# Patient Record
Sex: MALE | Race: WHITE | Employment: OTHER | URBAN - METROPOLITAN AREA
[De-identification: names, ages, dates, MRNs, and addresses within clinical notes are randomized per-mention and may not be internally consistent; named-entity substitution may affect disease eponyms.]

---

## 2017-01-09 ENCOUNTER — OFFICE VISIT (OUTPATIENT)
Dept: FAMILY MEDICINE CLINIC | Age: 62
End: 2017-01-09

## 2017-01-09 VITALS
DIASTOLIC BLOOD PRESSURE: 72 MMHG | HEIGHT: 75 IN | HEART RATE: 83 BPM | OXYGEN SATURATION: 99 % | SYSTOLIC BLOOD PRESSURE: 124 MMHG | RESPIRATION RATE: 12 BRPM | BODY MASS INDEX: 28.1 KG/M2 | WEIGHT: 226 LBS | TEMPERATURE: 97.2 F

## 2017-01-09 DIAGNOSIS — Z72.0 TOBACCO ABUSE: ICD-10-CM

## 2017-01-09 DIAGNOSIS — I10 HTN, GOAL BELOW 140/90: ICD-10-CM

## 2017-01-09 LAB — HBA1C MFR BLD HPLC: 12.3 %

## 2017-01-09 RX ORDER — LISINOPRIL AND HYDROCHLOROTHIAZIDE 12.5; 2 MG/1; MG/1
TABLET ORAL
Qty: 90 TAB | Refills: 0 | Status: SHIPPED | OUTPATIENT
Start: 2017-01-09 | End: 2017-04-21 | Stop reason: SDUPTHER

## 2017-01-09 RX ORDER — METFORMIN HYDROCHLORIDE 1000 MG/1
TABLET ORAL
Qty: 180 TAB | Refills: 0 | Status: SHIPPED | OUTPATIENT
Start: 2017-01-09 | End: 2017-04-21 | Stop reason: SDUPTHER

## 2017-01-09 RX ORDER — GLIPIZIDE 10 MG/1
10 TABLET ORAL 2 TIMES DAILY
Qty: 180 TAB | Refills: 0 | Status: SHIPPED | OUTPATIENT
Start: 2017-01-09 | End: 2017-04-21 | Stop reason: SDUPTHER

## 2017-01-09 RX ORDER — OXYCODONE AND ACETAMINOPHEN 5; 325 MG/1; MG/1
2 TABLET ORAL
COMMUNITY
Start: 2016-12-09

## 2017-01-09 RX ORDER — INSULIN GLARGINE 100 [IU]/ML
INJECTION, SOLUTION SUBCUTANEOUS
Qty: 1 EACH | Refills: 3 | Status: SHIPPED | OUTPATIENT
Start: 2017-01-09 | End: 2017-01-30 | Stop reason: SDUPTHER

## 2017-01-09 NOTE — MR AVS SNAPSHOT
Visit Information Date & Time Provider Department Dept. Phone Encounter #  
 1/9/2017  2:40 PM [facility] [phone] [id number] Follow-up Instructions Return in about 2 weeks (around 1/23/2017) for Diabetes follow up. Upcoming Health Maintenance Date Due  
 EYE EXAM RETINAL OR DILATED Q1 4/25/2017* HEMOGLOBIN A1C Q6M 3/15/2017 FOOT EXAM Q1 9/15/2017 MICROALBUMIN Q1 9/15/2017 LIPID PANEL Q1 9/15/2017 DTaP/Tdap/Td series (2 - Td) 9/15/2022 COLONOSCOPY 4/25/2026 *Topic was postponed. The date shown is not the original due date. Allergies as of 1/9/2017  Review Complete On: 1/9/2017 By: Brian Guidry LPN No Known Allergies Current Immunizations  Never Reviewed No immunizations on file. Not reviewed this visit You Were Diagnosed With   
  
 Codes Comments Uncontrolled type 2 diabetes mellitus with microalbuminuria, with long-term current use of insulin (HCC)    -  Primary ICD-10-CM: E11.29, E11.65, R80.9, Z79.4 ICD-9-CM: 250.42, 791.0, V58.67 Type II diabetes mellitus with nephropathy (HCC)     ICD-10-CM: E11.21 
ICD-9-CM: 250.40, 583.81   
 HTN, goal below 140/90     ICD-10-CM: I10 
ICD-9-CM: 401.9 Vitals BP Pulse Temp Resp Height(growth percentile) Weight(growth percentile) 124/72 83 97.2 °F (36.2 °C) (Oral) 12 6' 3\" (1.905 m) 226 lb (102.5 kg) SpO2 BMI Smoking Status 99% 28.25 kg/m2 Current Every Day Smoker Vitals History BMI and BSA Data Body Mass Index Body Surface Area  
 28.25 kg/m 2 2.33 m 2 Preferred Pharmacy Pharmacy Name Phone [phone] [place] [phone] Your Updated Medication List  
  
   
This list is accurate as of: 1/9/17  3:43 PM.  Always use your most recent med list.  
  
  
  
  
 atorvastatin 80 mg tablet Commonly known as:  LIPITOR Take 1 Tab by mouth daily. Blood-Glucose Meter Misc Commonly known as:  ADVANCED GLUCOSE METER  
1 Units by Does Not Apply route daily. Check fasting blood sugars once daily and record results. budesonide-formoterol 160-4.5 mcg/actuation HFA inhaler Commonly known as:  SYMBICORT Take 2 Puffs by inhalation two (2) times a day. cyclobenzaprine 10 mg tablet Commonly known as:  FLEXERIL Take 1 Tab by mouth three (3) times daily as needed for Muscle Spasm(s). gabapentin 600 mg tablet Commonly known as:  NEURONTIN Take  by mouth two (2) times a day. glipiZIDE 10 mg tablet Commonly known as:  [place] Take 1 Tab by mouth two (2) times a day. glucose blood VI test strips strip Commonly known as:  ADVANCED GLUC METER TEST STRIP Check blood glucose fasting once daily  
  
 ibuprofen 600 mg tablet Commonly known as:  MOTRIN  
TAKE ONE TABLET BY MOUTH EVERY 8 HOURS AS NEEDED FOR PAIN  
  
 insulin glargine 100 unit/mL (3 mL) pen Commonly known as:  LANTUS SOLOSTAR Inject 22 units once per day at bedtime  Indications: type 2 diabetes mellitus Insulin Needles (Disposable) 31 gauge x 1/4\" Ndle Commonly known as:  COMFORT EZ PEN NEEDLES  
1 Units by Does Not Apply route daily. Lancets Misc Check blood glucose daily  
  
 lisinopril-hydroCHLOROthiazide 20-12.5 mg per tablet Commonly known as:  PRINZIDE, ZESTORETIC  
TAKE ONE TABLET BY MOUTH ONCE DAILY  
  
 metFORMIN 1,000 mg tablet Commonly known as:  GLUCOPHAGE  
TAKE ONE TABLET BY MOUTH TWICE DAILY WITH MEALS  
  
 oxyCODONE-acetaminophen 5-325 mg per tablet Commonly known as:  PERCOCET SITagliptin 50 mg tablet Commonly known as:  Gisella Rhein Take 1 Tab by mouth daily. tiotropium bromide 1.25 mcg/actuation inhaler Commonly known as:  Juanis Butt Take 2 Puffs by inhalation daily. Prescriptions Sent to Pharmacy Refills glipiZIDE (GLUCOTROL) 10 mg tablet 0 Sig: Take 1 Tab by mouth two (2) times a day. Class: Normal  
 Pharmacy: [address] Ph #: [phone] Route: Oral  
 lisinopril-hydroCHLOROthiazide (PRINZIDE, ZESTORETIC) 20-12.5 mg per tablet 0 Sig: TAKE ONE TABLET BY MOUTH ONCE DAILY Class: Normal  
 Pharmacy: [address] [place] [address] Ph #: [phone]  
 metFORMIN (GLUCOPHAGE) 1,000 mg tablet 0 Sig: TAKE ONE TABLET BY MOUTH TWICE DAILY WITH MEALS Class: Normal  
 Pharmacy: [address] [place] [address] Ph #: [phone]  
 insulin glargine (LANTUS SOLOSTAR) 100 unit/mL (3 mL) pen 3 Sig: Inject 22 units once per day at bedtime  Indications: type 2 diabetes mellitus Class: Normal  
 Pharmacy: [address] Ph #: [phone] We Performed the Following AMB POC HEMOGLOBIN A1C [69198 CPT(R)] Follow-up Instructions Return in about 2 weeks (around 1/23/2017) for Diabetes follow up. Patient Instructions   
 
  
- Start taking Lantus 22 units at bedtime.  
- Increase to 25 units if fasting blood sugars > 150 in 1 week. - Follow up in 2-3 weeks Statins: Care Instructions Your Care Instructions Statins are medicines that lower your cholesterol and your risk for a heart attack and stroke. Cholesterol is a type of fat in your blood. If you have too much cholesterol, it can build up in blood vessels. This raises your risk of heart disease, heart attack, and stroke. Statins lower cholesterol by blocking how much cholesterol your body makes. This prevents cholesterol from building up in your blood vessels. This is called hardening of the arteries. It is the starting point for some heart and blood flow problems, such as heart disease.  Statins may also reduce inflammation around the buildup (called plaque). This can lower the risk that the plaque will break apart and lead to a heart attack or stroke. A heart-healthy lifestyle is important for lowering your risk whether you take statins or not. This includes eating healthy foods, being active, staying at a healthy weight, and not smoking. You must take statins regularly for them to work well. If you stop, your cholesterol and your risk will go back up. Examples of statins include: · Atorvastatin (Lipitor). · Lovastatin (Mevacor). · Pravastatin (Pravachol). · Simvastatin (Zocor). Statins interact with many medicines. So tell your doctor all of the other medicines that you take. These include prescription medicines, over-the-counter medicines, dietary supplements, and herbal products. Follow-up care is a key part of your treatment and safety. Be sure to make and go to all appointments, and call your doctor if you are having problems. It's also a good idea to know your test results and keep a list of the medicines you take. How can you care for yourself at home? · Take statins exactly as your doctor tells you. High cholesterol has no symptoms. So it is easy to forget to take the pills. Try to make a system that reminds you to take them. · Do not take two or more medicines at the same time unless the doctor told you to. Statins can interact with other medicines. · Always tell your doctor if you think you are having a side effect. If side effects are a problem with one medicine, a different one may be used. · Keep making the lifestyle changes your doctor suggests. Eat heart-healthy foods, be active, don't smoke, and stay at a healthy weight. · Talk to your doctor about avoiding grapefruit juice if you take statins. Grapefruit juice can raise the level of this medicine in your blood. This could increase side effects. When should you call for help? Watch closely for changes in your health, and be sure to contact your doctor if: 
· You have side effects of statins. These include: ¨ Fatigue. ¨ Upset stomach. ¨ Gas. ¨ Constipation. ¨ Pain or cramps in the belly. ¨ Muscle aches. · You have any new symptoms or side effects. Where can you learn more? Go to http://jaz-bhavna.info/. Enter R358 in the search box to learn more about \"Statins: Care Instructions. \" Current as of: June 30, 2016 Content Version: 11.1 © 8163-8496 [employer]. Care instructions adapted under license by [employer] (which disclaims liability or warranty for this information). If you have questions about a medical condition or this instruction, always ask your healthcare professional. [address] any warranty or liability for your use of this information. Insulin Glargine (By injection) Insulin Glargine, Recombinant (IN-taylor-lisa GLAKAIA-carolee, ree-KOM-bi-nant) Treats diabetes. Brand Name(s):Lantus, Lantus SoloStar, Toujeo There may be other brand names for this medicine. When This Medicine Should Not Be Used: This medicine is not right for everyone. Do not use it if you had an allergic reaction to insulin glargine or other components of this product. How to Use This Medicine:  
Injectable · Your doctor will prescribe your exact dose and tell you how often it should be given. This medicine is given as a shot under your skin. If you use insulin once a day, it is best to use it at about the same time every day. · You will be taught how to give your medicine at home. Make sure you understand all instructions. · This medicine should look clear before you use it. Do not shake the vial. Do not mix this medicine with any other insulin or with water. · You will be shown the body areas where this shot can be given. Use a different body area each time you give yourself a shot.  Keep track of where you give each shot to make sure you rotate body areas. · Use a new needle and syringe each time you inject your medicine. · Read and follow the patient instructions that come with this medicine. Talk to your doctor or pharmacist if you have any questions. · Use only syringes that are made for insulin. Some types of insulin must be used with a certain type of syringe or needle. Ask your pharmacist if you are not sure which one to use. · When you get a new supply of insulin, check the label to be sure it is the correct type. · Missed dose: Call your doctor or pharmacist for instructions. · Unopened medicine: Store the vials, cartridges, and SoloStar® pens in the refrigerator. Protect from light. Do not freeze. · Opened medicine that is currently being used: ¨ Vials: Store in the refrigerator or at room temperature in a cool place, away from sunlight and heat. Use within 28 days. ¨ Cartridge or Pulte Homes: Store at room temperature, away from direct heat and light. Do not refrigerate. Throw away any opened cartridge or Lantus® SoloStar® pen after 28 days. Throw away any opened [employer] after 42 days. · Throw away used needles in a hard, closed container that the needles cannot poke through. Keep this container away from children and pets. Drugs and Foods to Avoid: Ask your doctor or pharmacist before using any other medicine, including over-the-counter medicines, vitamins, and herbal products. · Some medicines can change the amount of insulin you need and make it harder for you to control your diabetes. Make sure your doctor knows about all other medicines you are using, especially pramlintide, somatropin, blood pressure medicine, birth control pills, thyroid medicine, or a protease inhibitor (HIV medicine). · Do not drink alcohol while you are using this medicine. Warnings While Using This Medicine: · Tell your doctor if you are pregnant or breastfeeding, or if you have kidney disease, liver disease, heart disease, or heart failure. · This medicine may cause the following problems: 
¨ Low blood sugar levels ¨ Low potassium levels ¨ Fluid retention or heart failure (when used with thiazolidinedione medicine) · Never share insulin pens or cartridges with anyone. Shared needles or pens can pass hepatitis viruses, HIV, or other illnesses from one person to another. · Your doctor will do lab tests at regular visits to check on the effects of this medicine. Keep all appointments. · Keep all medicine out of the reach of children. Never share your medicine with anyone. Possible Side Effects While Using This Medicine:  
Call your doctor right away if you notice any of these side effects: · Allergic reaction: Itching or hives, swelling in your face or hands, swelling or tingling in your mouth or throat, chest tightness, trouble breathing · Dry mouth, increased thirst, muscle cramps, nausea or vomiting, uneven heartbeat · Rapid weight gain, swelling in your hands, ankles, or feet · Shaking, trembling, sweating, lightheadedness, hunger, confusion, fast or pounding heartbeat · Trouble breathing, chest pain, unusual tiredness If you notice these less serious side effects, talk with your doctor: · Redness, pain, itching, burning, swelling, or a lump under your skin where the shot was given If you notice other side effects that you think are caused by this medicine, tell your doctor. Call your doctor for medical advice about side effects. You may report side effects to FDA at 2-488-FDA-5696 © 2016 [address] is for End User's use only and may not be sold, redistributed or otherwise used for commercial purposes. The above information is an  only. It is not intended as medical advice for individual conditions or treatments.  Talk to your doctor, nurse or pharmacist before following any medical regimen to see if it is safe and effective for you. Introducing [facility]! Romayne Duster introduces [employer] patient portal. Now you can access parts of your medical record, email your doctor's office, and request medication refills online. 1. In your internet browser, go to [email or web address] 2. Click on the First Time User? Click Here link in the Sign In box. You will see the New Member Sign Up page. 3. Enter your [employer] Access Code exactly as it appears below. You will not need to use this code after youve completed the sign-up process. If you do not sign up before the expiration date, you must request a new code. · [employer] Access Code: [email or web address] Expires: 4/9/2017  3:43 PM 
 
4. Enter the last four digits of your Social Security Number (xxxx) and Date of Birth (mm/dd/yyyy) as indicated and click Submit. You will be taken to the next sign-up page. 5. Create a [employer] ID. This will be your [employer] login ID and cannot be changed, so think of one that is secure and easy to remember. 6. Create a [employer] password. You can change your password at any time. 7. Enter your Password Reset Question and Answer. This can be used at a later time if you forget your password. 8. Enter your e-mail address. You will receive e-mail notification when new information is available in [address] 9. Click Sign Up. You can now view and download portions of your medical record. 10. Click the Download Summary menu link to download a portable copy of your medical information. If you have questions, please visit the Frequently Asked Questions section of the [employer] website. Remember, [employer] is NOT to be used for urgent needs. For medical emergencies, dial 911. Now available from your iPhone and Android! Please provide this summary of care documentation to your next provider. Your primary care clinician is listed as April Inoue.  If you have any questions after today's visit, please call [phone].

## 2017-01-09 NOTE — PATIENT INSTRUCTIONS
- Start taking Lantus 22 units at bedtime.   - Increase to 25 units if fasting blood sugars > 150 in 1 week. - Follow up in 2-3 weeks    Statins: Care Instructions  Your Care Instructions  Statins are medicines that lower your cholesterol and your risk for a heart attack and stroke. Cholesterol is a type of fat in your blood. If you have too much cholesterol, it can build up in blood vessels. This raises your risk of heart disease, heart attack, and stroke. Statins lower cholesterol by blocking how much cholesterol your body makes. This prevents cholesterol from building up in your blood vessels. This is called hardening of the arteries. It is the starting point for some heart and blood flow problems, such as heart disease. Statins may also reduce inflammation around the buildup (called plaque). This can lower the risk that the plaque will break apart and lead to a heart attack or stroke. A heart-healthy lifestyle is important for lowering your risk whether you take statins or not. This includes eating healthy foods, being active, staying at a healthy weight, and not smoking. You must take statins regularly for them to work well. If you stop, your cholesterol and your risk will go back up. Examples of statins include:  · Atorvastatin (Lipitor). · Lovastatin (Mevacor). · Pravastatin (Pravachol). · Simvastatin (Zocor). Statins interact with many medicines. So tell your doctor all of the other medicines that you take. These include prescription medicines, over-the-counter medicines, dietary supplements, and herbal products. Follow-up care is a key part of your treatment and safety. Be sure to make and go to all appointments, and call your doctor if you are having problems. It's also a good idea to know your test results and keep a list of the medicines you take. How can you care for yourself at home? · Take statins exactly as your doctor tells you. High cholesterol has no symptoms.  So it is easy to forget to take the pills. Try to make a system that reminds you to take them. · Do not take two or more medicines at the same time unless the doctor told you to. Statins can interact with other medicines. · Always tell your doctor if you think you are having a side effect. If side effects are a problem with one medicine, a different one may be used. · Keep making the lifestyle changes your doctor suggests. Eat heart-healthy foods, be active, don't smoke, and stay at a healthy weight. · Talk to your doctor about avoiding grapefruit juice if you take statins. Grapefruit juice can raise the level of this medicine in your blood. This could increase side effects. When should you call for help? Watch closely for changes in your health, and be sure to contact your doctor if:  · You have side effects of statins. These include:  ¨ Fatigue. ¨ Upset stomach. ¨ Gas. ¨ Constipation. ¨ Pain or cramps in the belly. ¨ Muscle aches. · You have any new symptoms or side effects. Where can you learn more? Go to http://jaz-bhavna.info/. Enter R358 in the search box to learn more about \"Statins: Care Instructions. \"  Current as of: June 30, 2016  Content Version: 11.1  © 9832-8380 [employer]. Care instructions adapted under license by [employer] (which disclaims liability or warranty for this information). If you have questions about a medical condition or this instruction, always ask your healthcare professional. [place] [address] any warranty or liability for your use of this information. Insulin Glargine (By injection)   Insulin Glargine, Recombinant (IN-taylor-lisa GLAR-jeen, ree-KOM-bi-nant)  Treats diabetes. Brand Name(s):Lantus, Lantus SoloStar, Toujeo   There may be other brand names for this medicine. When This Medicine Should Not Be Used: This medicine is not right for everyone.  Do not use it if you had an allergic reaction to insulin glargine or other components of this product. How to Use This Medicine:   Injectable  · Your doctor will prescribe your exact dose and tell you how often it should be given. This medicine is given as a shot under your skin. If you use insulin once a day, it is best to use it at about the same time every day. · You will be taught how to give your medicine at home. Make sure you understand all instructions. · This medicine should look clear before you use it. Do not shake the vial. Do not mix this medicine with any other insulin or with water. · You will be shown the body areas where this shot can be given. Use a different body area each time you give yourself a shot. Keep track of where you give each shot to make sure you rotate body areas. · Use a new needle and syringe each time you inject your medicine. · Read and follow the patient instructions that come with this medicine. Talk to your doctor or pharmacist if you have any questions. · Use only syringes that are made for insulin. Some types of insulin must be used with a certain type of syringe or needle. Ask your pharmacist if you are not sure which one to use. · When you get a new supply of insulin, check the label to be sure it is the correct type. · Missed dose: Call your doctor or pharmacist for instructions. · Unopened medicine: Store the vials, cartridges, and SoloStar® pens in the refrigerator. Protect from light. Do not freeze. · Opened medicine that is currently being used:   ¨ Vials: Store in the refrigerator or at room temperature in a cool place, away from sunlight and heat. Use within 28 days. ¨ Cartridge or Pulte Homes: Store at room temperature, away from direct heat and light. Do not refrigerate. Throw away any opened cartridge or Lantus® SoloStar® pen after 28 days. Throw away any opened [employer] after 42 days. · Throw away used needles in a hard, closed container that the needles cannot poke through.  Keep this container away from children and pets. Drugs and Foods to Avoid:   Ask your doctor or pharmacist before using any other medicine, including over-the-counter medicines, vitamins, and herbal products. · Some medicines can change the amount of insulin you need and make it harder for you to control your diabetes. Make sure your doctor knows about all other medicines you are using, especially pramlintide, somatropin, blood pressure medicine, birth control pills, thyroid medicine, or a protease inhibitor (HIV medicine). · Do not drink alcohol while you are using this medicine. Warnings While Using This Medicine:   · Tell your doctor if you are pregnant or breastfeeding, or if you have kidney disease, liver disease, heart disease, or heart failure. · This medicine may cause the following problems:  ¨ Low blood sugar levels  ¨ Low potassium levels  ¨ Fluid retention or heart failure (when used with thiazolidinedione medicine)  · Never share insulin pens or cartridges with anyone. Shared needles or pens can pass hepatitis viruses, HIV, or other illnesses from one person to another. · Your doctor will do lab tests at regular visits to check on the effects of this medicine. Keep all appointments. · Keep all medicine out of the reach of children. Never share your medicine with anyone.   Possible Side Effects While Using This Medicine:   Call your doctor right away if you notice any of these side effects:  · Allergic reaction: Itching or hives, swelling in your face or hands, swelling or tingling in your mouth or throat, chest tightness, trouble breathing  · Dry mouth, increased thirst, muscle cramps, nausea or vomiting, uneven heartbeat  · Rapid weight gain, swelling in your hands, ankles, or feet  · Shaking, trembling, sweating, lightheadedness, hunger, confusion, fast or pounding heartbeat  · Trouble breathing, chest pain, unusual tiredness  If you notice these less serious side effects, talk with your doctor:   · Redness, pain, itching, burning, swelling, or a lump under your skin where the shot was given  If you notice other side effects that you think are caused by this medicine, tell your doctor. Call your doctor for medical advice about side effects. You may report side effects to FDA at 7-006-LMO-2152  © 2016 [address] is for End User's use only and may not be sold, redistributed or otherwise used for commercial purposes. The above information is an  only. It is not intended as medical advice for individual conditions or treatments. Talk to your doctor, nurse or pharmacist before following any medical regimen to see if it is safe and effective for you.
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
## 2017-01-13 ENCOUNTER — TELEPHONE (OUTPATIENT)
Dept: FAMILY MEDICINE CLINIC | Age: 62
End: 2017-01-13

## 2017-01-13 NOTE — TELEPHONE ENCOUNTER
Patient called wanting to speak with Dr. Leopold Marble regarding medication that he was prescribed by the dentist other

## 2017-01-19 NOTE — TELEPHONE ENCOUNTER
Returned call. Patient ID [id number]. His dentist put him on Amoxicillin for a tooth infection and he was concerned about the high dose. So far tolerating the medication well. Reassured him that the Amoxicillin was safe to take with his other medications. He plans to follow up with his Dentist soon. Also, BGs still running high in the 250s. Instructed him to increase his Lantus from 22units daily to 26 units daily. Follow up as planned on 1/30.     Lucila Hancock MD

## 2017-01-30 ENCOUNTER — OFFICE VISIT (OUTPATIENT)
Dept: FAMILY MEDICINE CLINIC | Age: 62
End: 2017-01-30

## 2017-01-30 VITALS
RESPIRATION RATE: 16 BRPM | OXYGEN SATURATION: 98 % | SYSTOLIC BLOOD PRESSURE: 138 MMHG | DIASTOLIC BLOOD PRESSURE: 79 MMHG | BODY MASS INDEX: 27.73 KG/M2 | WEIGHT: 223 LBS | TEMPERATURE: 97.4 F | HEIGHT: 75 IN | HEART RATE: 76 BPM

## 2017-01-30 DIAGNOSIS — Z72.0 TOBACCO ABUSE: ICD-10-CM

## 2017-01-30 RX ORDER — INSULIN GLARGINE 100 [IU]/ML
INJECTION, SOLUTION SUBCUTANEOUS
Qty: 1 EACH | Refills: 3 | Status: SHIPPED | OUTPATIENT
Start: 2017-01-30 | End: 2017-04-27 | Stop reason: SDUPTHER

## 2017-01-30 NOTE — MR AVS SNAPSHOT
Visit Information Date & Time Provider Department Dept. Phone Encounter #  
 1/30/2017  1:50 PM [facility] [phone] [id number] Follow-up Instructions Return in about 4 weeks (around 2/27/2017) for Diabetes follow up. Upcoming Health Maintenance Date Due  
 EYE EXAM RETINAL OR DILATED Q1 1/30/2018* HEMOGLOBIN A1C Q6M 7/9/2017 FOOT EXAM Q1 9/15/2017 MICROALBUMIN Q1 9/15/2017 LIPID PANEL Q1 9/15/2017 DTaP/Tdap/Td series (2 - Td) 9/15/2022 COLONOSCOPY 4/25/2026 *Topic was postponed. The date shown is not the original due date. Allergies as of 1/30/2017  Review Complete On: 1/30/2017 By: Matt Montoya, PHILIP, RT, NM, ARRT No Known Allergies Current Immunizations  Never Reviewed No immunizations on file. Not reviewed this visit You Were Diagnosed With   
  
 Codes Comments Uncontrolled type 2 diabetes mellitus with microalbuminuria, with long-term current use of insulin ([facility])    -  Primary ICD-10-CM: E11.29, E11.65, R80.9, Z79.4 ICD-9-CM: 250.42, 791.0, V58.67 Tobacco abuse     ICD-10-CM: Z72.0 ICD-9-CM: 305.1 Vitals BP Pulse Temp Resp Height(growth percentile) Weight(growth percentile) 138/79 (BP 1 Location: Right arm, BP Patient Position: Sitting) 76 97.4 °F (36.3 °C) (Oral) 16 6' 3\" (1.905 m) 223 lb (101.2 kg) SpO2 BMI Smoking Status 98% 27.87 kg/m2 Current Every Day Smoker BMI and BSA Data Body Mass Index Body Surface Area  
 27.87 kg/m 2 2.31 m 2 Preferred Pharmacy Pharmacy Name Phone [facility] [address] [phone] Your Updated Medication List  
  
   
This list is accurate as of: 1/30/17  2:34 PM.  Always use your most recent med list.  
  
  
  
  
 atorvastatin 80 mg tablet Commonly known as:  LIPITOR Take 1 Tab by mouth daily. Blood-Glucose Meter Misc Commonly known as:  ADVANCED GLUCOSE METER  
1 Units by Does Not Apply route daily. Check fasting blood sugars once daily and record results. budesonide-formoterol 160-4.5 mcg/actuation HFA inhaler Commonly known as:  SYMBICORT Take 2 Puffs by inhalation two (2) times a day. cyclobenzaprine 10 mg tablet Commonly known as:  FLEXERIL Take 1 Tab by mouth three (3) times daily as needed for Muscle Spasm(s). gabapentin 600 mg tablet Commonly known as:  NEURONTIN Take  by mouth two (2) times a day. glipiZIDE 10 mg tablet Commonly known as:  Selestine Levin Take 1 Tab by mouth two (2) times a day. glucose blood VI test strips strip Commonly known as:  ADVANCED GLUC METER TEST STRIP Check blood glucose fasting once daily  
  
 ibuprofen 600 mg tablet Commonly known as:  MOTRIN  
TAKE ONE TABLET BY MOUTH EVERY 8 HOURS AS NEEDED FOR PAIN  
  
 insulin glargine 100 unit/mL (3 mL) pen Commonly known as:  LANTUS SOLOSTAR Inject 30 units once per day at bedtime  Indications: type 2 diabetes mellitus Insulin Needles (Disposable) 31 gauge x 1/4\" Ndle Commonly known as:  COMFORT EZ PEN NEEDLES  
1 Units by Does Not Apply route daily. Lancets Misc Check blood glucose daily  
  
 lisinopril-hydroCHLOROthiazide 20-12.5 mg per tablet Commonly known as:  PRINZIDE, ZESTORETIC  
TAKE ONE TABLET BY MOUTH ONCE DAILY  
  
 metFORMIN 1,000 mg tablet Commonly known as:  GLUCOPHAGE  
TAKE ONE TABLET BY MOUTH TWICE DAILY WITH MEALS  
  
 oxyCODONE-acetaminophen 5-325 mg per tablet Commonly known as:  PERCOCET SITagliptin 50 mg tablet Commonly known as:  Merline Pesa Take 1 Tab by mouth daily. tiotropium bromide 1.25 mcg/actuation inhaler Commonly known as:  Puente August Take 2 Puffs by inhalation daily. Prescriptions Sent to Pharmacy  Refills  
 insulin glargine (LANTUS SOLOSTAR) 100 unit/mL (3 mL) pen 3  
 Sig: Inject 30 units once per day at bedtime  Indications: type 2 diabetes mellitus Class: Normal  
 Pharmacy: [facility] [address]  #: [phone] We Performed the Following  DIABETES EYE EXAM [HM6 Custom] ND SMOKING AND TOBACCO USE CESSATION 3 - 10 MINUTES [45106 CPT(R)] REFERRAL TO OPHTHALMOLOGY [REF57 Custom] Follow-up Instructions Return in about 4 weeks (around 2/27/2017) for Diabetes follow up. Referral Information Referral ID Referred By Referred To  
  
 [id number] [facility] [address] [facility] [address] Visits Status Start Date End Date 1 New Request 1/30/17 1/30/18 If your referral has a status of pending review or denied, additional information will be sent to support the outcome of this decision. Patient Instructions [facility] Ophthalmology Dr. Kizzy Barrera Address: [address] Phone: [phone] Stopping Smoking: Care Instructions Your Care Instructions Cigarette smokers crave the nicotine in cigarettes. Giving it up is much harder than simply changing a habit. Your body has to stop craving the nicotine. It is hard to quit, but you can do it. There are many tools that people use to quit smoking. You may find that combining tools works best for you. There are several steps to quitting. First you get ready to quit. Then you get support to help you. After that, you learn new skills and behaviors to become a nonsmoker. For many people, a necessary step is getting and using medicine. Your doctor will help you set up the plan that best meets your needs. You may want to attend a smoking cessation program to help you quit smoking. When you choose a program, look for one that has proven success. Ask your doctor for ideas.  You will greatly increase your chances of success if you take medicine as well as get counseling or join a cessation program. 
Some of the changes you feel when you first quit tobacco are uncomfortable. Your body will miss the nicotine at first, and you may feel short-tempered and grumpy. You may have trouble sleeping or concentrating. Medicine can help you deal with these symptoms. You may struggle with changing your smoking habits and rituals. The last step is the tricky one: Be prepared for the smoking urge to continue for a time. This is a lot to deal with, but keep at it. You will feel better. Follow-up care is a key part of your treatment and safety. Be sure to make and go to all appointments, and call your doctor if you are having problems. Its also a good idea to know your test results and keep a list of the medicines you take. How can you care for yourself at home? · Ask your family, friends, and coworkers for support. You have a better chance of quitting if you have help and support. · Join a support group, such as Nicotine Anonymous, for people who are trying to quit smoking. · Consider signing up for a smoking cessation program, such as the American Lung Association's Freedom from Smoking program. 
· Set a quit date. Pick your date carefully so that it is not right in the middle of a big deadline or stressful time. Once you quit, do not even take a puff. Get rid of all ashtrays and lighters after your last cigarette. Clean your house and your clothes so that they do not smell of smoke. · Learn how to be a nonsmoker. Think about ways you can avoid those things that make you reach for a cigarette. ¨ Avoid situations that put you at greatest risk for smoking. For some people, it is hard to have a drink with friends without smoking. For others, they might skip a coffee break with coworkers who smoke. ¨ Change your daily routine. Take a different route to work or eat a meal in a different place. · Cut down on stress. Calm yourself or release tension by doing an activity you enjoy, such as reading a book, taking a hot bath, or gardening. · Talk to your doctor or pharmacist about nicotine replacement therapy, which replaces the nicotine in your body. You still get nicotine but you do not use tobacco. Nicotine replacement products help you slowly reduce the amount of nicotine you need. These products come in several forms, many of them available over-the-counter: ¨ Nicotine patches ¨ Nicotine gum and lozenges ¨ Nicotine inhaler · Ask your doctor about bupropion (Wellbutrin) or varenicline (Chantix), which are prescription medicines. They do not contain nicotine. They help you by reducing withdrawal symptoms, such as stress and anxiety. · Some people find hypnosis, acupuncture, and massage helpful for ending the smoking habit. · Eat a healthy diet and get regular exercise. Having healthy habits will help your body move past its craving for nicotine. · Be prepared to keep trying. Most people are not successful the first few times they try to quit. Do not get mad at yourself if you smoke again. Make a list of things you learned and think about when you want to try again, such as next week, next month, or next year. Where can you learn more? Go to http://jaz-bhavna.info/. Enter I975 in the search box to learn more about \"Stopping Smoking: Care Instructions. \" Current as of: May 26, 2016 Content Version: 11.1 © 1209-3363 Healthwise, Incorporated. Care instructions adapted under license by [employer] (which disclaims liability or warranty for this information). If you have questions about a medical condition or this instruction, always ask your healthcare professional. [address] any warranty or liability for your use of this information. Introducing [facility]!    
 Edson Mccray introduces Vast patient portal. Now you can access parts of your medical record, email your doctor's office, and request medication refills online. 1. In your internet browser, go to [email or web address] 2. Click on the First Time User? Click Here link in the Sign In box. You will see the New Member Sign Up page. 3. Enter your RunTitle Access Code exactly as it appears below. You will not need to use this code after youve completed the sign-up process. If you do not sign up before the expiration date, you must request a new code. · RunTitle Access Code: [email or web address] Expires: 4/9/2017  3:43 PM 
 
4. Enter the last four digits of your Social Security Number (xxxx) and Date of Birth (mm/dd/yyyy) as indicated and click Submit. You will be taken to the next sign-up page. 5. Create a RunTitle ID. This will be your RunTitle login ID and cannot be changed, so think of one that is secure and easy to remember. 6. Create a RunTitle password. You can change your password at any time. 7. Enter your Password Reset Question and Answer. This can be used at a later time if you forget your password. 8. Enter your e-mail address. You will receive e-mail notification when new information is available in [address] 9. Click Sign Up. You can now view and download portions of your medical record. 10. Click the Download Summary menu link to download a portable copy of your medical information. If you have questions, please visit the Frequently Asked Questions section of the RunTitle website. Remember, RunTitle is NOT to be used for urgent needs. For medical emergencies, dial 911. Now available from your iPhone and Android! Please provide this summary of care documentation to your next provider. Your primary care clinician is listed as April Inoue. If you have any questions after today's visit, please call [phone].
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
## 2017-01-30 NOTE — PATIENT INSTRUCTIONS
[facility]    Dr. Eugene Montiel  Address: [facility] [address]  Phone: [phone]    Stopping Smoking: Care Instructions  Your Care Instructions  Cigarette smokers crave the nicotine in cigarettes. Giving it up is much harder than simply changing a habit. Your body has to stop craving the nicotine. It is hard to quit, but you can do it. There are many tools that people use to quit smoking. You may find that combining tools works best for you. There are several steps to quitting. First you get ready to quit. Then you get support to help you. After that, you learn new skills and behaviors to become a nonsmoker. For many people, a necessary step is getting and using medicine. Your doctor will help you set up the plan that best meets your needs. You may want to attend a smoking cessation program to help you quit smoking. When you choose a program, look for one that has proven success. Ask your doctor for ideas. You will greatly increase your chances of success if you take medicine as well as get counseling or join a cessation program.  Some of the changes you feel when you first quit tobacco are uncomfortable. Your body will miss the nicotine at first, and you may feel short-tempered and grumpy. You may have trouble sleeping or concentrating. Medicine can help you deal with these symptoms. You may struggle with changing your smoking habits and rituals. The last step is the tricky one: Be prepared for the smoking urge to continue for a time. This is a lot to deal with, but keep at it. You will feel better. Follow-up care is a key part of your treatment and safety. Be sure to make and go to all appointments, and call your doctor if you are having problems. Its also a good idea to know your test results and keep a list of the medicines you take. How can you care for yourself at home? · Ask your family, friends, and coworkers for support.  You have a better chance of quitting if you have help and support. · Join a support group, such as Nicotine Anonymous, for people who are trying to quit smoking. · Consider signing up for a smoking cessation program, such as the American Lung Association's Freedom from Smoking program.  · Set a quit date. Pick your date carefully so that it is not right in the middle of a big deadline or stressful time. Once you quit, do not even take a puff. Get rid of all ashtrays and lighters after your last cigarette. Clean your house and your clothes so that they do not smell of smoke. · Learn how to be a nonsmoker. Think about ways you can avoid those things that make you reach for a cigarette. ¨ Avoid situations that put you at greatest risk for smoking. For some people, it is hard to have a drink with friends without smoking. For others, they might skip a coffee break with coworkers who smoke. ¨ Change your daily routine. Take a different route to work or eat a meal in a different place. · Cut down on stress. Calm yourself or release tension by doing an activity you enjoy, such as reading a book, taking a hot bath, or gardening. · Talk to your doctor or pharmacist about nicotine replacement therapy, which replaces the nicotine in your body. You still get nicotine but you do not use tobacco. Nicotine replacement products help you slowly reduce the amount of nicotine you need. These products come in several forms, many of them available over-the-counter:  ¨ Nicotine patches  ¨ Nicotine gum and lozenges  ¨ Nicotine inhaler  · Ask your doctor about bupropion (Wellbutrin) or varenicline (Chantix), which are prescription medicines. They do not contain nicotine. They help you by reducing withdrawal symptoms, such as stress and anxiety. · Some people find hypnosis, acupuncture, and massage helpful for ending the smoking habit. · Eat a healthy diet and get regular exercise. Having healthy habits will help your body move past its craving for nicotine.   · Be prepared to keep trying. Most people are not successful the first few times they try to quit. Do not get mad at yourself if you smoke again. Make a list of things you learned and think about when you want to try again, such as next week, next month, or next year. Where can you learn more? Go to http://jaz-bhavna.info/. Enter K082 in the search box to learn more about \"Stopping Smoking: Care Instructions. \"  Current as of: May 26, 2016  Content Version: 11.1  © 5198-2207 [employer]. Care instructions adapted under license by [employer] (which disclaims liability or warranty for this information). If you have questions about a medical condition or this instruction, always ask your healthcare professional. [address] any warranty or liability for your use of this information.

## 2017-02-09 NOTE — TELEPHONE ENCOUNTER
Patient needs a refill of the following  Requested Prescriptions     Pending Prescriptions Disp Refills    ibuprofen (MOTRIN) 600 mg tablet 60 Tab 0

## 2017-02-10 RX ORDER — IBUPROFEN 600 MG/1
TABLET ORAL
Qty: 60 TAB | Refills: 0 | OUTPATIENT
Start: 2017-02-10

## 2017-02-27 ENCOUNTER — OFFICE VISIT (OUTPATIENT)
Dept: FAMILY MEDICINE CLINIC | Age: 62
End: 2017-02-27

## 2017-02-27 VITALS
WEIGHT: 227 LBS | TEMPERATURE: 97.8 F | DIASTOLIC BLOOD PRESSURE: 95 MMHG | HEART RATE: 74 BPM | HEIGHT: 75 IN | BODY MASS INDEX: 28.23 KG/M2 | SYSTOLIC BLOOD PRESSURE: 139 MMHG | RESPIRATION RATE: 22 BRPM | OXYGEN SATURATION: 95 %

## 2017-02-27 DIAGNOSIS — Z72.0 TOBACCO ABUSE: ICD-10-CM

## 2017-02-27 DIAGNOSIS — I10 ESSENTIAL HYPERTENSION: ICD-10-CM

## 2017-02-27 NOTE — MR AVS SNAPSHOT
Visit Information Date & Time Provider Department Dept. Phone Encounter #  
 2/27/2017 10:10 AM Radames Telles [phone] [id number] Follow-up Instructions Return in about 2 months (around 4/27/2017) for Diabetes check. Upcoming Health Maintenance Date Due  
 EYE EXAM RETINAL OR DILATED Q1 1/30/2018* HEMOGLOBIN A1C Q6M 7/9/2017 FOOT EXAM Q1 9/15/2017 MICROALBUMIN Q1 9/15/2017 LIPID PANEL Q1 9/15/2017 DTaP/Tdap/Td series (2 - Td) 9/15/2022 COLONOSCOPY 4/25/2026 *Topic was postponed. The date shown is not the original due date. Allergies as of 2/27/2017  Review Complete On: 2/27/2017 By: Sonja Hodgkin No Known Allergies Current Immunizations  Reviewed on 2/27/2017 No immunizations on file. Reviewed by Sonja Hodgkin on 2/27/2017 at 10:43 AM  
You Were Diagnosed With   
  
 Codes Comments Uncontrolled type 2 diabetes mellitus with microalbuminuria, with long-term current use of insulin (HCC)    -  Primary ICD-10-CM: E11.29, E11.65, R80.9, Z79.4 ICD-9-CM: 250.42, 791.0, V58.67 Vitals BP  
  
  
  
  
  
 (!) 139/95 Vitals History BMI and BSA Data Body Mass Index Body Surface Area  
 28.37 kg/m 2 2.33 m 2 Preferred Pharmacy Pharmacy Name Phone [facility] [place], [address] [place] [address] [phone] Your Updated Medication List  
  
   
This list is accurate as of: 2/27/17 11:12 AM.  Always use your most recent med list.  
  
  
  
  
 atorvastatin 80 mg tablet Commonly known as:  LIPITOR Take 1 Tab by mouth daily. Blood-Glucose Meter Misc Commonly known as:  ADVANCED GLUCOSE METER  
1 Units by Does Not Apply route daily. Check fasting blood sugars once daily and record results. budesonide-formoterol 160-4.5 mcg/actuation HFA inhaler Commonly known as:  SYMBICORT  
 Take 2 Puffs by inhalation two (2) times a day. cyclobenzaprine 10 mg tablet Commonly known as:  FLEXERIL Take 1 Tab by mouth three (3) times daily as needed for Muscle Spasm(s). gabapentin 600 mg tablet Commonly known as:  NEURONTIN Take  by mouth two (2) times a day. glipiZIDE 10 mg tablet Commonly known as:  Estrada Helling Take 1 Tab by mouth two (2) times a day. glucose blood VI test strips strip Commonly known as:  ADVANCED GLUC METER TEST STRIP Check blood glucose fasting once daily  
  
 ibuprofen 600 mg tablet Commonly known as:  MOTRIN  
TAKE ONE TABLET BY MOUTH EVERY 8 HOURS AS NEEDED FOR PAIN  
  
 insulin glargine 100 unit/mL (3 mL) pen Commonly known as:  LANTUS SOLOSTAR Inject 30 units once per day at bedtime  Indications: type 2 diabetes mellitus Insulin Needles (Disposable) 31 gauge x 1/4\" Ndle Commonly known as:  COMFORT EZ PEN NEEDLES  
1 Units by Does Not Apply route daily. Lancets Misc Check blood glucose daily  
  
 lisinopril-hydroCHLOROthiazide 20-12.5 mg per tablet Commonly known as:  PRINZIDE, ZESTORETIC  
TAKE ONE TABLET BY MOUTH ONCE DAILY  
  
 metFORMIN 1,000 mg tablet Commonly known as:  GLUCOPHAGE  
TAKE ONE TABLET BY MOUTH TWICE DAILY WITH MEALS  
  
 oxyCODONE-acetaminophen 5-325 mg per tablet Commonly known as:  PERCOCET SITagliptin 50 mg tablet Commonly known as:  Patrice Ramosner Take 1 Tab by mouth daily. tiotropium bromide 1.25 mcg/actuation inhaler Commonly known as:  Megan Mascorro Take 2 Puffs by inhalation daily. Follow-up Instructions Return in about 2 months (around 4/27/2017) for Diabetes check. Patient Instructions Learning About Diabetes Food Guidelines Your Care Instructions Meal planning is important to manage diabetes. It helps keep your blood sugar at a target level (which you set with your doctor).  You don't have to eat special foods. You can eat what your family eats, including sweets once in a while. But you do have to pay attention to how often you eat and how much you eat of certain foods. You may want to work with a dietitian or a certified diabetes educator (CDE) to help you plan meals and snacks. A dietitian or CDE can also help you lose weight if that is one of your goals. What should you know about eating carbs? Managing the amount of carbohydrate (carbs) you eat is an important part of healthy meals when you have diabetes. Carbohydrate is found in many foods. · Learn which foods have carbs. And learn the amounts of carbs in different foods. ¨ Bread, cereal, pasta, and rice have about 15 grams of carbs in a serving. A serving is 1 slice of bread (1 ounce), ½ cup of cooked cereal, or 1/3 cup of cooked pasta or rice. ¨ Fruits have 15 grams of carbs in a serving. A serving is 1 small fresh fruit, such as an apple or orange; ½ of a banana; ½ cup of cooked or canned fruit; ½ cup of fruit juice; 1 cup of melon or raspberries; or 2 tablespoons of dried fruit. ¨ Milk and no-sugar-added yogurt have 15 grams of carbs in a serving. A serving is 1 cup of milk or 2/3 cup of no-sugar-added yogurt. ¨ Starchy vegetables have 15 grams of carbs in a serving. A serving is ½ cup of mashed potatoes or sweet potato; 1 cup winter squash; ½ of a small baked potato; ½ cup of cooked beans; or ½ cup cooked corn or green peas. · Learn how much carbs to eat each day and at each meal. A dietitian or CDE can teach you how to keep track of the amount of carbs you eat. This is called carbohydrate counting. · If you are not sure how to count carbohydrate grams, use the Plate Method to plan meals. It is a good, quick way to make sure that you have a balanced meal. It also helps you spread carbs throughout the day. ¨ Divide your plate by types of foods.  Put non-starchy vegetables on half the plate, meat or other protein food on one-quarter of the plate, and a grain or starchy vegetable in the final quarter of the plate. To this you can add a small piece of fruit and 1 cup of milk or yogurt, depending on how many carbs you are supposed to eat at a meal. 
· Try to eat about the same amount of carbs at each meal. Do not \"save up\" your daily allowance of carbs to eat at one meal. 
· Proteins have very little or no carbs per serving. Examples of proteins are beef, chicken, turkey, fish, eggs, tofu, cheese, cottage cheese, and peanut butter. A serving size of meat is 3 ounces, which is about the size of a deck of cards. Examples of meat substitute serving sizes (equal to 1 ounce of meat) are 1/4 cup of cottage cheese, 1 egg, 1 tablespoon of peanut butter, and ½ cup of tofu. How can you eat out and still eat healthy? · Learn to estimate the serving sizes of foods that have carbohydrate. If you measure food at home, it will be easier to estimate the amount in a serving of restaurant food. · If the meal you order has too much carbohydrate (such as potatoes, corn, or baked beans), ask to have a low-carbohydrate food instead. Ask for a salad or green vegetables. · If you use insulin, check your blood sugar before and after eating out to help you plan how much to eat in the future. · If you eat more carbohydrate at a meal than you had planned, take a walk or do other exercise. This will help lower your blood sugar. What else should you know? · Limit saturated fat, such as the fat from meat and dairy products. This is a healthy choice because people who have diabetes are at higher risk of heart disease. So choose lean cuts of meat and nonfat or low-fat dairy products. Use olive or canola oil instead of butter or shortening when cooking. · Don't skip meals. Your blood sugar may drop too low if you skip meals and take insulin or certain medicines for diabetes. · Check with your doctor before you drink alcohol. Alcohol can cause your blood sugar to drop too low. Alcohol can also cause a bad reaction if you take certain diabetes medicines. Follow-up care is a key part of your treatment and safety. Be sure to make and go to all appointments, and call your doctor if you are having problems. It's also a good idea to know your test results and keep a list of the medicines you take. Where can you learn more? Go to http://jaz-bhavna.info/. Enter F535 in the search box to learn more about \"Learning About Diabetes Food Guidelines. \" Current as of: May 23, 2016 Content Version: 11.1 © 2362-4972 [employer]. Care instructions adapted under license by [employer] (which disclaims liability or warranty for this information). If you have questions about a medical condition or this instruction, always ask your healthcare professional. [address] any warranty or liability for your use of this information. Introducing [facility]! Negrita Braga introduces [employer] Plan patient portal. Now you can access parts of your medical record, email your doctor's office, and request medication refills online. 1. In your internet browser, go to [email or web address] 2. Click on the First Time User? Click Here link in the Sign In box. You will see the New Member Sign Up page. 3. Enter your [employer] Access Code exactly as it appears below. You will not need to use this code after youve completed the sign-up process. If you do not sign up before the expiration date, you must request a new code. · [employer] Access Code: [email or web address] Expires: 4/9/2017  3:43 PM 
 
4. Enter the last four digits of your Social Security Number (xxxx) and Date of Birth (mm/dd/yyyy) as indicated and click Submit. You will be taken to the next sign-up page. 5. Create a Mass[employer] ID. This will be your [employer] login ID and cannot be changed, so think of one that is secure and easy to remember. 6. Create a [employer] password. You can change your password at any time. 7. Enter your Password Reset Question and Answer. This can be used at a later time if you forget your password. 8. Enter your e-mail address. You will receive e-mail notification when new information is available in [address] 9. Click Sign Up. You can now view and download portions of your medical record. 10. Click the Download Summary menu link to download a portable copy of your medical information. If you have questions, please visit the Frequently Asked Questions section of the [employer] website. Remember, [employer] is NOT to be used for urgent needs. For medical emergencies, dial 911. Now available from your iPhone and Android! Please provide this summary of care documentation to your next provider. Your primary care clinician is listed as Muna Ashley. If you have any questions after today's visit, please call [phone].
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
## 2017-02-27 NOTE — PATIENT INSTRUCTIONS

## 2017-02-27 NOTE — PROGRESS NOTES
Chief Complaint   Patient presents with    Diabetes     uncontrolled     Reviewed record in preparation for visit and have obtained necessary documentation. 1. Have you been to the ER, urgent care clinic since your last visit? Hospitalized since your last visit? No    2. Have you seen or consulted any other health care providers outside of the [address] [place] [address] since your last visit? Include any pap smears or colon screening.  Yes Where: [facility]

## 2017-04-07 ENCOUNTER — TELEPHONE (OUTPATIENT)
Dept: FAMILY MEDICINE CLINIC | Age: 62
End: 2017-04-07

## 2017-04-07 DIAGNOSIS — E11.22 CONTROLLED TYPE 2 DIABETES MELLITUS WITH STAGE 3 CHRONIC KIDNEY DISEASE, WITH LONG-TERM CURRENT USE OF INSULIN (HCC): Primary | ICD-10-CM

## 2017-04-07 DIAGNOSIS — Z79.4 CONTROLLED TYPE 2 DIABETES MELLITUS WITH STAGE 3 CHRONIC KIDNEY DISEASE, WITH LONG-TERM CURRENT USE OF INSULIN (HCC): Primary | ICD-10-CM

## 2017-04-07 DIAGNOSIS — N18.30 CONTROLLED TYPE 2 DIABETES MELLITUS WITH STAGE 3 CHRONIC KIDNEY DISEASE, WITH LONG-TERM CURRENT USE OF INSULIN (HCC): Primary | ICD-10-CM

## 2017-04-07 RX ORDER — BLOOD-GLUCOSE METER
EACH MISCELLANEOUS
Qty: 1 EACH | Refills: 0 | Status: SHIPPED | OUTPATIENT
Start: 2017-04-07

## 2017-04-07 NOTE — TELEPHONE ENCOUNTER
----- Message from [facility] sent at 4/7/2017  1:32 PM EDT -----  Regarding: Dr. Constantine Nash  Pt is requesting for a prescription for a test meter. Pt contact # [phone].

## 2017-04-07 NOTE — TELEPHONE ENCOUNTER
Patient called    [employer] is the name of the meter that he needs. Also needs test strips in #50 as this is the only amount the insurance will approve as well as the meter. Has not been able to test his glucose for three days. He did not take insulin last night due to not knowing what the number would be. Said he is embarrassed but this meter and strips was set in the microwave just to be out of sight and it was accidentally left in there when microwave was used. It was ruined. Can this be addressed today if possible?

## 2017-04-10 ENCOUNTER — TELEPHONE (OUTPATIENT)
Dept: FAMILY MEDICINE CLINIC | Age: 62
End: 2017-04-10

## 2017-04-27 ENCOUNTER — OFFICE VISIT (OUTPATIENT)
Dept: FAMILY MEDICINE CLINIC | Age: 62
End: 2017-04-27

## 2017-04-27 VITALS
TEMPERATURE: 98.1 F | WEIGHT: 232 LBS | HEIGHT: 75 IN | HEART RATE: 85 BPM | OXYGEN SATURATION: 95 % | BODY MASS INDEX: 28.85 KG/M2 | RESPIRATION RATE: 17 BRPM | DIASTOLIC BLOOD PRESSURE: 89 MMHG | SYSTOLIC BLOOD PRESSURE: 134 MMHG

## 2017-04-27 DIAGNOSIS — E66.3 OVERWEIGHT: ICD-10-CM

## 2017-04-27 DIAGNOSIS — E11.00 UNCONTROLLED TYPE 2 DIABETES MELLITUS WITH HYPEROSMOLARITY WITHOUT COMA, UNSPECIFIED LONG TERM INSULIN USE STATUS: Primary | ICD-10-CM

## 2017-04-27 DIAGNOSIS — I10 ESSENTIAL HYPERTENSION: ICD-10-CM

## 2017-04-27 DIAGNOSIS — Z72.0 TOBACCO ABUSE: ICD-10-CM

## 2017-04-27 DIAGNOSIS — Z13.31 SCREENING FOR DEPRESSION: ICD-10-CM

## 2017-04-27 LAB — HBA1C MFR BLD HPLC: 9.1 %

## 2017-04-27 RX ORDER — INSULIN GLARGINE 100 [IU]/ML
INJECTION, SOLUTION SUBCUTANEOUS
Qty: 1 EACH | Refills: 3 | Status: SHIPPED | OUTPATIENT
Start: 2017-04-27 | End: 2017-06-09 | Stop reason: ALTCHOICE

## 2017-04-27 NOTE — MR AVS SNAPSHOT
Visit Information Date & Time Provider Department Dept. Phone Encounter #  
 4/27/2017  2:55 PM Candance Cloud, [address] [phone] [id number] Follow-up Instructions Return in about 2 months (around 6/27/2017) for Diabetes follow up. Upcoming Health Maintenance Date Due  
 EYE EXAM RETINAL OR DILATED Q1 1/30/2018* HEMOGLOBIN A1C Q6M 7/9/2017 FOOT EXAM Q1 9/15/2017 MICROALBUMIN Q1 9/15/2017 LIPID PANEL Q1 9/15/2017 DTaP/Tdap/Td series (2 - Td) 9/15/2022 COLONOSCOPY 4/25/2026 *Topic was postponed. The date shown is not the original due date. Allergies as of 4/27/2017  Review Complete On: 4/27/2017 By: Karli Laws LPN No Known Allergies Current Immunizations  Reviewed on 2/27/2017 No immunizations on file. Not reviewed this visit You Were Diagnosed With   
  
 Codes Comments Uncontrolled type 2 diabetes mellitus with hyperosmolarity without coma, unspecified long term insulin use status    -  Primary ICD-10-CM: E11.00 ICD-9-CM: 250.22 Tobacco abuse     ICD-10-CM: Z72.0 ICD-9-CM: 305.1 Essential hypertension     ICD-10-CM: I10 
ICD-9-CM: 401.9 Overweight     ICD-10-CM: H75.9 ICD-9-CM: 278.02 Screening for depression     ICD-10-CM: Z13.89 ICD-9-CM: V79.0 Uncontrolled type 2 diabetes mellitus with microalbuminuria, with long-term current use of insulin (HCC)     ICD-10-CM: E11.29, E11.65, R80.9, Z79.4 ICD-9-CM: 250.42, 791.0, V58.67 Vitals BP Pulse Temp Resp Height(growth percentile) Weight(growth percentile) 134/89 85 98.1 °F (36.7 °C) (Oral) 17 6' 3\" (1.905 m) 232 lb (105.2 kg) SpO2 BMI Smoking Status 95% 29 kg/m2 Current Every Day Smoker Vitals History BMI and BSA Data Body Mass Index Body Surface Area  
 29 kg/m 2 2.36 m 2 Preferred Pharmacy Pharmacy Name Phone  [id number] - [place], [place] - [address] [place] [address] [phone] Your Updated Medication List  
  
   
This list is accurate as of: 4/27/17  3:58 PM.  Always use your most recent med list.  
  
  
  
  
 atorvastatin 80 mg tablet Commonly known as:  LIPITOR Take 1 Tab by mouth daily. budesonide-formoterol 160-4.5 mcg/actuation HFA inhaler Commonly known as:  SYMBICORT Take 2 Puffs by inhalation two (2) times a day. cyclobenzaprine 10 mg tablet Commonly known as:  FLEXERIL Take 1 Tab by mouth three (3) times daily as needed for Muscle Spasm(s). gabapentin 600 mg tablet Commonly known as:  NEURONTIN Take  by mouth two (2) times a day. glipiZIDE 10 mg tablet Commonly known as:  GLUCOTROL  
TAKE ONE TABLET BY MOUTH TWICE DAILY  
  
 glucose blood VI test strips strip Commonly known as:  TRUE METRIX GLUCOSE TEST STRIP Test blood sugars 2 x daily. ibuprofen 600 mg tablet Commonly known as:  MOTRIN  
TAKE ONE TABLET BY MOUTH EVERY 8 HOURS AS NEEDED FOR PAIN  
  
 insulin glargine 100 unit/mL (3 mL) pen Commonly known as:  LANTUS SOLOSTAR Inject 33 units once per day at bedtime  Indications: type 2 diabetes mellitus Insulin Needles (Disposable) 31 gauge x 1/4\" Ndle Commonly known as:  COMFORT EZ PEN NEEDLES  
1 Units by Does Not Apply route daily. Lancets Misc Check blood glucose daily  
  
 lisinopril-hydroCHLOROthiazide 20-12.5 mg per tablet Commonly known as:  PRINZIDE, ZESTORETIC  
TAKE ONE TABLET BY MOUTH ONCE DAILY  
  
 metFORMIN 1,000 mg tablet Commonly known as:  GLUCOPHAGE  
TAKE ONE TABLET BY MOUTH TWICE DAILY WITH  MEALS  
  
 oxyCODONE-acetaminophen 5-325 mg per tablet Commonly known as:  PERCOCET SITagliptin 50 mg tablet Commonly known as:  Johanna Bohr Take 1 Tab by mouth daily. tiotropium bromide 1.25 mcg/actuation inhaler Commonly known as:  [place] Take 2 Puffs by inhalation daily. TRUE METRIX GLUCOSE METER [facility] Generic drug:  Blood-Glucose Meter USE TO CHECK GLUCOSE ONCE DAILY Prescriptions Sent to Pharmacy Refills  
 insulin glargine (LANTUS SOLOSTAR) 100 unit/mL (3 mL) pen 3 Sig: Inject 33 units once per day at bedtime  Indications: type 2 diabetes mellitus Class: Normal  
 Pharmacy: [address] #: [phone] We Performed the Following AMB POC HEMOGLOBIN A1C [14690 CPT(R)] CBC WITH AUTOMATED DIFF [51221 CPT(R)] COLLECTION CAPILLARY BLOOD SPECIMEN [13580 CPT(R)] METABOLIC PANEL, COMPREHENSIVE [83393 CPT(R)] [address] [facility] [ [facility]] CA SMOKING AND TOBACCO USE CESSATION 3 - 10 MINUTES [07835 CPT(R)] REFERRAL TO CARDIOLOGY [AMU48 Custom] Comments:  
 Please evaluate patient for CAD. Hx of HTN, DM, smoking. Follow-up Instructions Return in about 2 months (around 6/27/2017) for Diabetes follow up. Referral Information Referral ID Referred By Referred To  
  
 [id number] Leti ANNA Not Available Visits Status Start Date End Date 1 New Request 4/27/17 4/27/18 If your referral has a status of pending review or denied, additional information will be sent to support the outcome of this decision. Patient Instructions 1. We increased your Lantus to 33 units every day 2. A cardiology consult was placed. Please call [phone] to schedule an appointment with Dr. Uri Aguilar. Learning About Benefits From Quitting Smoking How does quitting smoking make you healthier? If you're thinking about quitting smoking, you may have a few reasons to be smoke-free. Your health may be one of them. · When you quit smoking, you lower your risks for cancer, lung disease, heart attack, stroke, blood vessel disease, and blindness from macular degeneration. · When you're smoke-free, you get sick less often, and you heal faster. You are less likely to get colds, flu, bronchitis, and pneumonia. · As a nonsmoker, you may find that your mood is better and you are less stressed. When and how will you feel healthier? Quitting has real health benefits that start from day 1 of being smoke-free. And the longer you stay smoke-free, the healthier you get and the better you feel. The first hours · After just 20 minutes, your blood pressure and heart rate go down. That means there's less stress on your heart and blood vessels. · Within 12 hours, the level of carbon monoxide in your blood drops back to normal. That makes room for more oxygen. With more oxygen in your body, you may notice that you have more energy than when you smoked. After 2 weeks · Your lungs start to work better. · Your risk of heart attack starts to drop. After 1 month · When your lungs are clear, you cough less and breathe deeper, so it's easier to be active. · Your sense of taste and smell return. That means you can enjoy food more than you have since you started smoking. Over the years · After 1 year, your risk of heart disease is half what it would be if you kept smoking. · After 5 years, your risk of stroke starts to shrink. Within a few years after that, it's about the same as if you'd never smoked. · After 10 years, your risk of dying from lung cancer is cut by about half. And your risk for many other types of cancer is lower too. How would quitting help others in your life? When you quit smoking, you improve the health of everyone who now breathes in your smoke. · Their heart, lung, and cancer risks drop, much like yours. · They are sick less. For babies and small children, living smoke-free means they're less likely to have ear infections, pneumonia, and bronchitis. · If you're a woman who is or will be pregnant someday, quitting smoking means a healthier . · Children who are close to you are less likely to become adult smokers. Where can you learn more? Go to http://jaz-bhavna.info/. Enter [id number] [id number] [id number] in the search box to learn more about \"Learning About Benefits From Quitting Smoking. \" Current as of: May 26, 2016 Content Version: 11.2 © 7487-6427 [employer]. Care instructions adapted under license by [employer] (which disclaims liability or warranty for this information). If you have questions about a medical condition or this instruction, always ask your healthcare professional. [place] [address] any warranty or liability for your use of this information. Learning About Diabetes Food Guidelines Your Care Instructions Meal planning is important to manage diabetes. It helps keep your blood sugar at a target level (which you set with your doctor). You don't have to eat special foods. You can eat what your family eats, including sweets once in a while. But you do have to pay attention to how often you eat and how much you eat of certain foods. You may want to work with a dietitian or a certified diabetes educator (CDE) to help you plan meals and snacks. A dietitian or CDE can also help you lose weight if that is one of your goals. What should you know about eating carbs? Managing the amount of carbohydrate (carbs) you eat is an important part of healthy meals when you have diabetes. Carbohydrate is found in many foods. · Learn which foods have carbs. And learn the amounts of carbs in different foods. ¨ Bread, cereal, pasta, and rice have about 15 grams of carbs in a serving. A serving is 1 slice of bread (1 ounce), ½ cup of cooked cereal, or 1/3 cup of cooked pasta or rice. ¨ Fruits have 15 grams of carbs in a serving. A serving is 1 small fresh fruit, such as an apple or orange; ½ of a banana; ½ cup of cooked or canned fruit; ½ cup of fruit juice; 1 cup of melon or raspberries; or 2 tablespoons of dried fruit. ¨ Milk and no-sugar-added yogurt have 15 grams of carbs in a serving. A serving is 1 cup of milk or 2/3 cup of no-sugar-added yogurt. ¨ Starchy vegetables have 15 grams of carbs in a serving. A serving is ½ cup of mashed potatoes or sweet potato; 1 cup winter squash; ½ of a small baked potato; ½ cup of cooked beans; or ½ cup cooked corn or green peas. · Learn how much carbs to eat each day and at each meal. A dietitian or CDE can teach you how to keep track of the amount of carbs you eat. This is called carbohydrate counting. · If you are not sure how to count carbohydrate grams, use the Plate Method to plan meals. It is a good, quick way to make sure that you have a balanced meal. It also helps you spread carbs throughout the day. ¨ Divide your plate by types of foods. Put non-starchy vegetables on half the plate, meat or other protein food on one-quarter of the plate, and a grain or starchy vegetable in the final quarter of the plate. To this you can add a small piece of fruit and 1 cup of milk or yogurt, depending on how many carbs you are supposed to eat at a meal. 
· Try to eat about the same amount of carbs at each meal. Do not \"save up\" your daily allowance of carbs to eat at one meal. 
· Proteins have very little or no carbs per serving. Examples of proteins are beef, chicken, turkey, fish, eggs, tofu, cheese, cottage cheese, and peanut butter. A serving size of meat is 3 ounces, which is about the size of a deck of cards. Examples of meat substitute serving sizes (equal to 1 ounce of meat) are 1/4 cup of cottage cheese, 1 egg, 1 tablespoon of peanut butter, and ½ cup of tofu. How can you eat out and still eat healthy? · Learn to estimate the serving sizes of foods that have carbohydrate. If you measure food at home, it will be easier to estimate the amount in a serving of restaurant food.  
· If the meal you order has too much carbohydrate (such as potatoes, corn, or baked beans), ask to have a low-carbohydrate food instead. Ask for a salad or green vegetables. · If you use insulin, check your blood sugar before and after eating out to help you plan how much to eat in the future. · If you eat more carbohydrate at a meal than you had planned, take a walk or do other exercise. This will help lower your blood sugar. What else should you know? · Limit saturated fat, such as the fat from meat and dairy products. This is a healthy choice because people who have diabetes are at higher risk of heart disease. So choose lean cuts of meat and nonfat or low-fat dairy products. Use olive or canola oil instead of butter or shortening when cooking. · Don't skip meals. Your blood sugar may drop too low if you skip meals and take insulin or certain medicines for diabetes. · Check with your doctor before you drink alcohol. Alcohol can cause your blood sugar to drop too low. Alcohol can also cause a bad reaction if you take certain diabetes medicines. Follow-up care is a key part of your treatment and safety. Be sure to make and go to all appointments, and call your doctor if you are having problems. It's also a good idea to know your test results and keep a list of the medicines you take. Where can you learn more? Go to http://jaz-bhavna.info/. Enter H485 in the search box to learn more about \"Learning About Diabetes Food Guidelines. \" Current as of: May 23, 2016 Content Version: 11.2 © 1121-6636 [employer]. Care instructions adapted under license by [employer] (which disclaims liability or warranty for this information). If you have questions about a medical condition or this instruction, always ask your healthcare professional. [place] [address] any warranty or liability for your use of this information. Introducing [facility]!    
 Noman Hernandez introduces [employer] patient portal. Now you can access parts of your medical record, email your doctor's office, and request medication refills online. 1. In your internet browser, go to [email or web address] 2. Click on the First Time User? Click Here link in the Sign In box. You will see the New Member Sign Up page. 3. Enter your [employer] Access Code exactly as it appears below. You will not need to use this code after youve completed the sign-up process. If you do not sign up before the expiration date, you must request a new code. · [employer] Access Code: [email or web address] Expires: 7/26/2017  3:58 PM 
 
4. Enter the last four digits of your Social Security Number (xxxx) and Date of Birth (mm/dd/yyyy) as indicated and click Submit. You will be taken to the next sign-up page. 5. Create a Storemates ID. This will be your St[employer]ates login ID and cannot be changed, so think of one that is secure and easy to remember. 6. Create a [employer] password. You can change your password at any time. 7. Enter your Password Reset Question and Answer. This can be used at a later time if you forget your password. 8. Enter your e-mail address. You will receive e-mail notification when new information is available in [address] 9. Click Sign Up. You can now view and download portions of your medical record. 10. Click the Download Summary menu link to download a portable copy of your medical information. If you have questions, please visit the Frequently Asked Questions section of the [employer] website. Remember, [employer] is NOT to be used for urgent needs. For medical emergencies, dial 911. Now available from your iPhone and Android! Please provide this summary of care documentation to your next provider. Your primary care clinician is listed as Tru Johnson. If you have any questions after today's visit, please call [phone].

## 2017-04-27 NOTE — LETTER
[address] [facility] 
[address] [facility] [address] [facility] 
[phone]  
 
                                                                                           
4/27/2017 [facility] [place] [address] Dear Mr. Aury Cardozo, 
 
 
I strongly suggest that you stop smoking or use of any tobacco products. This may be the most important thing you can do for your health. While medicines may help, the most important thing for you is the decision to quit. If you need a \"Quit \", please call 3-538-QUIT NOW ([phone]). If you need help with nicotine withdrawal, I suggest over-the-counter nicotine replacement aids such as nicotine gum or patches, used as directed. As you may know, use of tobacco products increases your risk for many forms of cancer, heart disease, stroke, and blood clotting. Your body is very forgiving. Quit now and improve your health! Medicare  CPT 44924 Dx Z95.379 Counseling for smoking cessation 5 minutes Because of your smoking history (greater than 30 pack years by your history), I suggest a yearly low dose CT scan as a screen for lung cancer. Please call Jobie Holter to help arrange and authorize any tests and/or referrals. Her # is [phone] Sincerely, 
 
 
Caleb Eng MD 
 
 
[address] [facility] 
[phone]

## 2017-04-27 NOTE — PATIENT INSTRUCTIONS
1.  We increased your Lantus to 33 units every day    2. A cardiology consult was placed. Please call [phone] to schedule an appointment with Dr. Justine Villanueva. Learning About Benefits From Quitting Smoking  How does quitting smoking make you healthier? If you're thinking about quitting smoking, you may have a few reasons to be smoke-free. Your health may be one of them. · When you quit smoking, you lower your risks for cancer, lung disease, heart attack, stroke, blood vessel disease, and blindness from macular degeneration. · When you're smoke-free, you get sick less often, and you heal faster. You are less likely to get colds, flu, bronchitis, and pneumonia. · As a nonsmoker, you may find that your mood is better and you are less stressed. When and how will you feel healthier? Quitting has real health benefits that start from day 1 of being smoke-free. And the longer you stay smoke-free, the healthier you get and the better you feel. The first hours  · After just 20 minutes, your blood pressure and heart rate go down. That means there's less stress on your heart and blood vessels. · Within 12 hours, the level of carbon monoxide in your blood drops back to normal. That makes room for more oxygen. With more oxygen in your body, you may notice that you have more energy than when you smoked. After 2 weeks  · Your lungs start to work better. · Your risk of heart attack starts to drop. After 1 month  · When your lungs are clear, you cough less and breathe deeper, so it's easier to be active. · Your sense of taste and smell return. That means you can enjoy food more than you have since you started smoking. Over the years  · After 1 year, your risk of heart disease is half what it would be if you kept smoking. · After 5 years, your risk of stroke starts to shrink. Within a few years after that, it's about the same as if you'd never smoked.   · After 10 years, your risk of dying from lung cancer is cut by about half. And your risk for many other types of cancer is lower too. How would quitting help others in your life? When you quit smoking, you improve the health of everyone who now breathes in your smoke. · Their heart, lung, and cancer risks drop, much like yours. · They are sick less. For babies and small children, living smoke-free means they're less likely to have ear infections, pneumonia, and bronchitis. · If you're a woman who is or will be pregnant someday, quitting smoking means a healthier . · Children who are close to you are less likely to become adult smokers. Where can you learn more? Go to http://jazzSoupbhavna.info/. Enter [id number] [id number] [id number] in the search box to learn more about \"Learning About Benefits From Quitting Smoking. \"  Current as of: May 26, 2016  Content Version: 11.2  © 0427-1367 [employer]. Care instructions adapted under license by [employer] (which disclaims liability or warranty for this information). If you have questions about a medical condition or this instruction, always ask your healthcare professional. [address] any warranty or liability for your use of this information. Learning About Diabetes Food Guidelines  Your Care Instructions  Meal planning is important to manage diabetes. It helps keep your blood sugar at a target level (which you set with your doctor). You don't have to eat special foods. You can eat what your family eats, including sweets once in a while. But you do have to pay attention to how often you eat and how much you eat of certain foods. You may want to work with a dietitian or a certified diabetes educator (CDE) to help you plan meals and snacks. A dietitian or CDE can also help you lose weight if that is one of your goals. What should you know about eating carbs? Managing the amount of carbohydrate (carbs) you eat is an important part of healthy meals when you have diabetes. Carbohydrate is found in many foods. · Learn which foods have carbs. And learn the amounts of carbs in different foods. ¨ Bread, cereal, pasta, and rice have about 15 grams of carbs in a serving. A serving is 1 slice of bread (1 ounce), ½ cup of cooked cereal, or 1/3 cup of cooked pasta or rice. ¨ Fruits have 15 grams of carbs in a serving. A serving is 1 small fresh fruit, such as an apple or orange; ½ of a banana; ½ cup of cooked or canned fruit; ½ cup of fruit juice; 1 cup of melon or raspberries; or 2 tablespoons of dried fruit. ¨ Milk and no-sugar-added yogurt have 15 grams of carbs in a serving. A serving is 1 cup of milk or 2/3 cup of no-sugar-added yogurt. ¨ Starchy vegetables have 15 grams of carbs in a serving. A serving is ½ cup of mashed potatoes or sweet potato; 1 cup winter squash; ½ of a small baked potato; ½ cup of cooked beans; or ½ cup cooked corn or green peas. · Learn how much carbs to eat each day and at each meal. A dietitian or CDE can teach you how to keep track of the amount of carbs you eat. This is called carbohydrate counting. · If you are not sure how to count carbohydrate grams, use the Plate Method to plan meals. It is a good, quick way to make sure that you have a balanced meal. It also helps you spread carbs throughout the day. ¨ Divide your plate by types of foods. Put non-starchy vegetables on half the plate, meat or other protein food on one-quarter of the plate, and a grain or starchy vegetable in the final quarter of the plate. To this you can add a small piece of fruit and 1 cup of milk or yogurt, depending on how many carbs you are supposed to eat at a meal.  · Try to eat about the same amount of carbs at each meal. Do not \"save up\" your daily allowance of carbs to eat at one meal.  · Proteins have very little or no carbs per serving. Examples of proteins are beef, chicken, turkey, fish, eggs, tofu, cheese, cottage cheese, and peanut butter.  A serving size of meat is 3 ounces, which is about the size of a deck of cards. Examples of meat substitute serving sizes (equal to 1 ounce of meat) are 1/4 cup of cottage cheese, 1 egg, 1 tablespoon of peanut butter, and ½ cup of tofu. How can you eat out and still eat healthy? · Learn to estimate the serving sizes of foods that have carbohydrate. If you measure food at home, it will be easier to estimate the amount in a serving of restaurant food. · If the meal you order has too much carbohydrate (such as potatoes, corn, or baked beans), ask to have a low-carbohydrate food instead. Ask for a salad or green vegetables. · If you use insulin, check your blood sugar before and after eating out to help you plan how much to eat in the future. · If you eat more carbohydrate at a meal than you had planned, take a walk or do other exercise. This will help lower your blood sugar. What else should you know? · Limit saturated fat, such as the fat from meat and dairy products. This is a healthy choice because people who have diabetes are at higher risk of heart disease. So choose lean cuts of meat and nonfat or low-fat dairy products. Use olive or canola oil instead of butter or shortening when cooking. · Don't skip meals. Your blood sugar may drop too low if you skip meals and take insulin or certain medicines for diabetes. · Check with your doctor before you drink alcohol. Alcohol can cause your blood sugar to drop too low. Alcohol can also cause a bad reaction if you take certain diabetes medicines. Follow-up care is a key part of your treatment and safety. Be sure to make and go to all appointments, and call your doctor if you are having problems. It's also a good idea to know your test results and keep a list of the medicines you take. Where can you learn more? Go to http://jaz-bhavna.info/. Enter U676 in the search box to learn more about \"Learning About Diabetes Food Guidelines. \"  Current as of: May 23, 2016  Content Version: 11.2  © 7691-3356 [employer]. Care instructions adapted under license by [employer] (which disclaims liability or warranty for this information). If you have questions about a medical condition or this instruction, always ask your healthcare professional. [address] any warranty or liability for your use of this information.

## 2017-04-27 NOTE — PROGRESS NOTES
Chief Complaint   Patient presents with    Diabetes     follow up     1. Have you been to the ER, urgent care clinic since your last visit? Hospitalized since your last visit? No    2. Have you seen or consulted any other health care providers outside of the [address] [place] [address] since your last visit? Include any pap smears or colon screening.  No
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
## 2017-04-28 LAB
ALBUMIN SERPL-MCNC: 3.9 G/DL (ref 3.6–4.8)
ALBUMIN/GLOB SERPL: 1.4 {RATIO} (ref 1.2–2.2)
ALP SERPL-CCNC: 52 IU/L (ref 39–117)
ALT SERPL-CCNC: 45 IU/L (ref 0–44)
AST SERPL-CCNC: 31 IU/L (ref 0–40)
BASOPHILS # BLD AUTO: 0 X10E3/UL (ref 0–0.2)
BASOPHILS NFR BLD AUTO: 0 %
BILIRUB SERPL-MCNC: 0.3 MG/DL (ref 0–1.2)
BUN SERPL-MCNC: 21 MG/DL (ref 8–27)
BUN/CREAT SERPL: 25 (ref 10–24)
CALCIUM SERPL-MCNC: 9.3 MG/DL (ref 8.6–10.2)
CHLORIDE SERPL-SCNC: 98 MMOL/L (ref 96–106)
CO2 SERPL-SCNC: 23 MMOL/L (ref 18–29)
CREAT SERPL-MCNC: 0.84 MG/DL (ref 0.76–1.27)
EOSINOPHIL # BLD AUTO: 0.3 X10E3/UL (ref 0–0.4)
EOSINOPHIL NFR BLD AUTO: 4 %
ERYTHROCYTE [DISTWIDTH] IN BLOOD BY AUTOMATED COUNT: 13.5 % (ref 12.3–15.4)
GLOBULIN SER CALC-MCNC: 2.7 G/DL (ref 1.5–4.5)
GLUCOSE SERPL-MCNC: 215 MG/DL (ref 65–99)
HCT VFR BLD AUTO: 44.6 % (ref 37.5–51)
HGB BLD-MCNC: 15.2 G/DL (ref 12.6–17.7)
IMM GRANULOCYTES # BLD: 0 X10E3/UL (ref 0–0.1)
IMM GRANULOCYTES NFR BLD: 0 %
LYMPHOCYTES # BLD AUTO: 2.9 X10E3/UL (ref 0.7–3.1)
LYMPHOCYTES NFR BLD AUTO: 38 %
MCH RBC QN AUTO: 29.7 PG (ref 26.6–33)
MCHC RBC AUTO-ENTMCNC: 34.1 G/DL (ref 31.5–35.7)
MCV RBC AUTO: 87 FL (ref 79–97)
MONOCYTES # BLD AUTO: 0.5 X10E3/UL (ref 0.1–0.9)
MONOCYTES NFR BLD AUTO: 6 %
NEUTROPHILS # BLD AUTO: 3.9 X10E3/UL (ref 1.4–7)
NEUTROPHILS NFR BLD AUTO: 52 %
PLATELET # BLD AUTO: 242 X10E3/UL (ref 150–379)
POTASSIUM SERPL-SCNC: 4.3 MMOL/L (ref 3.5–5.2)
PROT SERPL-MCNC: 6.6 G/DL (ref 6–8.5)
RBC # BLD AUTO: 5.11 X10E6/UL (ref 4.14–5.8)
SODIUM SERPL-SCNC: 138 MMOL/L (ref 134–144)
WBC # BLD AUTO: 7.6 X10E3/UL (ref 3.4–10.8)

## 2017-04-28 NOTE — PROGRESS NOTES
CBC normal  Elevated BG  GFR is stable  ALT remains elevated but trending down  Will consider liver US and hepatitis panel at next visit; he does have hx of +Hep C screen but is poor to follow up with GI specialist  Will mail results

## 2017-06-09 ENCOUNTER — OFFICE VISIT (OUTPATIENT)
Dept: FAMILY MEDICINE CLINIC | Age: 62
End: 2017-06-09

## 2017-06-09 ENCOUNTER — TELEPHONE (OUTPATIENT)
Dept: FAMILY MEDICINE CLINIC | Age: 62
End: 2017-06-09

## 2017-06-09 VITALS
DIASTOLIC BLOOD PRESSURE: 90 MMHG | BODY MASS INDEX: 28.85 KG/M2 | HEIGHT: 75 IN | HEART RATE: 69 BPM | WEIGHT: 232 LBS | RESPIRATION RATE: 16 BRPM | OXYGEN SATURATION: 97 % | SYSTOLIC BLOOD PRESSURE: 154 MMHG | TEMPERATURE: 97.3 F

## 2017-06-09 DIAGNOSIS — R80.1 PERSISTENT PROTEINURIA: ICD-10-CM

## 2017-06-09 DIAGNOSIS — Z72.0 TOBACCO ABUSE: ICD-10-CM

## 2017-06-09 DIAGNOSIS — R76.8 HEPATITIS C ANTIBODY TEST POSITIVE: ICD-10-CM

## 2017-06-09 DIAGNOSIS — I10 ESSENTIAL HYPERTENSION: ICD-10-CM

## 2017-06-09 RX ORDER — INSULIN GLARGINE 100 [IU]/ML
INJECTION, SOLUTION SUBCUTANEOUS
Qty: 1 PEN | Refills: 3 | Status: SHIPPED | OUTPATIENT
Start: 2017-06-09 | End: 2017-06-12 | Stop reason: ALTCHOICE

## 2017-06-09 NOTE — PATIENT INSTRUCTIONS
Insulin Glargine (By injection)   Insulin Glargine, Recombinant (INdottie GLACarmel, tinaKOM-mark-modesto)  Treats diabetes. Brand Name(s): Basaglar, Lantus, Lantus SoloStar, Toujeo   There may be other brand names for this medicine. When This Medicine Should Not Be Used: This medicine is not right for everyone. Do not use it if you had an allergic reaction to insulin glargine. How to Use This Medicine:   Injectable  · Your healthcare provider will work with you to personalize your dose and treatment based on your insulin needs and lifestyle. You will be taught how to give yourself the injections. Make sure you understand all instructions. Ask the doctor, nurse, or pharmacist if you have questions. · If you use insulin once a day, it is best to use it at about the same time every day. · Always double-check both the concentration (strength) of your insulin and your dose. Concentration and dose are not the same. The dose is how many units of insulin you will use. The concentration tells how many units of insulin are in each milliliter (mL), such as 100 units/mL (U-100), but this does not mean you will use 100 units at a time. · Read and follow the patient instructions that come with this medicine. Talk to your doctor or pharmacist if you have any questions. · This medicine should look clear before you use it. Do not shake the vial. Do not mix this medicine with any other insulin or with water. · You will be shown the body areas where this shot can be given. Use a different body area each time you give yourself a shot. Keep track of where you give each shot to make sure you rotate body areas. · Use a new needle and syringe each time you inject your medicine. If you use a syringe, use only the kind that is made for insulin injections. Some insulin must be given with a specific type of syringe or needle. Ask your pharmacist if you are not sure which one to use.   · Always check the label before use, to make sure you have the correct type of insulin. Do not change the brand, type, or concentration unless your doctor tells you to. If you use a pump or other device, make sure the insulin is made for that device. · Unopened medicine: Store the vials, cartridges, and SoloStar® pens in the refrigerator. Protect from light. Do not freeze. · Opened medicine:   ¨ Vials: Store in the refrigerator or at room temperature in a cool place, away from sunlight and heat. Use within 28 days. ¨ Cartridge or [employer]: Store at room temperature, away from direct heat and light. Do not refrigerate. Throw away any opened cartridge or Lantus® SoloStar® pen after 28 days. Throw away any opened [employer] after 42 days. · Throw away used needles in a hard, closed container that the needles cannot poke through. Keep this container away from children and pets. Drugs and Foods to Avoid:   Ask your doctor or pharmacist before using any other medicine, including over-the-counter medicines, vitamins, and herbal products. · Some medicines can change the amount of insulin you need to use and make it harder for you to control your diabetes. Tell your doctor about all other medicines that you are using. · Do not drink alcohol while you are using this medicine. Warnings While Using This Medicine:   · Tell your doctor if you are pregnant or breastfeeding, or if you have kidney disease, liver disease, heart disease, or heart failure. · This medicine may cause the following problems:  ¨ Low blood sugar or low potassium levels in the blood  ¨ Fluid retention or heart failure (when used with thiazolidinedione [TZD] medicine)  · Never share insulin pens, needles, or cartridges with anyone. Sharing these can pass hepatitis viruses, HIV, or other illnesses from one person to another. · Keep all medicine out of the reach of children. Never share your medicine with anyone.   Possible Side Effects While Using This Medicine:   Call your doctor right away if you notice any of these side effects:  · Allergic reaction: Itching or hives, swelling in your face or hands, swelling or tingling in your mouth or throat, chest tightness, trouble breathing  · Dry mouth, increased thirst, muscle cramps, nausea or vomiting, uneven heartbeat  · Rapid weight gain, swelling in your hands, ankles, or feet, trouble breathing, tiredness  · Shaking, trembling, sweating, fast or pounding heartbeat, lightheadedness, hunger, confusion  If you notice these less serious side effects, talk with your doctor:   · Redness, pain, itching, swelling, or any skin changes where the shot was given  If you notice other side effects that you think are caused by this medicine, tell your doctor. Call your doctor for medical advice about side effects. You may report side effects to FDA at 8-074-FDA-5415  © 2017 [facility] Information is for End User's use only and may not be sold, redistributed or otherwise used for commercial purposes. The above information is an  only. It is not intended as medical advice for individual conditions or treatments. Talk to your doctor, nurse or pharmacist before following any medical regimen to see if it is safe and effective for you.
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
## 2017-06-09 NOTE — TELEPHONE ENCOUNTER
Per fax from pharmacy, a prior Chucho Kim is needed on the lantus solostar.  Please contact [phone] to initiate prior auth

## 2017-06-12 RX ORDER — INSULIN GLARGINE 100 [IU]/ML
INJECTION, SOLUTION SUBCUTANEOUS
Qty: 1 PEN | Refills: 6 | Status: SHIPPED | OUTPATIENT
Start: 2017-06-12 | End: 2018-04-01 | Stop reason: SDUPTHER

## 2017-06-27 RX ORDER — PEN NEEDLE, DIABETIC 32GX 5/32"
NEEDLE, DISPOSABLE MISCELLANEOUS
Qty: 30 PEN NEEDLE | Refills: 11 | Status: SHIPPED | OUTPATIENT
Start: 2017-06-27 | End: 2017-07-18 | Stop reason: SDUPTHER

## 2017-07-18 RX ORDER — PEN NEEDLE, DIABETIC 29 G X1/2"
NEEDLE, DISPOSABLE MISCELLANEOUS
Qty: 30 PEN NEEDLE | Refills: 11 | Status: SHIPPED | OUTPATIENT
Start: 2017-07-18 | End: 2018-07-16 | Stop reason: SDUPTHER

## 2017-07-29 DIAGNOSIS — I10 HTN, GOAL BELOW 140/90: ICD-10-CM

## 2017-07-31 RX ORDER — LISINOPRIL AND HYDROCHLOROTHIAZIDE 12.5; 2 MG/1; MG/1
TABLET ORAL
Qty: 90 TAB | Refills: 0 | Status: SHIPPED | OUTPATIENT
Start: 2017-07-31 | End: 2017-10-30 | Stop reason: SDUPTHER

## 2017-07-31 RX ORDER — GLIPIZIDE 10 MG/1
TABLET ORAL
Qty: 180 TAB | Refills: 0 | Status: SHIPPED | OUTPATIENT
Start: 2017-07-31 | End: 2017-11-07 | Stop reason: SDUPTHER

## 2017-07-31 RX ORDER — METFORMIN HYDROCHLORIDE 1000 MG/1
TABLET ORAL
Qty: 180 TAB | Refills: 0 | Status: SHIPPED | OUTPATIENT
Start: 2017-07-31 | End: 2017-11-07 | Stop reason: SDUPTHER

## 2017-09-28 DIAGNOSIS — J41.0 SIMPLE CHRONIC BRONCHITIS (HCC): ICD-10-CM

## 2017-09-28 RX ORDER — TIOTROPIUM BROMIDE INHALATION SPRAY 1.56 UG/1
SPRAY, METERED RESPIRATORY (INHALATION)
Qty: 1 INHALER | Refills: 3 | Status: SHIPPED | OUTPATIENT
Start: 2017-09-28 | End: 2018-04-01 | Stop reason: SDUPTHER

## 2017-10-05 ENCOUNTER — OFFICE VISIT (OUTPATIENT)
Dept: FAMILY MEDICINE CLINIC | Age: 62
End: 2017-10-05

## 2017-10-05 VITALS
HEIGHT: 75 IN | HEART RATE: 80 BPM | DIASTOLIC BLOOD PRESSURE: 82 MMHG | RESPIRATION RATE: 16 BRPM | WEIGHT: 241 LBS | TEMPERATURE: 98.6 F | OXYGEN SATURATION: 98 % | SYSTOLIC BLOOD PRESSURE: 132 MMHG | BODY MASS INDEX: 29.97 KG/M2

## 2017-10-05 DIAGNOSIS — J41.0 SIMPLE CHRONIC BRONCHITIS (HCC): ICD-10-CM

## 2017-10-05 DIAGNOSIS — R80.1 PERSISTENT PROTEINURIA: ICD-10-CM

## 2017-10-05 DIAGNOSIS — I10 ESSENTIAL HYPERTENSION: ICD-10-CM

## 2017-10-05 DIAGNOSIS — Z72.0 TOBACCO ABUSE: ICD-10-CM

## 2017-10-05 DIAGNOSIS — L98.9 SKIN LESION: ICD-10-CM

## 2017-10-05 LAB
ALBUMIN UR QL STRIP: 150 MG/L
CREATININE, URINE POC: 100 MG/DL
MICROALBUMIN/CREAT RATIO POC: 300 MG/G

## 2017-10-05 RX ORDER — ALBUTEROL SULFATE 90 UG/1
AEROSOL, METERED RESPIRATORY (INHALATION)
COMMUNITY
End: 2017-10-05 | Stop reason: SDUPTHER

## 2017-10-05 RX ORDER — ALBUTEROL SULFATE 90 UG/1
2 AEROSOL, METERED RESPIRATORY (INHALATION)
Qty: 1 INHALER | Refills: 3 | Status: SHIPPED | OUTPATIENT
Start: 2017-10-05 | End: 2018-02-03 | Stop reason: SDUPTHER

## 2017-10-05 NOTE — PATIENT INSTRUCTIONS
Dermatology Referral:    Emily [id number]    MD Damaris Lloyd, [address], [facility], [address] [place] [address] [address]  [phone]           Learning About Benefits From Quitting Smoking  How does quitting smoking make you healthier? If you're thinking about quitting smoking, you may have a few reasons to be smoke-free. Your health may be one of them. · When you quit smoking, you lower your risks for cancer, lung disease, heart attack, stroke, blood vessel disease, and blindness from macular degeneration. · When you're smoke-free, you get sick less often, and you heal faster. You are less likely to get colds, flu, bronchitis, and pneumonia. · As a nonsmoker, you may find that your mood is better and you are less stressed. When and how will you feel healthier? Quitting has real health benefits that start from day 1 of being smoke-free. And the longer you stay smoke-free, the healthier you get and the better you feel. The first hours  · After just 20 minutes, your blood pressure and heart rate go down. That means there's less stress on your heart and blood vessels. · Within 12 hours, the level of carbon monoxide in your blood drops back to normal. That makes room for more oxygen. With more oxygen in your body, you may notice that you have more energy than when you smoked. After 2 weeks  · Your lungs start to work better. · Your risk of heart attack starts to drop. After 1 month  · When your lungs are clear, you cough less and breathe deeper, so it's easier to be active. · Your sense of taste and smell return. That means you can enjoy food more than you have since you started smoking. Over the years  · After 1 year, your risk of heart disease is half what it would be if you kept smoking. · After 5 years, your risk of stroke starts to shrink. Within a few years after that, it's about the same as if you'd never smoked.   · After 10 years, your risk of dying from lung cancer is cut by about half. And your risk for many other types of cancer is lower too. How would quitting help others in your life? When you quit smoking, you improve the health of everyone who now breathes in your smoke. · Their heart, lung, and cancer risks drop, much like yours. · They are sick less. For babies and small children, living smoke-free means they're less likely to have ear infections, pneumonia, and bronchitis. · If you're a woman who is or will be pregnant someday, quitting smoking means a healthier . · Children who are close to you are less likely to become adult smokers. Where can you learn more? Go to http://jaz-bhavna.info/. Enter 052 [id number] 72 11 in the search box to learn more about \"Learning About Benefits From Quitting Smoking. \"  Current as of: 2017  Content Version: 11.3  © 8469-0298 [employer]. Care instructions adapted under license by [employer] (which disclaims liability or warranty for this information). If you have questions about a medical condition or this instruction, always ask your healthcare professional. [place] [address] any warranty or liability for your use of this information.

## 2017-10-05 NOTE — MR AVS SNAPSHOT
Visit Information Date & Time Provider Department Dept. Phone Encounter #  
 10/5/2017  9:00 AM Ronald Salas, [facility] [phone] [id number] Follow-up Instructions Return in about 2 months (around 12/5/2017) for Diabetes follow up. Upcoming Health Maintenance Date Due INFLUENZA AGE 9 TO ADULT 8/1/2017 FOOT EXAM Q1 9/15/2017 MICROALBUMIN Q1 9/15/2017 LIPID PANEL Q1 9/15/2017 HEMOGLOBIN A1C Q6M 10/27/2017 EYE EXAM RETINAL OR DILATED Q1 4/24/2018 DTaP/Tdap/Td series (2 - Td) 9/15/2022 COLONOSCOPY 4/25/2026 Allergies as of 10/5/2017  Review Complete On: 10/5/2017 By: Ronald Salas MD  
 No Known Allergies Current Immunizations  Reviewed on 2/27/2017 No immunizations on file. Not reviewed this visit You Were Diagnosed With   
  
 Codes Comments Uncontrolled type 2 diabetes mellitus with stage 3 chronic kidney disease, with long-term current use of insulin (HCC)    -  Primary ICD-10-CM: E11.22, E11.65, N18.3, Z79.4 ICD-9-CM: 250.52, 585.3, V58.67 Persistent proteinuria     ICD-10-CM: R80.1 ICD-9-CM: 791.0 Tobacco abuse     ICD-10-CM: Z72.0 ICD-9-CM: 305.1 Essential hypertension     ICD-10-CM: I10 
ICD-9-CM: 401.9 Skin lesion     ICD-10-CM: L98.9 ICD-9-CM: 709.9 Simple chronic bronchitis (HCC)     ICD-10-CM: J41.0 ICD-9-CM: 491.0 Vitals BP Pulse Temp Resp Height(growth percentile) Weight(growth percentile) 132/82 (BP 1 Location: Left arm, BP Patient Position: Sitting) 80 98.6 °F (37 °C) (Oral) 16 6' 3\" (1.905 m) 241 lb (109.3 kg) SpO2 BMI Smoking Status 98% 30.12 kg/m2 Current Every Day Smoker Vitals History BMI and BSA Data Body Mass Index Body Surface Area  
 30.12 kg/m 2 2.4 m 2 Preferred Pharmacy Pharmacy Name Phone [address], [address] [place] [address] [phone] Your Updated Medication List  
  
   
This list is accurate as of: 10/5/17  9:46 AM.  Always use your most recent med list.  
  
  
  
  
 albuterol 90 mcg/actuation inhaler Commonly known as:  VENTOLIN HFA Take 2 Puffs by inhalation every four (4) hours as needed for Wheezing. atorvastatin 80 mg tablet Commonly known as:  LIPITOR Take 1 Tab by mouth daily. budesonide-formoterol 160-4.5 mcg/actuation Hfaa Commonly known as:  SYMBICORT Take 2 Puffs by inhalation two (2) times a day. cyclobenzaprine 10 mg tablet Commonly known as:  FLEXERIL Take 1 Tab by mouth three (3) times daily as needed for Muscle Spasm(s). gabapentin 600 mg tablet Commonly known as:  NEURONTIN Take  by mouth two (2) times a day. glipiZIDE 10 mg tablet Commonly known as:  GLUCOTROL  
TAKE ONE TABLET BY MOUTH TWICE DAILY  
  
 glucose blood VI test strips strip Commonly known as:  TRUE METRIX GLUCOSE TEST STRIP Test blood sugars 2 x daily. ibuprofen 600 mg tablet Commonly known as:  MOTRIN  
TAKE ONE TABLET BY MOUTH EVERY 8 HOURS AS NEEDED FOR PAIN  
  
 insulin glargine 100 unit/mL (3 mL) Inpn Commonly known as:  BASAGLAR KWIKPEN  
33 units at night Insulin Needles (Disposable) 31 gauge x 1/4\" Ndle Commonly known as:  ULTICARE PEN NEEDLE  
USE ONE PEN NEEDLE DAILY Lancets Misc Check blood glucose daily  
  
 lisinopril-hydroCHLOROthiazide 20-12.5 mg per tablet Commonly known as:  PRINZIDE, ZESTORETIC  
TAKE ONE TABLET BY MOUTH ONCE DAILY  
  
 metFORMIN 1,000 mg tablet Commonly known as:  GLUCOPHAGE  
TAKE ONE TABLET BY MOUTH TWICE DAILY WITH  MEALS  
  
 oxyCODONE-acetaminophen 5-325 mg per tablet Commonly known as:  PERCOCET SITagliptin 50 mg tablet Commonly known as:  Wilfredo Norlander Take 1 Tab by mouth daily. SPIRIVA RESPIMAT 1.25 mcg/actuation inhaler Generic drug:  tiotropium bromide INHALE TWO PUFFS BY MOUTH ONCE DAILY TRUE METRIX GLUCOSE METER Misc Generic drug:  Blood-Glucose Meter USE TO CHECK GLUCOSE ONCE DAILY Prescriptions Sent to Pharmacy Refills  
 albuterol (VENTOLIN HFA) 90 mcg/actuation inhaler 3 Sig: Take 2 Puffs by inhalation every four (4) hours as needed for Wheezing. Class: Normal  
 Pharmacy: [address] #: [phone] Route: Inhalation We Performed the Following AMB POC URINE, MICROALBUMIN, SEMIQUANTITATIVE [02843 CPT(R)] HEMOGLOBIN A1C WITH EAG [95336 CPT(R)]  DIABETES FOOT EXAM [HM7 Custom] LIPID PANEL [88386 CPT(R)] METABOLIC PANEL, COMPREHENSIVE [86862 CPT(R)] REFERRAL TO DERMATOLOGY [REF19 Custom] Comments:  
 Please evaluate patient for skin cancer surveillance. Follow-up Instructions Return in about 2 months (around 12/5/2017) for Diabetes follow up. Referral Information Referral ID Referred By Referred To  
  
 [id number] [address] [facility]   
   [address] Phone: [phone] Fax: [phone] Visits Status Start Date End Date 1 New Request 10/5/17 10/5/18 If your referral has a status of pending review or denied, additional information will be sent to support the outcome of this decision. Patient Instructions Dermatology Referral: 
 
Emily [id number] MD Darby Graham, [address], [address] 
[phone] Learning About Benefits From Quitting Smoking How does quitting smoking make you healthier? If you're thinking about quitting smoking, you may have a few reasons to be smoke-free. Your health may be one of them. · When you quit smoking, you lower your risks for cancer, lung disease, heart attack, stroke, blood vessel disease, and blindness from macular degeneration. · When you're smoke-free, you get sick less often, and you heal faster. You are less likely to get colds, flu, bronchitis, and pneumonia. · As a nonsmoker, you may find that your mood is better and you are less stressed. When and how will you feel healthier? Quitting has real health benefits that start from day 1 of being smoke-free. And the longer you stay smoke-free, the healthier you get and the better you feel. The first hours · After just 20 minutes, your blood pressure and heart rate go down. That means there's less stress on your heart and blood vessels. · Within 12 hours, the level of carbon monoxide in your blood drops back to normal. That makes room for more oxygen. With more oxygen in your body, you may notice that you have more energy than when you smoked. After 2 weeks · Your lungs start to work better. · Your risk of heart attack starts to drop. After 1 month · When your lungs are clear, you cough less and breathe deeper, so it's easier to be active. · Your sense of taste and smell return. That means you can enjoy food more than you have since you started smoking. Over the years · After 1 year, your risk of heart disease is half what it would be if you kept smoking. · After 5 years, your risk of stroke starts to shrink. Within a few years after that, it's about the same as if you'd never smoked. · After 10 years, your risk of dying from lung cancer is cut by about half. And your risk for many other types of cancer is lower too. How would quitting help others in your life? When you quit smoking, you improve the health of everyone who now breathes in your smoke. · Their heart, lung, and cancer risks drop, much like yours. · They are sick less. For babies and small children, living smoke-free means they're less likely to have ear infections, pneumonia, and bronchitis. · If you're a woman who is or will be pregnant someday, quitting smoking means a healthier . · Children who are close to you are less likely to become adult smokers. Where can you learn more? Go to http://jaz-bhavna.info/. Enter 052 [id number] [id number] 11 in the search box to learn more about \"Learning About Benefits From Quitting Smoking. \" Current as of: March 20, 2017 Content Version: 11.3 © 1108-0804 [employer]. Care instructions adapted under license by [employer] (which disclaims liability or warranty for this information). If you have questions about a medical condition or this instruction, always ask your healthcare professional. [address] any warranty or liability for your use of this information. Introducing [facility]! Denys García introduces Bion[employer] patient portal. Now you can access parts of your medical record, email your doctor's office, and request medication refills online. 1. In your internet browser, go to [email or web address] 2. Click on the First Time User? Click Here link in the Sign In box. You will see the New Member Sign Up page. 3. Enter your Bion[employer]is Access Code exactly as it appears below. You will not need to use this code after youve completed the sign-up process. If you do not sign up before the expiration date, you must request a new code. · [employer] Access Code: [email or web address] Expires: 1/3/2018  9:46 AM 
 
4. Enter the last four digits of your Social Security Number (xxxx) and Date of Birth (mm/dd/yyyy) as indicated and click Submit. You will be taken to the next sign-up page. 5. Create a Brandkidst ID. This will be your Bionaturis login ID and cannot be changed, so think of one that is secure and easy to remember. 6. Create a [employer] password. You can change your password at any time. 7. Enter your Password Reset Question and Answer. This can be used at a later time if you forget your password. 8. Enter your e-mail address.  You will receive e-mail notification when new information is available in ZUCHEM. 9. Click Sign Up. You can now view and download portions of your medical record. 10. Click the Download Summary menu link to download a portable copy of your medical information. If you have questions, please visit the Frequently Asked Questions section of the ZUCHEM website. Remember, ZUCHEM is NOT to be used for urgent needs. For medical emergencies, dial 911. Now available from your iPhone and Android! Please provide this summary of care documentation to your next provider. Your primary care clinician is listed as Altagracia Burks. If you have any questions after today's visit, please call [phone].
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
## 2017-10-06 LAB
ALBUMIN SERPL-MCNC: 4.1 G/DL (ref 3.6–4.8)
ALBUMIN/GLOB SERPL: 1.5 {RATIO} (ref 1.2–2.2)
ALP SERPL-CCNC: 55 IU/L (ref 39–117)
ALT SERPL-CCNC: 52 IU/L (ref 0–44)
AST SERPL-CCNC: 42 IU/L (ref 0–40)
BILIRUB SERPL-MCNC: 0.3 MG/DL (ref 0–1.2)
BUN SERPL-MCNC: 17 MG/DL (ref 8–27)
BUN/CREAT SERPL: 16 (ref 10–24)
CALCIUM SERPL-MCNC: 9.5 MG/DL (ref 8.6–10.2)
CHLORIDE SERPL-SCNC: 93 MMOL/L (ref 96–106)
CHOLEST SERPL-MCNC: 165 MG/DL (ref 100–199)
CO2 SERPL-SCNC: 22 MMOL/L (ref 18–29)
CREAT SERPL-MCNC: 1.08 MG/DL (ref 0.76–1.27)
EST. AVERAGE GLUCOSE BLD GHB EST-MCNC: 197 MG/DL
GLOBULIN SER CALC-MCNC: 2.7 G/DL (ref 1.5–4.5)
GLUCOSE SERPL-MCNC: 233 MG/DL (ref 65–99)
HBA1C MFR BLD: 8.5 % (ref 4.8–5.6)
HDLC SERPL-MCNC: 49 MG/DL
INTERPRETATION, 910389: NORMAL
INTERPRETATION: NORMAL
LDLC SERPL CALC-MCNC: 91 MG/DL (ref 0–99)
Lab: NORMAL
PDF IMAGE, 910387: NORMAL
POTASSIUM SERPL-SCNC: 4.5 MMOL/L (ref 3.5–5.2)
PROT SERPL-MCNC: 6.8 G/DL (ref 6–8.5)
SODIUM SERPL-SCNC: 134 MMOL/L (ref 134–144)
TRIGL SERPL-MCNC: 123 MG/DL (ref 0–149)
VLDLC SERPL CALC-MCNC: 25 MG/DL (ref 5–40)

## 2017-10-17 ENCOUNTER — TELEPHONE (OUTPATIENT)
Dept: FAMILY MEDICINE CLINIC | Age: 62
End: 2017-10-17

## 2017-10-17 NOTE — PROGRESS NOTES
A1c is better, still well above goal  Other labs look good  Will call to discuss adjusting insulin regimen.

## 2017-10-17 NOTE — TELEPHONE ENCOUNTER
Attempted to call to discuss lab results. Left VM to call back. Labs show that his A1c is better but still not at goal.  We will likely need to increase his insulin dosing. Will attempt to call back tomorrow to given further instructions.      Cam Stewart MD

## 2017-10-18 NOTE — TELEPHONE ENCOUNTER
Returned call to patient. ID'd x2    Discussed A1c of 8.5%. Improved but not at goal.  He is reluctant to increase dose of insulin due to having a couple of night time low BGs (40-50s) over the past few weeks. After discussion, we decided to stop bedtime dose of Glipizide. Keep insulin the same. Work on diet. I think it would be very beneficial to know his mealtime blood sugars, which he currently does not check. Patient is to take BG logs before meals in addition to fasting. Phone follow up in 2 weeks to review logs.   Can also consider moving insulin dose to AM, but he would like to stop Glipizide and assess mealtime BGs first.     Hilario Schwab, MD

## 2017-10-30 DIAGNOSIS — I10 HTN, GOAL BELOW 140/90: ICD-10-CM

## 2017-10-30 RX ORDER — CALCIUM CITRATE/VITAMIN D3 200MG-6.25
TABLET ORAL
Qty: 50 STRIP | Refills: 1 | Status: SHIPPED | OUTPATIENT
Start: 2017-10-30 | End: 2018-01-02 | Stop reason: SDUPTHER

## 2017-10-31 RX ORDER — LISINOPRIL AND HYDROCHLOROTHIAZIDE 12.5; 2 MG/1; MG/1
TABLET ORAL
Qty: 31 TAB | Refills: 2 | Status: SHIPPED | OUTPATIENT
Start: 2017-10-31 | End: 2017-11-13 | Stop reason: SDUPTHER

## 2017-11-13 ENCOUNTER — TELEPHONE (OUTPATIENT)
Dept: FAMILY MEDICINE CLINIC | Age: 62
End: 2017-11-13

## 2017-11-13 NOTE — TELEPHONE ENCOUNTER
Phone follow up from prior office visit    Patient ID'd x2  No hypoglycemic episodes since stopping bedtime dose of Glipizide  Fasting BGs 120-130s when compliant with his medications. Pt admits to not being compliant with his insulin and Metformin recently  Not adhering to low carb diet  Had fasting BGs as high as 400s    We discussed importance of following diet and medication compliance  I advised increasing insulin to 35u at bedtime. He is very reluctant to do so and wants to remain at 33u.     Will have him follow up as planned in January 2018  Call sooner if blood sugars are not at goal or any issues with low BGs    Delfino Suresh MD

## 2017-11-13 NOTE — TELEPHONE ENCOUNTER
Patient returning call to Dr. Raul Lizarraga. Patient stated please call at your availability.       Thanks

## 2018-01-04 RX ORDER — CALCIUM CITRATE/VITAMIN D3 200MG-6.25
TABLET ORAL
Qty: 50 STRIP | Refills: 1 | Status: SHIPPED | OUTPATIENT
Start: 2018-01-04 | End: 2018-01-31 | Stop reason: SDUPTHER

## 2018-01-19 ENCOUNTER — OFFICE VISIT (OUTPATIENT)
Dept: FAMILY MEDICINE CLINIC | Age: 63
End: 2018-01-19

## 2018-01-19 VITALS
HEART RATE: 99 BPM | WEIGHT: 240 LBS | HEIGHT: 75 IN | RESPIRATION RATE: 20 BRPM | TEMPERATURE: 97.7 F | SYSTOLIC BLOOD PRESSURE: 131 MMHG | BODY MASS INDEX: 29.84 KG/M2 | OXYGEN SATURATION: 95 % | DIASTOLIC BLOOD PRESSURE: 79 MMHG

## 2018-01-19 DIAGNOSIS — R07.9 CHEST PAIN, UNSPECIFIED TYPE: ICD-10-CM

## 2018-01-19 DIAGNOSIS — R76.8 HEPATITIS C ANTIBODY TEST POSITIVE: ICD-10-CM

## 2018-01-19 DIAGNOSIS — R80.1 PERSISTENT PROTEINURIA: ICD-10-CM

## 2018-01-19 DIAGNOSIS — G89.29 CHRONIC LOW BACK PAIN, UNSPECIFIED BACK PAIN LATERALITY, WITH SCIATICA PRESENCE UNSPECIFIED: ICD-10-CM

## 2018-01-19 DIAGNOSIS — M54.5 CHRONIC LOW BACK PAIN, UNSPECIFIED BACK PAIN LATERALITY, WITH SCIATICA PRESENCE UNSPECIFIED: ICD-10-CM

## 2018-01-19 DIAGNOSIS — Z72.0 TOBACCO ABUSE: ICD-10-CM

## 2018-01-19 DIAGNOSIS — I10 ESSENTIAL HYPERTENSION: ICD-10-CM

## 2018-01-19 DIAGNOSIS — J41.0 SIMPLE CHRONIC BRONCHITIS (HCC): ICD-10-CM

## 2018-01-19 DIAGNOSIS — E66.3 OVERWEIGHT: ICD-10-CM

## 2018-01-19 NOTE — MR AVS SNAPSHOT
[address] 
[phone] Patient: Mode Kingsley MRN: [id number] XDH:[id number] Visit Information Date & Time Provider Department Dept. Phone Encounter #  
 1/19/2018  2:15 PM Sunil Sahu, [facility] [phone] [id number] Follow-up Instructions Return in about 3 months (around 4/19/2018) for Diabetes follow up. Upcoming Health Maintenance Date Due HEMOGLOBIN A1C Q6M 4/5/2018 EYE EXAM RETINAL OR DILATED Q1 4/24/2018 FOOT EXAM Q1 10/5/2018 MICROALBUMIN Q1 10/5/2018 LIPID PANEL Q1 10/5/2018 DTaP/Tdap/Td series (2 - Td) 9/15/2022 COLONOSCOPY 4/25/2026 Allergies as of 1/19/2018  Review Complete On: 1/19/2018 By: Donny Acharya No Known Allergies Current Immunizations  Reviewed on 2/27/2017 No immunizations on file. Not reviewed this visit You Were Diagnosed With   
  
 Codes Comments Uncontrolled type 2 diabetes mellitus with stage 3 chronic kidney disease, with long-term current use of insulin (HCC)    -  Primary ICD-10-CM: E11.22, E11.65, N18.3, Z79.4 ICD-9-CM: 250.52, 585.3, V58.67 Persistent proteinuria     ICD-10-CM: R80.1 ICD-9-CM: 791.0 Hepatitis C antibody test positive     ICD-10-CM: R76.8 ICD-9-CM: 795.79 Tobacco abuse     ICD-10-CM: Z72.0 ICD-9-CM: 305.1 Essential hypertension     ICD-10-CM: I10 
ICD-9-CM: 401.9 Chest pain, unspecified type     ICD-10-CM: R07.9 ICD-9-CM: 786.50 Vitals BP Pulse Temp Resp Height(growth percentile) Weight(growth percentile) 131/79 (BP 1 Location: Left arm, BP Patient Position: Sitting) 99 97.7 °F (36.5 °C) (Oral) 20 6' 3\" (1.905 m) 240 lb (108.9 kg) SpO2 BMI Smoking Status 95% 30 kg/m2 Current Every Day Smoker Vitals History BMI and BSA Data  Body Mass Index Body Surface Area  
 30 kg/m 2 2.4 m 2  
  
  
 Preferred Pharmacy Pharmacy Name Phone [address] [phone] Your Updated Medication List  
  
   
This list is accurate as of: 1/19/18  3:00 PM.  Always use your most recent med list.  
  
  
  
  
 albuterol 90 mcg/actuation inhaler Commonly known as:  VENTOLIN HFA Take 2 Puffs by inhalation every four (4) hours as needed for Wheezing. atorvastatin 80 mg tablet Commonly known as:  LIPITOR Take 1 Tab by mouth daily. cyclobenzaprine 10 mg tablet Commonly known as:  FLEXERIL Take 1 Tab by mouth three (3) times daily as needed for Muscle Spasm(s). gabapentin 600 mg tablet Commonly known as:  NEURONTIN Take  by mouth two (2) times a day. glipiZIDE 10 mg tablet Commonly known as:  GLUCOTROL  
TAKE ONE TABLET BY MOUTH TWICE DAILY  
  
 ibuprofen 600 mg tablet Commonly known as:  MOTRIN  
TAKE ONE TABLET BY MOUTH EVERY 8 HOURS AS NEEDED FOR PAIN  
  
 insulin glargine 100 unit/mL (3 mL) Inpn Commonly known as:  BASAGLAR KWIKPEN  
33 units at night Insulin Needles (Disposable) 31 gauge x 1/4\" Ndle Commonly known as:  ULTICARE PEN NEEDLE  
USE ONE PEN NEEDLE DAILY Lancets Misc Check blood glucose daily  
  
 lisinopril-hydroCHLOROthiazide 20-12.5 mg per tablet Commonly known as:  PRINZIDE, ZESTORETIC  
TAKE ONE TABLET BY MOUTH ONCE DAILY  
  
 metFORMIN 1,000 mg tablet Commonly known as:  GLUCOPHAGE  
TAKE ONE TABLET BY MOUTH TWICE DAILY WITH MEALS  
  
 oxyCODONE-acetaminophen 5-325 mg per tablet Commonly known as:  PERCOCET  
  
 SPIRIVA RESPIMAT 1.25 mcg/actuation inhaler Generic drug:  tiotropium bromide INHALE TWO PUFFS BY MOUTH ONCE DAILY  
  
 TRUE METRIX GLUCOSE METER Misc Generic drug:  Blood-Glucose Meter USE TO CHECK GLUCOSE ONCE DAILY  
  
 TRUE METRIX GLUCOSE TEST STRIP strip Generic drug:  glucose blood VI test strips USE ONE STRIP TO CHECK GLUCOSE ONCE DAILY We Performed the Following HEMOGLOBIN A1C WITH EAG [36914 CPT(R)] METABOLIC PANEL, BASIC [17512 CPT(R)] REFERRAL TO CARDIOLOGY [WMW10 Custom] Comments:  
 Please evaluate patient for recurrent chest pain and risk assessment. RF: smoking, diabetes, HTN. Follow-up Instructions Return in about 3 months (around 4/19/2018) for Diabetes follow up. Referral Information Referral ID Referred By Referred To  
  
 [id number] Dafne Emerson MD   
   [facility] [address] Phone: [phone] Fax: [phone] Visits Status Start Date End Date 1 New Request 1/19/18 1/19/19 If your referral has a status of pending review or denied, additional information will be sent to support the outcome of this decision. Patient Instructions Dr. Louise Nur of [place] [address] [place] [phone]  
 
or 
 
[address] [place] [address] [address] Get Directions [phone] Introducing [facility]! Bee Wells introduces [employer] patient portal. Now you can access parts of your medical record, email your doctor's office, and request medication refills online. 1. In your internet browser, go to [email or web address] 2. Click on the First Time User? Click Here link in the Sign In box. You will see the New Member Sign Up page. 3. Enter your [employer] Access Code exactly as it appears below. You will not need to use this code after youve completed the sign-up process. If you do not sign up before the expiration date, you must request a new code. · [employer] Access Code: [email or web address] Expires: 4/19/2018  3:00 PM 
 
4. Enter the last four digits of your Social Security Number (xxxx) and Date of Birth (mm/dd/yyyy) as indicated and click Submit. You will be taken to the next sign-up page. 5. Create a [employer] ID. This will be your [employer] login ID and cannot be changed, so think of one that is secure and easy to remember. 6. Create a [employer] password. You can change your password at any time. 7. Enter your Password Reset Question and Answer. This can be used at a later time if you forget your password. 8. Enter your e-mail address. You will receive e-mail notification when new information is available in [address] 9. Click Sign Up. You can now view and download portions of your medical record. 10. Click the Download Summary menu link to download a portable copy of your medical information. If you have questions, please visit the Frequently Asked Questions section of the [employer] website. Remember, [employer] is NOT to be used for urgent needs. For medical emergencies, dial 911. Now available from your iPhone and Android! Please provide this summary of care documentation to your next provider. Your primary care clinician is listed as Dany Burks. If you have any questions after today's visit, please call [phone].

## 2018-01-19 NOTE — PROGRESS NOTES
Chief Complaint   Patient presents with    Follow-up     diabetes     1. Have you been to the ER, urgent care clinic since your last visit? Hospitalized since your last visit? No    2. Have you seen or consulted any other health care providers outside of the [address] [place] [address] since your last visit? Include any pap smears or colon screening.  No
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
## 2018-01-19 NOTE — PATIENT INSTRUCTIONS
Dr. Inge Francis  [facility] [address] [place] [id number] [facility]  [address]   [place], [address] [place] [address]  [phone]     or    [address] [place] [address] Road  [address] [place] [address]  Get Directions  [phone]

## 2018-01-20 LAB
BUN SERPL-MCNC: 13 MG/DL (ref 8–27)
BUN/CREAT SERPL: 15 (ref 10–24)
CALCIUM SERPL-MCNC: 9.4 MG/DL (ref 8.6–10.2)
CHLORIDE SERPL-SCNC: 93 MMOL/L (ref 96–106)
CO2 SERPL-SCNC: 24 MMOL/L (ref 18–29)
CREAT SERPL-MCNC: 0.89 MG/DL (ref 0.76–1.27)
EST. AVERAGE GLUCOSE BLD GHB EST-MCNC: 212 MG/DL
GLUCOSE SERPL-MCNC: 429 MG/DL (ref 65–99)
HBA1C MFR BLD: 9 % (ref 4.8–5.6)
INTERPRETATION: NORMAL
Lab: NORMAL
POTASSIUM SERPL-SCNC: 4.5 MMOL/L (ref 3.5–5.2)
SODIUM SERPL-SCNC: 135 MMOL/L (ref 134–144)

## 2018-01-22 ENCOUNTER — TELEPHONE (OUTPATIENT)
Dept: FAMILY MEDICINE CLINIC | Age: 63
End: 2018-01-22

## 2018-01-22 PROBLEM — H52.209 ASTIGMATISM: Status: ACTIVE | Noted: 2017-05-01

## 2018-01-22 NOTE — TELEPHONE ENCOUNTER
----- Message from Martínez Dyer sent at 1/22/2018  4:27 PM EST -----  Regarding: Dr. Osman Bailey  Patient returned Dr. Elmer Grace call. Best contact number is [phone]. Felix Valverde is waiting for patient to call back.

## 2018-01-22 NOTE — PROGRESS NOTES
A1c is worse  Will need to consider mealtime insulin as his fasting BGs are close to goal  Will call patient to have him RTC with mealtime BG readings

## 2018-01-22 NOTE — TELEPHONE ENCOUNTER
----- Message from Gladis Nicholson sent at 1/22/2018  4:27 PM EST -----  Regarding: Dr. Ruchi Akbar  Patient returned Dr. Jim Ponce call. Best contact number is [phone]. Marian Villaseñor is waiting for patient to call back.

## 2018-01-31 ENCOUNTER — OFFICE VISIT (OUTPATIENT)
Dept: FAMILY MEDICINE CLINIC | Age: 63
End: 2018-01-31

## 2018-01-31 ENCOUNTER — TELEPHONE (OUTPATIENT)
Dept: FAMILY MEDICINE CLINIC | Age: 63
End: 2018-01-31

## 2018-01-31 VITALS
HEART RATE: 90 BPM | RESPIRATION RATE: 20 BRPM | DIASTOLIC BLOOD PRESSURE: 82 MMHG | HEIGHT: 75 IN | BODY MASS INDEX: 30.11 KG/M2 | OXYGEN SATURATION: 96 % | WEIGHT: 242.2 LBS | SYSTOLIC BLOOD PRESSURE: 134 MMHG | TEMPERATURE: 97.8 F

## 2018-01-31 DIAGNOSIS — Z72.0 TOBACCO ABUSE: ICD-10-CM

## 2018-01-31 DIAGNOSIS — I10 ESSENTIAL HYPERTENSION: ICD-10-CM

## 2018-01-31 NOTE — TELEPHONE ENCOUNTER
[facility] have questions on quantity.       exenatide microspheres 2 mg/0.65 mL pnanand [[id number]]   Order Details   Dose: 2 mg Route: SubCUTAneous Frequency: EVERY 7 DAYS   Dispense Quantity:  1 Pen Refills:  2 Fills Remaining:  --           Si mg by SubCUTAneous route every seven (7) days.          Written Date:  18 Expiration Date:  --     Start Date:  18 End Date:  --            Ordering Provider:  -- JANNA #:  -- NPI:  --    Authorizing Provider:  Jostin Alexis MD JANNA #:  [id number]  NPI:  [id number]    Ordering User:  Jostin Alexis MD            Diagnosis Association: Uncontrolled type 2 diabetes mellitus with stage 3 chronic kidney disease, with long-term current use of insulin ([facility] (E11.22 , E11.65 , N18.3 , Z79.4)      Pharmacy:  [address] [place] [address] #:  --

## 2018-01-31 NOTE — MR AVS SNAPSHOT
[address] 
[phone] Patient: Paris Moser MRN: [id number] WIW:[birth year] Visit Information Date & Time Provider Department Dept. Phone Encounter #  
 1/31/2018  2:15 PM Betty Miller, 15[facility] [phone] [id number] Follow-up Instructions Return in about 4 weeks (around 2/28/2018) for Diabetes follow up with logs and med check. Your Appointments 2/1/2018  3:00 PM  
New Patient with tAtila Lowe MD  
[facility] ([facility]) Appt Note: ref by Dr. Albania Schulz/Harman  chest pain and risk assessment. RF: smoking, diabetes, HTN/Records in [facility]; Pt r/s from 2/13/18 ok to double book per Leyda Fields ref by Dr. Albania Schulz/Harman chest pain and risk assessment. RF: smoking, diabetes, HTN/Records in [address]  
[address] Upcoming Health Maintenance Date Due  
 EYE EXAM RETINAL OR DILATED Q1 4/24/2018 HEMOGLOBIN A1C Q6M 7/19/2018 FOOT EXAM Q1 10/5/2018 MICROALBUMIN Q1 10/5/2018 LIPID PANEL Q1 10/5/2018 DTaP/Tdap/Td series (2 - Td) 9/15/2022 COLONOSCOPY 4/25/2026 Allergies as of 1/31/2018  Review Complete On: 1/31/2018 By: Delfina Salazar No Known Allergies Current Immunizations  Reviewed on 2/27/2017 No immunizations on file. Not reviewed this visit You Were Diagnosed With   
  
 Codes Comments Uncontrolled type 2 diabetes mellitus with stage 3 chronic kidney disease, with long-term current use of insulin (HCC)    -  Primary ICD-10-CM: E11.22, E11.65, N18.3, Z79.4 ICD-9-CM: 250.52, 585.3, V58.67 Essential hypertension     ICD-10-CM: I10 
ICD-9-CM: 401.9 Tobacco abuse     ICD-10-CM: Z72.0 ICD-9-CM: 305.1 Vitals BP Pulse Temp Resp Height(growth percentile) Weight(growth percentile) 134/82 (BP 1 Location: Left arm, BP Patient Position: Sitting) 90 97.8 °F (36.6 °C) (Oral) 20 6' 3\" (1.905 m) 242 lb 3.2 oz (109.9 kg) SpO2 BMI Smoking Status 96% 30.27 kg/m2 Current Every Day Smoker Vitals History BMI and BSA Data Body Mass Index Body Surface Area  
 30.27 kg/m 2 2.41 m 2 Preferred Pharmacy Pharmacy Name Phone [address] [phone] Your Updated Medication List  
  
   
This list is accurate as of: 1/31/18  2:55 PM.  Always use your most recent med list.  
  
  
  
  
 albuterol 90 mcg/actuation inhaler Commonly known as:  VENTOLIN HFA Take 2 Puffs by inhalation every four (4) hours as needed for Wheezing. atorvastatin 80 mg tablet Commonly known as:  LIPITOR Take 1 Tab by mouth daily. cyclobenzaprine 10 mg tablet Commonly known as:  FLEXERIL Take 1 Tab by mouth three (3) times daily as needed for Muscle Spasm(s). exenatide microspheres 2 mg/0.65 mL Pnij  
2 mg by SubCUTAneous route every seven (7) days. gabapentin 600 mg tablet Commonly known as:  NEURONTIN Take  by mouth two (2) times a day. glipiZIDE 10 mg tablet Commonly known as:  GLUCOTROL  
TAKE ONE TABLET BY MOUTH TWICE DAILY  
  
 glucose blood VI test strips strip Commonly known as:  TRUE METRIX GLUCOSE TEST STRIP  
USE ONE STRIP TO CHECK GLUCOSE THREE TIMES DAILY  
  
 ibuprofen 600 mg tablet Commonly known as:  MOTRIN  
TAKE ONE TABLET BY MOUTH EVERY 8 HOURS AS NEEDED FOR PAIN  
  
 insulin glargine 100 unit/mL (3 mL) Inpn Commonly known as:  BASAGLAR KWIKPEN  
33 units at night Insulin Needles (Disposable) 31 gauge x 1/4\" Ndle Commonly known as:  ULTICARE PEN NEEDLE  
USE ONE PEN NEEDLE DAILY Lancets Misc Check blood glucose daily lisinopril-hydroCHLOROthiazide 20-12.5 mg per tablet Commonly known as:  PRINZIDE, ZESTORETIC  
TAKE ONE TABLET BY MOUTH ONCE DAILY  
  
 metFORMIN 1,000 mg tablet Commonly known as:  GLUCOPHAGE  
TAKE ONE TABLET BY MOUTH TWICE DAILY WITH MEALS  
  
 oxyCODONE-acetaminophen 5-325 mg per tablet Commonly known as:  PERCOCET  
  
 SPIRIVA RESPIMAT 1.25 mcg/actuation inhaler Generic drug:  tiotropium bromide INHALE TWO PUFFS BY MOUTH ONCE DAILY  
  
 TRUE METRIX GLUCOSE METER Misc Generic drug:  Blood-Glucose Meter USE TO CHECK GLUCOSE ONCE DAILY Prescriptions Sent to Pharmacy Refills  
 exenatide microspheres 2 mg/0.65 mL pnij 2 Si mg by SubCUTAneous route every seven (7) days. Class: Normal  
 Pharmacy: [address] [place] [address] #: [phone] Route: SubCUTAneous  
 glucose blood VI test strips (TRUE METRIX GLUCOSE TEST STRIP) strip 11 Sig: USE ONE STRIP TO CHECK GLUCOSE THREE TIMES DAILY Class: Normal  
 Pharmacy: [address] [place] [address] Ph #: [phone] Follow-up Instructions Return in about 4 weeks (around 2018) for Diabetes follow up with logs and med check. Patient Instructions Exenatide (By injection) Exenatide (fc-BS-p-tide) Treats type 2 diabetes. Brand Name(s): BYDUREON Pen, BYDUREON Single Dose Tray, Byetta There may be other brand names for this medicine. When This Medicine Should Not Be Used: This medicine is not right for everyone. Do not use it if you had an allergic reaction to exenatide. Do not use Bydureon® if you have multiple endocrine neoplasia syndrome type 2 (MEN 2) or if you or anyone in your family has had medullary thyroid cancer. How to Use This Medicine:  
Injectable · Your doctor will prescribe your exact dose and tell you how often it should be given. This medicine is given as a shot under your skin.  It is given into your stomach, thigh, or upper arm. · Bydureon® is given 1 time each week. Rolin Hardy  is given 2 times each day. Radha Denney is an extended-release form of Byetta®. Do not use both medicines together. · You may be taught how to give your medicine at home. Make sure you understand all instructions before giving yourself an injection. Do not use more medicine or use it more often than your doctor tells you to. · You will be shown the body areas where this shot can be given. Use a different body area each time you give yourself a shot. Keep track of where you give each shot to make sure you rotate body areas. · Use Bydureon® immediately once the powder has been dissolved and transferred to the syringe. The mixture should be off-white or cloudy. · Use a new needle and syringe each time you inject your medicine. · Never share medicine pens, needles, or syringes. There is a risk of infection if needles, pens, or syringes are used by more than one person. · Drink extra fluids so you will urinate more often and help prevent kidney problems. · This medicine should come with a Medication Guide. Ask your pharmacist for a copy if you do not have one. · Missed dose: ¨ Bydureon®: Use this medicine as soon as you remember, as long as your next dose is due at least 3 days later. Skip the missed dose and go back to your regular dosing schedule if your next dose is due 1 or 2 days later. Do not use 2 doses of this medicine fewer than 3 days apart. ¨ Byetta®: Skip the missed dose and inject your next regular dose. Never inject a missed dose after a meal. 
· Storage instructions: ¨ Bydureon®: Store your medicine in the refrigerator, in the original carton, and protect it from light. Do not freeze it. Do not use the medicine if the expiration date has passed. If needed, you may store the single-dose trays at room temperature for up to 4 weeks. ¨ Byetta®: Store your new, unused medicine pen in the refrigerator, in the original carton, and protect it from light. Do not freeze it. You may store the opened medicine pen in the refrigerator or at room temperature for 30 days. Remove the needle from the pen before you store the medicine. This prevents medicine from leaking and air bubbles from forming in the pen. Throw away the pen after you use it for 30 days, even if it still has medicine in it. · Throw away used needles in a hard, closed container that the needles cannot poke through. Keep this container away from children and pets. Drugs and Foods to Avoid: Ask your doctor or pharmacist before using any other medicine, including over-the-counter medicines, vitamins, and herbal products. · You should not use prandial insulin while you use Byetta®. · Take antibiotics or birth control pills at least 1 hour before you inject Byetta®. · Tell your doctor if you are using other diabetes medicine (such as insulin, glimepiride, glipizide, or glyburide), or if you take warfarin. Warnings While Using This Medicine: · Tell your doctor if you are pregnant or breastfeeding, or if you have gallstones, digestion problems (such as gastroparesis), a thyroid tumor, pancreas problems, kidney problems, or you had a kidney transplant. · This medicine may cause the following problems: ¨ An increased risk of thyroid tumor ¨ Pancreatitis ¨ Low blood sugar (when used with other diabetes medicine) · Your doctor will do lab tests at regular visits to check on the effects of this medicine. Keep all appointments. · Keep all medicine out of the reach of children. Never share your medicine with anyone. Possible Side Effects While Using This Medicine:  
Call your doctor right away if you notice any of these side effects: · Allergic reaction: Itching or hives, swelling in your face or hands, swelling or tingling in your mouth or throat, chest tightness, trouble breathing · Decrease in how much or how often you urinate, lower back or side pain, blood in your urine · Shaking, trembling, sweating, fast or pounding heartbeat, lightheadedness, hunger, confusion · Sudden and severe stomach pain, nausea, vomiting, fever, and lightheadedness If you notice these less serious side effects, talk with your doctor: · Feeling tired or weak · Headache · Mild nausea, constipation, diarrhea, or upset stomach · Redness, itching, bump, swelling, or any changes in your skin where the shot was given If you notice other side effects that you think are caused by this medicine, tell your doctor. Call your doctor for medical advice about side effects. You may report side effects to FDA at 4-185-HWH-5979 © 2017 [address] Information is for End User's use only and may not be sold, redistributed or otherwise used for commercial purposes. The above information is an  only. It is not intended as medical advice for individual conditions or treatments. Talk to your doctor, nurse or pharmacist before following any medical regimen to see if it is safe and effective for you. Introducing [facility]! [employer] introduces [employer] patient portal. Now you can access parts of your medical record, email your doctor's office, and request medication refills online. 1. In your internet browser, go to [email or web address] 2. Click on the First Time User? Click Here link in the Sign In box. You will see the New Member Sign Up page. 3. Enter your [employer] Access Code exactly as it appears below. You will not need to use this code after youve completed the sign-up process. If you do not sign up before the expiration date, you must request a new code. · [employer] Access Code: [email or web address] Expires: 4/19/2018  3:00 PM 
 
4.  Enter the last four digits of your Social Security Number (xxxx) and Date of Birth (mm/dd/yyyy) as indicated and click Submit. You will be taken to the next sign-up page. 5. Create a [employer] ID. This will be your [employer] login ID and cannot be changed, so think of one that is secure and easy to remember. 6. Create a [employer] password. You can change your password at any time. 7. Enter your Password Reset Question and Answer. This can be used at a later time if you forget your password. 8. Enter your e-mail address. You will receive e-mail notification when new information is available in [address] 9. Click Sign Up. You can now view and download portions of your medical record. 10. Click the Download Summary menu link to download a portable copy of your medical information. If you have questions, please visit the Frequently Asked Questions section of the [employer] website. Remember, [employer] is NOT to be used for urgent needs. For medical emergencies, dial 911. Now available from your iPhone and Android! Please provide this summary of care documentation to your next provider. Your primary care clinician is listed as Ginny Pollock. If you have any questions after today's visit, please call [phone].

## 2018-01-31 NOTE — PATIENT INSTRUCTIONS
Exenatide (By injection)   Exenatide (ny-NK-d-tide)  Treats type 2 diabetes. Brand Name(s): BYDUREON Pen, BYDUREON Single Dose Tray, Byetta   There may be other brand names for this medicine. When This Medicine Should Not Be Used: This medicine is not right for everyone. Do not use it if you had an allergic reaction to exenatide. Do not use Bydureon® if you have multiple endocrine neoplasia syndrome type 2 (MEN 2) or if you or anyone in your family has had medullary thyroid cancer. How to Use This Medicine:   Injectable  · Your doctor will prescribe your exact dose and tell you how often it should be given. This medicine is given as a shot under your skin. It is given into your stomach, thigh, or upper arm. · Bydureon® is given 1 time each week. Kathyanne Jain  is given 2 times each day. Tobi Shingles is an extended-release form of Byetta®. Do not use both medicines together. · You may be taught how to give your medicine at home. Make sure you understand all instructions before giving yourself an injection. Do not use more medicine or use it more often than your doctor tells you to. · You will be shown the body areas where this shot can be given. Use a different body area each time you give yourself a shot. Keep track of where you give each shot to make sure you rotate body areas. · Use Bydureon® immediately once the powder has been dissolved and transferred to the syringe. The mixture should be off-white or cloudy. · Use a new needle and syringe each time you inject your medicine. · Never share medicine pens, needles, or syringes. There is a risk of infection if needles, pens, or syringes are used by more than one person. · Drink extra fluids so you will urinate more often and help prevent kidney problems. · This medicine should come with a Medication Guide. Ask your pharmacist for a copy if you do not have one.   · Missed dose:   ¨ Bydureon®: Use this medicine as soon as you remember, as long as your next dose is due at least 3 days later. Skip the missed dose and go back to your regular dosing schedule if your next dose is due 1 or 2 days later. Do not use 2 doses of this medicine fewer than 3 days apart. ¨ Byetta®: Skip the missed dose and inject your next regular dose. Never inject a missed dose after a meal.  · Storage instructions:   ¨ Bydureon®: Store your medicine in the refrigerator, in the original carton, and protect it from light. Do not freeze it. Do not use the medicine if the expiration date has passed. If needed, you may store the single-dose trays at room temperature for up to 4 weeks. ¨ Byetta®: Store your new, unused medicine pen in the refrigerator, in the original carton, and protect it from light. Do not freeze it. You may store the opened medicine pen in the refrigerator or at room temperature for 30 days. Remove the needle from the pen before you store the medicine. This prevents medicine from leaking and air bubbles from forming in the pen. Throw away the pen after you use it for 30 days, even if it still has medicine in it. · Throw away used needles in a hard, closed container that the needles cannot poke through. Keep this container away from children and pets. Drugs and Foods to Avoid:   Ask your doctor or pharmacist before using any other medicine, including over-the-counter medicines, vitamins, and herbal products. · You should not use prandial insulin while you use Byetta®. · Take antibiotics or birth control pills at least 1 hour before you inject Byetta®. · Tell your doctor if you are using other diabetes medicine (such as insulin, glimepiride, glipizide, or glyburide), or if you take warfarin. Warnings While Using This Medicine:   · Tell your doctor if you are pregnant or breastfeeding, or if you have gallstones, digestion problems (such as gastroparesis), a thyroid tumor, pancreas problems, kidney problems, or you had a kidney transplant.   · This medicine may cause the following problems:  ¨ An increased risk of thyroid tumor  ¨ Pancreatitis  ¨ Low blood sugar (when used with other diabetes medicine)  · Your doctor will do lab tests at regular visits to check on the effects of this medicine. Keep all appointments. · Keep all medicine out of the reach of children. Never share your medicine with anyone. Possible Side Effects While Using This Medicine:   Call your doctor right away if you notice any of these side effects:  · Allergic reaction: Itching or hives, swelling in your face or hands, swelling or tingling in your mouth or throat, chest tightness, trouble breathing  · Decrease in how much or how often you urinate, lower back or side pain, blood in your urine  · Shaking, trembling, sweating, fast or pounding heartbeat, lightheadedness, hunger, confusion  · Sudden and severe stomach pain, nausea, vomiting, fever, and lightheadedness  If you notice these less serious side effects, talk with your doctor:   · Feeling tired or weak  · Headache  · Mild nausea, constipation, diarrhea, or upset stomach  · Redness, itching, bump, swelling, or any changes in your skin where the shot was given  If you notice other side effects that you think are caused by this medicine, tell your doctor. Call your doctor for medical advice about side effects. You may report side effects to FDA at 0-485-QNK-5540  © 2017 [facility] Information is for End User's use only and may not be sold, redistributed or otherwise used for commercial purposes. The above information is an  only. It is not intended as medical advice for individual conditions or treatments. Talk to your doctor, nurse or pharmacist before following any medical regimen to see if it is safe and effective for you.

## 2018-01-31 NOTE — PROGRESS NOTES
Chief Complaint   Patient presents with    Diabetes     followup         1. Have you been to the ER, urgent care clinic since your last visit? Hospitalized since your last visit? No    2. Have you seen or consulted any other health care providers outside of the [address] [place] [address] since your last visit? Include any pap smears or colon screening.  No
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
## 2018-02-01 ENCOUNTER — OFFICE VISIT (OUTPATIENT)
Dept: CARDIOLOGY CLINIC | Age: 63
End: 2018-02-01

## 2018-02-01 VITALS
WEIGHT: 242 LBS | SYSTOLIC BLOOD PRESSURE: 140 MMHG | DIASTOLIC BLOOD PRESSURE: 90 MMHG | RESPIRATION RATE: 16 BRPM | HEART RATE: 82 BPM | HEIGHT: 75 IN | OXYGEN SATURATION: 96 % | BODY MASS INDEX: 30.09 KG/M2

## 2018-02-01 DIAGNOSIS — R07.9 CHEST PAIN OF UNCERTAIN ETIOLOGY: Primary | ICD-10-CM

## 2018-02-01 DIAGNOSIS — E78.5 DYSLIPIDEMIA: ICD-10-CM

## 2018-02-01 DIAGNOSIS — I10 ESSENTIAL HYPERTENSION: ICD-10-CM

## 2018-02-01 RX ORDER — ROSUVASTATIN CALCIUM 10 MG/1
10 TABLET, COATED ORAL
Qty: 30 TAB | Refills: 12 | Status: SHIPPED | OUTPATIENT
Start: 2018-02-01 | End: 2018-03-06

## 2018-02-01 RX ORDER — GUAIFENESIN 100 MG/5ML
81 LIQUID (ML) ORAL DAILY
Qty: 30 TAB | Refills: 12
Start: 2018-02-01 | End: 2018-03-06

## 2018-02-01 NOTE — PROGRESS NOTES
Chief Complaint   Patient presents with    New Patient     Chest pain, htn, DMII     1. Have you been to the ER, urgent care clinic since your last visit? Hospitalized since your last visit? No    2. Have you seen or consulted any other health care providers outside of the [address] [place] [address] since your last visit? Include any pap smears or colon screening.  No
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
## 2018-02-01 NOTE — MR AVS SNAPSHOT
[address] [facility] [place] [address] [facility] 
[phone] Patient: Jeanie Vaughan MRN: [id number] BIU:[id number] Visit Information Date & Time Provider Department Dept. Phone Encounter #  
 2/1/2018  3:00 PM Harley Cole MD [facility] [phone] [id number] Your Appointments 2/9/2018  9:00 AM  
ECHO CARDIOGRAMS 2D with ECHO STPEPEIS  
[facility] ([facility] SCH[facility]UL[facility]) Appt Note: echo at 9am per dr Natanael Ritchie dx cp 1 day s-card  ht 6'3 wt [address] [facility] [place] [address] [facility] [facility]  
[place]  
  
    
 2/9/2018 10:00 AM  
NUCLEAR MEDICINE with NUCLEAR, [facility]  
[facility] ([facility]) Appt Note: echo at 9am per dr Natanael Ritchie dx cp 1 day s-card  ht 6'3 wt [address] [facility] [place] [address] [facility]  
[phone]  
  
   
 [address] [facility] [address] [facility] [address]  
  
    
 5/8/2018 11:00 AM  
ESTABLISHED PATIENT with Harley Cole MD  
[facility] ([facility]) Appt Note: 3 mo fu  
 [address] [facility] [place] [address] [facility]  
[address] [place] [address] [address] Upcoming Health Maintenance Date Due  
 EYE EXAM RETINAL OR DILATED Q1 4/24/2018 HEMOGLOBIN A1C Q6M 7/19/2018 FOOT EXAM Q1 10/5/2018 MICROALBUMIN Q1 10/5/2018 LIPID PANEL Q1 10/5/2018 DTaP/Tdap/Td series (2 - Td) 9/15/2022 COLONOSCOPY 4/25/2026 Allergies as of 2/1/2018  Review Complete On: 2/1/2018 By: Harley Cole MD  
 No Known Allergies Current Immunizations  Reviewed on 2/27/2017 No immunizations on file. Not reviewed this visit You Were Diagnosed With   
  
 Codes Comments Chest pain of uncertain etiology    -  Primary ICD-10-CM: R07.89 ICD-9-CM: 786.59 Dyslipidemia     ICD-10-CM: E78.5 ICD-9-CM: 272.4 Vitals BP Pulse Resp Height(growth percentile) Weight(growth percentile) SpO2  
 140/90 (BP 1 Location: Right arm, BP Patient Position: Sitting) 82 16 6' 3\" (1.905 m) 242 lb (109.8 kg) 96% BMI Smoking Status 30.25 kg/m2 Current Every Day Smoker Vitals History BMI and BSA Data Body Mass Index Body Surface Area  
 30.25 kg/m 2 2.41 m 2 Preferred Pharmacy Pharmacy Name Phone [facility] [phone] Your Updated Medication List  
  
   
This list is accurate as of: 2/1/18  4:32 PM.  Always use your most recent med list.  
  
  
  
  
 albuterol 90 mcg/actuation inhaler Commonly known as:  VENTOLIN HFA Take 2 Puffs by inhalation every four (4) hours as needed for Wheezing. aspirin 81 mg chewable tablet Take 1 Tab by mouth daily. gabapentin 600 mg tablet Commonly known as:  NEURONTIN Take  by mouth two (2) times a day. glipiZIDE 10 mg tablet Commonly known as:  GLUCOTROL  
TAKE ONE TABLET BY MOUTH TWICE DAILY  
  
 glucose blood VI test strips strip Commonly known as:  TRUE METRIX GLUCOSE TEST STRIP  
USE ONE STRIP TO CHECK GLUCOSE THREE TIMES DAILY  
  
 insulin glargine 100 unit/mL (3 mL) Inpn Commonly known as:  BASAGLAR KWIKPEN  
33 units at night Insulin Needles (Disposable) 31 gauge x 1/4\" Ndle Commonly known as:  ULTICARE PEN NEEDLE  
USE ONE PEN NEEDLE DAILY Lancets Misc Check blood glucose daily  
  
 lisinopril-hydroCHLOROthiazide 20-12.5 mg per tablet Commonly known as:  PRINZIDE, ZESTORETIC  
TAKE ONE TABLET BY MOUTH ONCE DAILY  
  
 metFORMIN 1,000 mg tablet Commonly known as:  GLUCOPHAGE  
TAKE ONE TABLET BY MOUTH TWICE DAILY WITH MEALS  
  
 oxyCODONE-acetaminophen 5-325 mg per tablet Commonly known as:  PERCOCET  
  
 rosuvastatin 10 mg tablet Commonly known as:  CRESTOR Take 1 Tab by mouth nightly. SPIRIVA RESPIMAT 1.25 mcg/actuation inhaler Generic drug:  tiotropium bromide INHALE TWO PUFFS BY MOUTH ONCE DAILY  
  
 TRUE METRIX GLUCOSE METER Misc Generic drug:  Blood-Glucose Meter USE TO CHECK GLUCOSE ONCE DAILY Prescriptions Sent to Pharmacy Refills  
 rosuvastatin (CRESTOR) 10 mg tablet 12 Sig: Take 1 Tab by mouth nightly. Class: Normal  
 Pharm[facility] [address] [place] Ph #: [phone] Route: Oral  
  
We Performed the Following AMB POC EKG ROUTINE W/ 12 LEADS, INTER & REP [29394 CPT(R)] CK [id number] CPT(R)] LIPID PANEL [91707 CPT(R)] METABOLIC PANEL, COMPREHENSIVE [24005 CPT(R)] Introducing [facility]! Francia Moreno introduces [employer] patient portal. Now you can access parts of your medical record, email your doctor's office, and request medication refills online. 1. In your internet browser, go to [email or web address] 2. Click on the First Time User? Click Here link in the Sign In box. You will see the New Member Sign Up page. 3. Enter your [employer] Access Code exactly as it appears below. You will not need to use this code after youve completed the sign-up process. If you do not sign up before the expiration date, you must request a new code. · [employer] Access Code: [email or web address] Expires: 4/19/2018  3:00 PM 
 
4. Enter the last four digits of your Social Security Number (xxxx) and Date of Birth (mm/dd/yyyy) as indicated and click Submit. You will be taken to the next sign-up page. 5. Create a [employer] ID. This will be your [employer] login ID and cannot be changed, so think of one that is secure and easy to remember. 6. Create a [employer] password. You can change your password at any time. 7. Enter your Password Reset Question and Answer. This can be used at a later time if you forget your password. 8. Enter your e-mail address. You will receive e-mail notification when new information is available in [address] 9. Click Sign Up. You can now view and download portions of your medical record. 10. Click the Download Summary menu link to download a portable copy of your medical information. If you have questions, please visit the Frequently Asked Questions section of the [employer] website. Remember, [employer] is NOT to be used for urgent needs. For medical emergencies, dial 911. Now available from your iPhone and Android! Please provide this summary of care documentation to your next provider. Your primary care clinician is listed as Lisa Morfin. If you have any questions after today's visit, please call [phone].

## 2018-02-03 DIAGNOSIS — J41.0 SIMPLE CHRONIC BRONCHITIS (HCC): ICD-10-CM

## 2018-02-03 RX ORDER — ALBUTEROL SULFATE 90 UG/1
AEROSOL, METERED RESPIRATORY (INHALATION)
Qty: 3 INHALER | Refills: 3 | Status: SHIPPED | OUTPATIENT
Start: 2018-02-03 | End: 2018-05-30 | Stop reason: ALTCHOICE

## 2018-02-06 NOTE — TELEPHONE ENCOUNTER
----- Message from Pilar Zaldviar sent at 2/6/2018 12:31 PM EST -----  Regarding: Dr. Hallie Crockett  Pt is requesting a call back to discuss his injection. Pt call back [phone].

## 2018-02-08 NOTE — TELEPHONE ENCOUNTER
Attempted to return call to patient again per Dr Little [facility] for patient to call the office.

## 2018-02-08 NOTE — TELEPHONE ENCOUNTER
Spoke with patient, who wants to verify if he should take the insulin basaglar with exenatide. Awaiting response from Dr Son Carpenter to return call to patient.

## 2018-02-09 ENCOUNTER — CLINICAL SUPPORT (OUTPATIENT)
Dept: CARDIOLOGY CLINIC | Age: 63
End: 2018-02-09

## 2018-02-09 DIAGNOSIS — E66.3 OVERWEIGHT: ICD-10-CM

## 2018-02-09 DIAGNOSIS — R07.9 CHEST PAIN, UNSPECIFIED TYPE: Primary | ICD-10-CM

## 2018-02-09 DIAGNOSIS — Z72.0 TOBACCO ABUSE: ICD-10-CM

## 2018-02-09 DIAGNOSIS — I10 ESSENTIAL HYPERTENSION: ICD-10-CM

## 2018-02-09 DIAGNOSIS — R06.02 SOB (SHORTNESS OF BREATH): ICD-10-CM

## 2018-02-09 NOTE — PROGRESS NOTES
See scanned report. Dr. Abhijeet Nguyen ordered study and Dr. Abhijeet Nguyen read study. ID verified per protocol. Explained procedure to patient. Obtaining IV access, radiation exposure, risks and discomforts (for exercise- change to lexiwalk stress). , waiting between injection(s) and obtaining images. All concerns and questions addressed prior to obtaining consent test. Patient developed severe dyspnea during test. At 7 minutes in recovey, patient without symptoms or complaints voiced.

## 2018-02-09 NOTE — TELEPHONE ENCOUNTER
Pt wanted to know if he was ok to take his Byetta with the insulin that he is taking. I relayed this message per .  -it's ok for patient to take Byetta because his insulin is not taken during meals, it's taken once daily. Patient had no further questions.

## 2018-02-14 ENCOUNTER — TELEPHONE (OUTPATIENT)
Dept: CARDIOLOGY CLINIC | Age: 63
End: 2018-02-14

## 2018-02-14 NOTE — TELEPHONE ENCOUNTER
----- Message from Justin Ceballos MD sent at 2/10/2018  8:07 AM EST -----  Regarding: please call patient  Елена - please call patient. Echo 2/9/18 - mild LVH. LVEF 60%. RV normal.     Exercise Cardiolite 2/9/18 - walked 4:36 (7.1 METS). Normal stress EKG and MPi. LVEF 64%    Will have nurse call with normal results. He still needs risk factor control as we discussed before.        Thanks,  [employer]

## 2018-03-01 ENCOUNTER — TELEPHONE (OUTPATIENT)
Dept: FAMILY MEDICINE CLINIC | Age: 63
End: 2018-03-01

## 2018-03-01 NOTE — TELEPHONE ENCOUNTER
----- Message from Aida Herbert sent at 3/1/2018  2:36 PM EST -----  Regarding: Dr. Ly Michelle. Jazz/Telephone  Pt requesting to speak with provider in regards to back pain. Pt stated he did go to pain management specialist Tuesday 02/27/18 and another appt has been scheduled for a shot on Friday 03/23/18. Pt stated, he is in serve pain even pain medication prescribed is not working. Pt inquiring on the best action to take as it is hard for pt to move. Pt requesting to schedule 1 month f/up appt. JUDIT. Best contact number [phone].

## 2018-03-06 ENCOUNTER — HOSPITAL ENCOUNTER (EMERGENCY)
Age: 63
Discharge: HOME | End: 2018-03-06
Attending: STUDENT IN AN ORGANIZED HEALTH CARE EDUCATION/TRAINING PROGRAM
Payer: MEDICAID

## 2018-03-06 VITALS
DIASTOLIC BLOOD PRESSURE: 107 MMHG | WEIGHT: 240 LBS | SYSTOLIC BLOOD PRESSURE: 162 MMHG | HEIGHT: 76 IN | BODY MASS INDEX: 29.22 KG/M2 | RESPIRATION RATE: 15 BRPM | HEART RATE: 93 BPM | OXYGEN SATURATION: 99 % | TEMPERATURE: 97.5 F

## 2018-03-06 DIAGNOSIS — M54.41 CHRONIC LOW BACK PAIN WITH RIGHT-SIDED SCIATICA, UNSPECIFIED BACK PAIN LATERALITY: Primary | ICD-10-CM

## 2018-03-06 DIAGNOSIS — G89.29 CHRONIC LOW BACK PAIN WITH RIGHT-SIDED SCIATICA, UNSPECIFIED BACK PAIN LATERALITY: Primary | ICD-10-CM

## 2018-03-06 PROCEDURE — 99282 EMERGENCY DEPT VISIT SF MDM: CPT

## 2018-03-06 PROCEDURE — 96372 THER/PROPH/DIAG INJ SC/IM: CPT

## 2018-03-06 PROCEDURE — 74011250636 HC RX REV CODE- 250/636: Performed by: NURSE PRACTITIONER

## 2018-03-06 RX ORDER — PREDNISONE 50 MG/1
50 TABLET ORAL DAILY
Qty: 5 TAB | Refills: 0 | Status: SHIPPED | OUTPATIENT
Start: 2018-03-06 | End: 2018-03-11

## 2018-03-06 RX ORDER — PREDNISONE 50 MG/1
50 TABLET ORAL DAILY
Qty: 5 TAB | Refills: 0 | Status: SHIPPED | OUTPATIENT
Start: 2018-03-06 | End: 2018-03-06

## 2018-03-06 RX ADMIN — METHYLPREDNISOLONE SODIUM SUCCINATE 125 MG: 125 INJECTION, POWDER, FOR SOLUTION INTRAMUSCULAR; INTRAVENOUS at 13:47

## 2018-03-06 NOTE — DISCHARGE INSTRUCTIONS
Please remember to watch your blood sugars as they can elevate when taking steroids. Please follow up with your PCP. Back Pain: Care Instructions  Your Care Instructions    Back pain has many possible causes. It is often related to problems with muscles and ligaments of the back. It may also be related to problems with the nerves, discs, or bones of the back. Moving, lifting, standing, sitting, or sleeping in an awkward way can strain the back. Sometimes you don't notice the injury until later. Arthritis is another common cause of back pain. Although it may hurt a lot, back pain usually improves on its own within several weeks. Most people recover in 12 weeks or less. Using good home treatment and being careful not to stress your back can help you feel better sooner. Follow-up care is a key part of your treatment and safety. Be sure to make and go to all appointments, and call your doctor if you are having problems. It's also a good idea to know your test results and keep a list of the medicines you take. How can you care for yourself at home? · Sit or lie in positions that are most comfortable and reduce your pain. Try one of these positions when you lie down:  ¨ Lie on your back with your knees bent and supported by large pillows. ¨ Lie on the floor with your legs on the seat of a sofa or chair. Vi Basset on your side with your knees and hips bent and a pillow between your legs. ¨ Lie on your stomach if it does not make pain worse. · Do not sit up in bed, and avoid soft couches and twisted positions. Bed rest can help relieve pain at first, but it delays healing. Avoid bed rest after the first day of back pain. · Change positions every 30 minutes. If you must sit for long periods of time, take breaks from sitting. Get up and walk around, or lie in a comfortable position. · Try using a heating pad on a low or medium setting for 15 to 20 minutes every 2 or 3 hours.  Try a warm shower in place of one session with the heating pad. · You can also try an ice pack for 10 to 15 minutes every 2 to 3 hours. Put a thin cloth between the ice pack and your skin. · Take pain medicines exactly as directed. ¨ If the doctor gave you a prescription medicine for pain, take it as prescribed. ¨ If you are not taking a prescription pain medicine, ask your doctor if you can take an over-the-counter medicine. · Take short walks several times a day. You can start with 5 to 10 minutes, 3 or 4 times a day, and work up to longer walks. Walk on level surfaces and avoid hills and stairs until your back is better. · Return to work and other activities as soon as you can. Continued rest without activity is usually not good for your back. · To prevent future back pain, do exercises to stretch and strengthen your back and stomach. Learn how to use good posture, safe lifting techniques, and proper body mechanics. When should you call for help? Call your doctor now or seek immediate medical care if:  ? · You have new or worsening numbness in your legs. ? · You have new or worsening weakness in your legs. (This could make it hard to stand up.)   ? · You lose control of your bladder or bowels. ? Watch closely for changes in your health, and be sure to contact your doctor if:  ? · Your pain gets worse. ? · You are not getting better after 2 weeks. Where can you learn more? Go to http://jaz-bhavna.info/. Enter D960 in the search box to learn more about \"Back Pain: Care Instructions. \"  Current as of: March 21, 2017  Content Version: 11.4  © 1701-0800 [employer]. Care instructions adapted under license by [employer] (which disclaims liability or warranty for this information). If you have questions about a medical condition or this instruction, always ask your healthcare professional. [address] any warranty or liability for your use of this information.

## 2018-03-06 NOTE — ED NOTES
Patient discharged by NP. Pt given the opportunity to ask questions, verbalized understanding of teaching.

## 2018-03-06 NOTE — ED TRIAGE NOTES
Pt. C/o pain to right lower back for the past few days that is getting worse. Hx back problems. Takes oxycodone for pain with some effect.

## 2018-03-07 ENCOUNTER — TELEPHONE (OUTPATIENT)
Dept: FAMILY MEDICINE CLINIC | Age: 63
End: 2018-03-07

## 2018-03-07 NOTE — TELEPHONE ENCOUNTER
Returned patient call. Patient wanted to make sure it was safe to take his new prescription of prednisone prescribed by the ER. Informed patient per Dr Rees Her that it is safe to take. Patient also was informed that his Blood Sugar would elevate on that medication also. Patient also scheduled an ED follow-up. Patient verbalized understanding of appointment time and date for 3/16/18 with Dr Rees Her.

## 2018-03-07 NOTE — TELEPHONE ENCOUNTER
Patient called to speak with the doctor as he went to the ER was given prednisone 50 mg, 5 tabs, and he got a shot of this 125 mg. This morning his blood sugar is elevated 216, fasting number. He asked for another MRI at the ER and the said his PCP would need too order. --[phone]    Asking for a call today if possible from physician only.

## 2018-03-16 ENCOUNTER — OFFICE VISIT (OUTPATIENT)
Dept: FAMILY MEDICINE CLINIC | Age: 63
End: 2018-03-16

## 2018-03-16 VITALS
TEMPERATURE: 98.4 F | BODY MASS INDEX: 29.03 KG/M2 | SYSTOLIC BLOOD PRESSURE: 117 MMHG | DIASTOLIC BLOOD PRESSURE: 76 MMHG | OXYGEN SATURATION: 99 % | HEIGHT: 76 IN | WEIGHT: 238.4 LBS | RESPIRATION RATE: 18 BRPM | HEART RATE: 97 BPM

## 2018-03-16 DIAGNOSIS — G89.29 CHRONIC LOW BACK PAIN, UNSPECIFIED BACK PAIN LATERALITY, WITH SCIATICA PRESENCE UNSPECIFIED: ICD-10-CM

## 2018-03-16 DIAGNOSIS — M54.5 CHRONIC LOW BACK PAIN, UNSPECIFIED BACK PAIN LATERALITY, WITH SCIATICA PRESENCE UNSPECIFIED: ICD-10-CM

## 2018-03-16 RX ORDER — EXENATIDE 2 MG/.65ML
INJECTION, SUSPENSION, EXTENDED RELEASE SUBCUTANEOUS
COMMUNITY
Start: 2018-03-03 | End: 2018-09-18 | Stop reason: SDDI

## 2018-03-16 NOTE — MR AVS SNAPSHOT
[address] 
[phone] Patient: Rayo Cortes MRN: [id number] Visit Information Date & Time Provider Department Dept. Phone Encounter #  
 3/16/2018  3:45 PM Ashlyn Pal, [facility] [phone] [id number] Follow-up Instructions Return in about 4 weeks (around 4/13/2018) for Blood sugar check. Your Appointments 5/8/2018 11:00 AM  
ESTABLISHED PATIENT with Roger Louise MD  
[facility] ([address]) Appt Note: 3 mo fu  
 354 [facility] 600 1007 [facility]  
54 [facility] [id number] [address] Upcoming Health Maintenance Date Due  
 EYE EXAM RETINAL OR DILATED Q1 4/24/2018 HEMOGLOBIN A1C Q6M 7/19/2018 FOOT EXAM Q1 10/5/2018 MICROALBUMIN Q1 10/5/2018 LIPID PANEL Q1 10/5/2018 DTaP/Tdap/Td series (2 - Td) 9/15/2022 COLONOSCOPY 4/25/2026 Allergies as of 3/16/2018  Review Complete On: 3/16/2018 By: Loyd Kelly No Known Allergies Current Immunizations  Reviewed on 2/27/2017 No immunizations on file. Not reviewed this visit You Were Diagnosed With   
  
 Codes Comments Uncontrolled type 2 diabetes mellitus with stage 3 chronic kidney disease, with long-term current use of insulin (HCC)    -  Primary ICD-10-CM: E11.22, E11.65, N18.3, Z79.4 ICD-9-CM: 250.52, 585.3, V58.67 Chronic low back pain, unspecified back pain laterality, with sciatica presence unspecified     ICD-10-CM: M54.5, G89.29 ICD-9-CM: 724.2, 338.29 Vitals BP Pulse Temp Resp Height(growth percentile) Weight(growth percentile) 117/76 (BP 1 Location: Right arm, BP Patient Position: Sitting) 97 98.4 °F (36.9 °C) (Oral) 18 6' 4\" (1.93 m) 238 lb 6.4 oz (108.1 kg) SpO2 BMI Smoking Status 99% 29.02 kg/m2 Current Every Day Smoker BMI and BSA Data Body Mass Index Body Surface Area  
 29.02 kg/m 2 2.41 m 2 Preferred Pharmacy Pharmacy Name Phone Georgi Mchugh [address] [phone] Your Updated Medication List  
  
   
This list is accurate as of 3/16/18  4:26 PM.  Always use your most recent med list.  
  
  
  
  
 BYDUREON 2 mg/0.65 mL Pnij Generic drug:  exenatide microspheres  
  
 gabapentin 600 mg tablet Commonly known as:  NEURONTIN Take  by mouth two (2) times a day. glipiZIDE 10 mg tablet Commonly known as:  GLUCOTROL  
TAKE ONE TABLET BY MOUTH TWICE DAILY  
  
 glucose blood VI test strips strip Commonly known as:  TRUE METRIX GLUCOSE TEST STRIP  
USE ONE STRIP TO CHECK GLUCOSE THREE TIMES DAILY  
  
 insulin glargine 100 unit/mL (3 mL) Inpn Commonly known as:  BASAGLAR KWIKPEN U-100 INSULIN  
33 units at night Insulin Needles (Disposable) 31 gauge x 1/4\" Ndle Commonly known as:  ULTICARE PEN NEEDLE  
USE ONE PEN NEEDLE DAILY Lancets Misc Check blood glucose daily  
  
 lisinopril-hydroCHLOROthiazide 20-12.5 mg per tablet Commonly known as:  PRINZIDE, ZESTORETIC  
TAKE ONE TABLET BY MOUTH ONCE DAILY  
  
 metFORMIN 1,000 mg tablet Commonly known as:  GLUCOPHAGE  
TAKE ONE TABLET BY MOUTH TWICE DAILY WITH MEALS  
  
 oxyCODONE-acetaminophen 5-325 mg per tablet Commonly known as:  PERCOCET  
  
 SPIRIVA RESPIMAT 1.25 mcg/actuation inhaler Generic drug:  tiotropium bromide INHALE TWO PUFFS BY MOUTH ONCE DAILY  
  
 TRUE METRIX GLUCOSE METER Misc Generic drug:  Blood-Glucose Meter USE TO CHECK GLUCOSE ONCE DAILY VENTOLIN HFA 90 mcg/actuation inhaler Generic drug:  albuterol INHALE TWO PUFFS BY MOUTH EVERY 4 HOURS AS NEEDED FOR WHEEZING Follow-up Instructions Return in about 4 weeks (around 4/13/2018) for Blood sugar check. Introducing [facility]! Jose Delgadillo introduces [facility] patient portal. Now you can access parts of your medical record, email your doctor's office, and request medication refills online. 1. In your internet browser, go to [email or web address] 2. Click on the First Time User? Click Here link in the Sign In box. You will see the New Member Sign Up page. 3. Enter your [facility] Access Code exactly as it appears below. You will not need to use this code after youve completed the sign-up process. If you do not sign up before the expiration date, you must request a new code. · [facility] Access Code: [email or web address] Expires: 4/19/2018  4:00 PM 
 
4. Enter the last four digits of your Social Security Number (xxxx) and Date of Birth (mm/dd/yyyy) as indicated and click Submit. You will be taken to the next sign-up page. 5. Create a [facility] ID. This will be your [facility] login ID and cannot be changed, so think of one that is secure and easy to remember. 6. Create a [facility] password. You can change your password at any time. 7. Enter your Password Reset Question and Answer. This can be used at a later time if you forget your password. 8. Enter your e-mail address. You will receive e-mail notification when new information is available in [address] 9. Click Sign Up. You can now view and download portions of your medical record. 10. Click the Download Summary menu link to download a portable copy of your medical information. If you have questions, please visit the Frequently Asked Questions section of the [facility] website. Remember, [facility] is NOT to be used for urgent needs. For medical emergencies, dial 911. Now available from your iPhone and Android! Please provide this summary of care documentation to your next provider. Your primary care clinician is listed as Tigist Hsu. If you have any questions after today's visit, please call [phone].

## 2018-03-16 NOTE — PROGRESS NOTES
Chief Complaint   Patient presents with   [facility] ED Follow-up     back pain     1. Have you been to the ER, urgent care clinic since your last visit? Hospitalized since your last visit? Yes When: 3/6/18 Where: [facility] Reason for visit: Back pain    2. Have you seen or consulted any other health care providers outside of the [address] [place] [address] since your last visit? Include any pap smears or colon screening. No   Patient states back pain is feeling a little better but still hurts.
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
## 2018-04-01 DIAGNOSIS — J41.0 SIMPLE CHRONIC BRONCHITIS (HCC): ICD-10-CM

## 2018-04-02 RX ORDER — BIOTIN 1 MG
TABLET ORAL
Qty: 50 STRIP | Refills: 21 | Status: SHIPPED | OUTPATIENT
Start: 2018-04-02

## 2018-04-02 RX ORDER — TIOTROPIUM BROMIDE INHALATION SPRAY 1.56 UG/1
SPRAY, METERED RESPIRATORY (INHALATION)
Qty: 1 INHALER | Refills: 3 | Status: SHIPPED | OUTPATIENT
Start: 2018-04-02 | End: 2018-04-15 | Stop reason: ALTCHOICE

## 2018-04-02 RX ORDER — BIOTIN 1 MG
TABLET ORAL
Qty: 50 STRIP | Refills: 7 | Status: SHIPPED | OUTPATIENT
Start: 2018-04-02

## 2018-04-02 RX ORDER — INSULIN GLARGINE 100 [IU]/ML
INJECTION, SOLUTION SUBCUTANEOUS
Qty: 15 PEN | Refills: 3 | Status: SHIPPED | OUTPATIENT
Start: 2018-04-02 | End: 2019-04-09 | Stop reason: SDUPTHER

## 2018-04-11 ENCOUNTER — TELEPHONE (OUTPATIENT)
Dept: FAMILY MEDICINE CLINIC | Age: 63
End: 2018-04-11

## 2018-04-11 NOTE — TELEPHONE ENCOUNTER
Called pharmacy and spoke with pharmacist per Dr Bandar Khoury to switch ventolin HFA to albuterol. Pharmacist verbalized understanding.

## 2018-04-11 NOTE — TELEPHONE ENCOUNTER
Per fax from pharmacy, patient's insurance will not pay for the ventolin HFA inhaler. Can they switch to the  Albuterol inhaler? Please advise.

## 2018-04-15 NOTE — TELEPHONE ENCOUNTER
Ordered Spiriva with Handihaler per insurance formulary. Will have patient called with info.     Abiodun Gomez MD

## 2018-04-18 ENCOUNTER — OFFICE VISIT (OUTPATIENT)
Dept: FAMILY MEDICINE CLINIC | Age: 63
End: 2018-04-18

## 2018-04-18 VITALS
SYSTOLIC BLOOD PRESSURE: 132 MMHG | BODY MASS INDEX: 28.91 KG/M2 | HEIGHT: 76 IN | RESPIRATION RATE: 17 BRPM | TEMPERATURE: 97.6 F | DIASTOLIC BLOOD PRESSURE: 79 MMHG | OXYGEN SATURATION: 96 % | HEART RATE: 91 BPM | WEIGHT: 237.4 LBS

## 2018-04-18 DIAGNOSIS — G89.29 CHRONIC BILATERAL LOW BACK PAIN, WITH SCIATICA PRESENCE UNSPECIFIED: ICD-10-CM

## 2018-04-18 DIAGNOSIS — M54.5 CHRONIC BILATERAL LOW BACK PAIN, WITH SCIATICA PRESENCE UNSPECIFIED: ICD-10-CM

## 2018-04-18 DIAGNOSIS — J41.0 SIMPLE CHRONIC BRONCHITIS (HCC): ICD-10-CM

## 2018-04-18 DIAGNOSIS — I10 ESSENTIAL HYPERTENSION: ICD-10-CM

## 2018-04-18 DIAGNOSIS — Z72.0 TOBACCO ABUSE: ICD-10-CM

## 2018-04-18 DIAGNOSIS — E66.3 OVERWEIGHT: ICD-10-CM

## 2018-04-18 DIAGNOSIS — R80.1 PERSISTENT PROTEINURIA: ICD-10-CM

## 2018-04-18 PROBLEM — E11.40 TYPE 2 DIABETES MELLITUS WITH DIABETIC NEUROPATHY (HCC): Status: ACTIVE | Noted: 2018-04-18
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
## 2018-04-18 NOTE — PROGRESS NOTES
Chief Complaint   Patient presents with    Diabetes     follow up     1. Have you been to the ER, urgent care clinic since your last visit? Hospitalized since your last visit? No    2. Have you seen or consulted any other health care providers outside of the [address] [place] [address] since your last visit? Include any pap smears or colon screening.  No

## 2018-04-18 NOTE — MR AVS SNAPSHOT
[address] 
[phone] Patient: Zara James MRN: [id number] HIN:[id number] Visit Information Date & Time Provider Department Dept. Phone Encounter #  
 4/18/2018  1:30 PM Andra Marroquin, [address] [place] [address] [phone] [id number] Follow-up Instructions Return in about 3 months (around 7/18/2018) for Diabetes follow up. Your Appointments 5/3/2018  2:00 PM  
ESTABLISHED PATIENT with Dany Hall MD  
[facility] ([facility]) Appt Note: 3 mo fu; 4/4/18  lm for pt of appt time and day change from 5/8/18  sll [address] [facility] 600 1007 [facility]  
[address] [facility] [address] [address] Upcoming Health Maintenance Date Due  
 EYE EXAM RETINAL OR DILATED Q1 4/24/2018 HEMOGLOBIN A1C Q6M 7/19/2018 FOOT EXAM Q1 10/5/2018 MICROALBUMIN Q1 10/5/2018 LIPID PANEL Q1 10/5/2018 DTaP/Tdap/Td series (2 - Td) 9/15/2022 COLONOSCOPY 4/25/2026 Allergies as of 4/18/2018  Review Complete On: 4/18/2018 By: Vladimir Godoy LPN No Known Allergies Current Immunizations  Reviewed on 2/27/2017 No immunizations on file. Not reviewed this visit You Were Diagnosed With   
  
 Codes Comments Uncontrolled type 2 diabetes mellitus with stage 3 chronic kidney disease, with long-term current use of insulin (HCC)    -  Primary ICD-10-CM: E11.22, E11.65, N18.3, Z79.4 ICD-9-CM: 250.52, 585.3, V58.67 Persistent proteinuria     ICD-10-CM: R80.1 ICD-9-CM: 791.0 Essential hypertension     ICD-10-CM: I10 
ICD-9-CM: 401.9 Overweight     ICD-10-CM: X95.9 ICD-9-CM: 278.02 Chronic bilateral low back pain, with sciatica presence unspecified     ICD-10-CM: M54.5, G89.29 ICD-9-CM: 724.2, 338.29 Simple chronic bronchitis (HCC)     ICD-10-CM: J41.0 ICD-9-CM: 491.0 Tobacco abuse     ICD-10-CM: Z72.0 ICD-9-CM: 305.1 Vitals BP Pulse Temp Resp Height(growth percentile) Weight(growth percentile) 141/81 95 97.6 °F (36.4 °C) (Oral) 17 6' 4\" (1.93 m) 237 lb 6.4 oz (107.7 kg) SpO2 BMI Smoking Status 96% 28.9 kg/m2 Current Every Day Smoker Vitals History BMI and BSA Data Body Mass Index Body Surface Area  
 28.9 kg/m 2 2.4 m 2 Preferred Pharmacy Pharmacy Name Phone [address] [place] [phone] Your Updated Medication List  
  
   
This list is accurate as of 4/18/18  2:42 PM.  Always use your most recent med list.  
  
  
  
  
 BASAGLSYLVIE GARRETTIKPEN U-100 INSULIN 100 unit/mL (3 mL) Inpn Generic drug:  insulin glargine INJECT 33 UNITS SUBCUTANEOUSLY ONCE DAILY AT  NIGHT BYDUREON 2 mg/0.65 mL Pnij Generic drug:  exenatide microspheres  
  
 gabapentin 600 mg tablet Commonly known as:  NEURONTIN Take  by mouth two (2) times a day. glipiZIDE 10 mg tablet Commonly known as:  GLUCOTROL  
TAKE ONE TABLET BY MOUTH TWICE DAILY Insulin Needles (Disposable) 31 gauge x 1/4\" Ndle Commonly known as:  ULTICARE PEN NEEDLE  
USE ONE PEN NEEDLE DAILY Lancets Misc Check blood glucose daily  
  
 lisinopril-hydroCHLOROthiazide 20-12.5 mg per tablet Commonly known as:  PRINZIDE, ZESTORETIC  
TAKE ONE TABLET BY MOUTH ONCE DAILY  
  
 metFORMIN 1,000 mg tablet Commonly known as:  GLUCOPHAGE  
TAKE ONE TABLET BY MOUTH TWICE DAILY WITH MEALS  
  
 oxyCODONE-acetaminophen 5-325 mg per tablet Commonly known as:  PERCOCET  
  
 tiotropium 18 mcg inhalation capsule Commonly known as:  [address] Take 1 Cap by inhalation daily. Indications: BRONCHOSPASM PREVENTION WITH COPD  
  
 TRUE METRIX GLUCOSE METER Misc Generic drug:  Blood-Glucose Meter USE TO CHECK GLUCOSE ONCE DAILY * TRUETEST TEST STRIPS strip Generic drug:  glucose blood VI test strips CHECK BLOOD GLUCOSE FASTING ONCE DAILY * TRUETEST TEST STRIPS strip Generic drug:  glucose blood VI test strips USE AS DIRECTED TO TEST BLOOD SUGAR ONCE DAILY VENTOLIN HFA 90 mcg/actuation inhaler Generic drug:  albuterol INHALE TWO PUFFS BY MOUTH EVERY 4 HOURS AS NEEDED FOR WHEEZING  
  
 * Notice: This list has 2 medication(s) that are the same as other medications prescribed for you. Read the directions carefully, and ask your doctor or other care provider to review them with you. We Performed the Following HEMOGLOBIN A1C WITH EAG [49024 CPT(R)] METABOLIC PANEL, BASIC [32031 CPT(R)] Follow-up Instructions Return in about 3 months (around 7/18/2018) for Diabetes follow up. To-Do List   
 04/18/2018 Imaging:  XR SPINE LUMB MIN 4 V Patient Instructions Learning About Benefits From Quitting Smoking How does quitting smoking make you healthier? If you're thinking about quitting smoking, you may have a few reasons to be smoke-free. Your health may be one of them. · When you quit smoking, you lower your risks for cancer, lung disease, heart attack, stroke, blood vessel disease, and blindness from macular degeneration. · When you're smoke-free, you get sick less often, and you heal faster. You are less likely to get colds, flu, bronchitis, and pneumonia. · As a nonsmoker, you may find that your mood is better and you are less stressed. When and how will you feel healthier? Quitting has real health benefits that start from day 1 of being smoke-free. And the longer you stay smoke-free, the healthier you get and the better you feel. The first hours · After just 20 minutes, your blood pressure and heart rate go down. That means there's less stress on your heart and blood vessels.  
· Within 12 hours, the level of carbon monoxide in your blood drops back to normal. That makes room for more oxygen. With more oxygen in your body, you may notice that you have more energy than when you smoked. After 2 weeks · Your lungs start to work better. · Your risk of heart attack starts to drop. After 1 month · When your lungs are clear, you cough less and breathe deeper, so it's easier to be active. · Your sense of taste and smell return. That means you can enjoy food more than you have since you started smoking. Over the years · After 1 year, your risk of heart disease is half what it would be if you kept smoking. · After 5 years, your risk of stroke starts to shrink. Within a few years after that, it's about the same as if you'd never smoked. · After 10 years, your risk of dying from lung cancer is cut by about half. And your risk for many other types of cancer is lower too. How would quitting help others in your life? When you quit smoking, you improve the health of everyone who now breathes in your smoke. · Their heart, lung, and cancer risks drop, much like yours. · They are sick less. For babies and small children, living smoke-free means they're less likely to have ear infections, pneumonia, and bronchitis. · If you're a woman who is or will be pregnant someday, quitting smoking means a healthier . · Children who are close to you are less likely to become adult smokers. Where can you learn more? Go to http://jaz-bhavna.info/. Enter [phone] in the search box to learn more about \"Learning About Benefits From Quitting Smoking. \" Current as of: 2017 Content Version: 11.4 © 3989-6126 Healthwise, Incorporated. Care instructions adapted under license by [employer] (which disclaims liability or warranty for this information).  If you have questions about a medical condition or this instruction, always ask your healthcare professional. [address]  any warranty or liability for your use of this information. Introducing [facility]! [facility] [place] [facility] introduces [employer] patient portal. Now you can access parts of your medical record, email your doctor's office, and request medication refills online. 1. In your internet browser, go to [email or web address]. [employer] [facility] [employer] 2. Click on the First Time User? Click Here link in the Sign In box. You will see the New Member Sign Up page. 3. Enter your [employer] Access Code exactly as it appears below. You will not need to use this code after youve completed the sign-up process. If you do not sign up before the expiration date, you must request a new code. · [employer] Access Code: [email or web address] Expires: 4/19/2018  4:00 PM 
 
4. Enter the last four digits of your Social Security Number (xxxx) and Date of Birth (mm/dd/yyyy) as indicated and click Submit. You will be taken to the next sign-up page. 5. Create a [employer] ID. This will be your [employer] login ID and cannot be changed, so think of one that is secure and easy to remember. 6. Create a [employer] password. You can change your password at any time. 7. Enter your Password Reset Question and Answer. This can be used at a later time if you forget your password. 8. Enter your e-mail address. You will receive e-mail notification when new information is available in [address] 9. Click Sign Up. You can now view and download portions of your medical record. 10. Click the Download Summary menu link to download a portable copy of your medical information. If you have questions, please visit the Frequently Asked Questions section of the [employer] website. Remember, [employer] is NOT to be used for urgent needs. For medical emergencies, dial 911. Now available from your iPhone and Android! Please provide this summary of care documentation to your next provider. Your primary care clinician is listed as Yue Fee.  If you have any questions after today's visit, please call [phone].

## 2018-04-18 NOTE — PATIENT INSTRUCTIONS

## 2018-04-19 LAB
BUN SERPL-MCNC: 11 MG/DL (ref 8–27)
BUN/CREAT SERPL: 12 (ref 10–24)
CALCIUM SERPL-MCNC: 11.3 MG/DL (ref 8.6–10.2)
CHLORIDE SERPL-SCNC: 93 MMOL/L (ref 96–106)
CO2 SERPL-SCNC: 25 MMOL/L (ref 18–29)
CREAT SERPL-MCNC: 0.9 MG/DL (ref 0.76–1.27)
EST. AVERAGE GLUCOSE BLD GHB EST-MCNC: 180 MG/DL
GFR SERPLBLD CREATININE-BSD FMLA CKD-EPI: 106 ML/MIN/1.73
GFR SERPLBLD CREATININE-BSD FMLA CKD-EPI: 92 ML/MIN/1.73
GLUCOSE SERPL-MCNC: 233 MG/DL (ref 65–99)
HBA1C MFR BLD: 7.9 % (ref 4.8–5.6)
INTERPRETATION: NORMAL
Lab: NORMAL
POTASSIUM SERPL-SCNC: 4.5 MMOL/L (ref 3.5–5.2)
SODIUM SERPL-SCNC: 138 MMOL/L (ref 134–144)

## 2018-04-20 ENCOUNTER — TELEPHONE (OUTPATIENT)
Dept: FAMILY MEDICINE CLINIC | Age: 63
End: 2018-04-20

## 2018-04-20 NOTE — PROGRESS NOTES
BG high as usual  But best A1c we have achieved yet!   Will call patient with results  Will follow elevated Ca for improvement at next visit

## 2018-04-20 NOTE — TELEPHONE ENCOUNTER
Patient name identified on VM  Left message informing him of much improved A1c to 7.9%! No change in medications. Follow up in 3 months as planned. Instructed to call office back if needed.      Johnny Keyes MD

## 2018-04-30 RX ORDER — CALCIUM CITRATE/VITAMIN D3 200MG-6.25
TABLET ORAL
Qty: 50 STRIP | Refills: 1 | Status: SHIPPED | OUTPATIENT
Start: 2018-04-30 | End: 2018-08-01 | Stop reason: SDUPTHER

## 2018-05-01 ENCOUNTER — HOSPITAL ENCOUNTER (OUTPATIENT)
Dept: GENERAL RADIOLOGY | Age: 63
Discharge: HOME | End: 2018-05-01
Payer: MEDICAID

## 2018-05-01 DIAGNOSIS — G89.4 CHRONIC PAIN SYNDROME: ICD-10-CM

## 2018-05-01 PROCEDURE — 72110 X-RAY EXAM L-2 SPINE 4/>VWS: CPT

## 2018-05-02 NOTE — PROGRESS NOTES
Chief Complaint   Patient presents with    Follow-up     diabetes running high 87 year-old female with PMH of HTN, CAD (stent in 2004), current smoker, HLD who presented with back pain, abdominal pain. She had recently been discharged from [facility] on 4/9/18 after a mechanical fall, found to have chronic mild compression fracture of L5 that had been stable since April 2015, small disc bulges in the lumbar spine, mild spinal canal stenosis at L1-L2 and L4-L5.     This time, she was sent to the hospital by visiting nurse for nonresolving back pain. She was admitted to medicine for further management. MRI lumbar spine showed new finding of acute T12 compression fracture.        1. Back pain.  Plan: - MRI spine has acute T12 compression fracture  - continue lidocaine patch, tramadol, gabapentin,  - PT recommended outpatient PT  - no surgical intervention as per orthopedics  -pending guardinship.      2: Failure to thrive in adult.  Plan: - no infectious etiology noted  - continue diet  - evaluated by psychiatry and deemed not to have capacity to make medical decisions for herself  - pending social work follow up.     3:HTN (hypertension).  Plan: - continue metoprolol, hydralazine.     4.  AD (coronary artery disease).  Plan: - continue aspirin, statin.     5: Hyperlipidemia.  Plan: - continue simvastatin.     6 .Smoking greater than 25 pack years. Plan: - nicotine patch.    7.Need for prophylactic measure.  Plan: IMPROVE VTE Individual Risk Assessment          RISK                                                          Points  [  ] Previous VTE                                                3  [  ] Thrombophilia                                             2  [  ] Lower limb paralysis                                   2        (unable to hold up >15 seconds)    [  ] Current Cancer                                             2         (within 6 months)  [ x ] Immobilization > 24 hrs                              1  [  ] ICU/CCU stay > 24 hours                             1  [x  ] Age > 60                                                         1    IMPROVE VTE Score: 2, lovenox for dvt ppx.     Dc planning - ct chest 3 mo; Colonoscopy in  future - to f/u as outpatient

## 2018-05-03 ENCOUNTER — OFFICE VISIT (OUTPATIENT)
Dept: CARDIOLOGY CLINIC | Age: 63
End: 2018-05-03

## 2018-05-03 VITALS
BODY MASS INDEX: 28.76 KG/M2 | WEIGHT: 236.2 LBS | OXYGEN SATURATION: 98 % | DIASTOLIC BLOOD PRESSURE: 76 MMHG | HEIGHT: 76 IN | SYSTOLIC BLOOD PRESSURE: 110 MMHG | HEART RATE: 84 BPM | RESPIRATION RATE: 16 BRPM

## 2018-05-03 DIAGNOSIS — I10 ESSENTIAL HYPERTENSION: ICD-10-CM

## 2018-05-03 DIAGNOSIS — R07.82 INTERCOSTAL PAIN: Primary | ICD-10-CM

## 2018-05-03 DIAGNOSIS — J41.0 SIMPLE CHRONIC BRONCHITIS (HCC): ICD-10-CM

## 2018-05-03 NOTE — PROGRESS NOTES
Chief Complaint   Patient presents with    Follow-up     HTN     1. Have you been to the ER, urgent care clinic since your last visit? Hospitalized since your last visit? Yes. [facility] on 3/6/18 for Chronic low back pain with right-sided sciatica, unspecified back pain. 2. Have you seen or consulted any other health care providers outside of the [address] [place] [address] since your last visit? Include any pap smears or colon screening.  No    Visit Vitals    /76 (BP 1 Location: Right arm, BP Patient Position: Sitting)    Pulse 84    Resp 16    Ht 6' 4\" (1.93 m)    Wt 236 lb 3.2 oz (107.1 kg)    SpO2 98%    BMI 28.75 kg/m2
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
## 2018-05-16 DIAGNOSIS — J41.0 SIMPLE CHRONIC BRONCHITIS (HCC): ICD-10-CM

## 2018-05-16 NOTE — TELEPHONE ENCOUNTER
Patient called to say the pharmacy has 3 open refills on this inhaler but they need a prior authorization before filling. Contact the pharmacy.

## 2018-05-17 RX ORDER — ALBUTEROL SULFATE 90 UG/1
AEROSOL, METERED RESPIRATORY (INHALATION)
Refills: 11 | OUTPATIENT
Start: 2018-05-17

## 2018-05-30 RX ORDER — ALBUTEROL SULFATE 90 UG/1
2 AEROSOL, METERED RESPIRATORY (INHALATION)
Qty: 1 INHALER | Refills: 3 | Status: SHIPPED | OUTPATIENT
Start: 2018-05-30 | End: 2018-07-16 | Stop reason: SDUPTHER

## 2018-05-30 NOTE — TELEPHONE ENCOUNTER
Called and spoke with patient to inform him that the he don't darlin the ventolin per Dr Igor Vila since she have him on proair. Patient verbalized understanding and stated that his inhalers both the proair and spiriva isn't helping him. Patient requested for an appointment to see Dr Igor Vila. Informed patient that I will see if it's ok to put him on Dr Igor Vila schedule for Marcin Diana 3. Patient verbalized understanding.

## 2018-06-03 ENCOUNTER — OFFICE VISIT (OUTPATIENT)
Dept: FAMILY MEDICINE CLINIC | Age: 63
End: 2018-06-03

## 2018-06-03 VITALS
OXYGEN SATURATION: 95 % | SYSTOLIC BLOOD PRESSURE: 147 MMHG | WEIGHT: 233 LBS | RESPIRATION RATE: 18 BRPM | BODY MASS INDEX: 28.37 KG/M2 | HEART RATE: 83 BPM | DIASTOLIC BLOOD PRESSURE: 84 MMHG | HEIGHT: 76 IN | TEMPERATURE: 97.9 F

## 2018-06-03 DIAGNOSIS — J41.0 SIMPLE CHRONIC BRONCHITIS (HCC): ICD-10-CM

## 2018-06-03 DIAGNOSIS — J18.9 COMMUNITY ACQUIRED PNEUMONIA OF LEFT LOWER LOBE OF LUNG: ICD-10-CM

## 2018-06-03 DIAGNOSIS — J44.1 COPD WITH EXACERBATION (HCC): Primary | ICD-10-CM

## 2018-06-03 DIAGNOSIS — R05.9 COUGH: ICD-10-CM

## 2018-06-03 RX ORDER — AZITHROMYCIN 250 MG/1
TABLET, FILM COATED ORAL
Qty: 6 TAB | Refills: 0 | Status: SHIPPED | OUTPATIENT
Start: 2018-06-03 | End: 2018-06-08

## 2018-06-03 RX ORDER — PREDNISONE 20 MG/1
40 TABLET ORAL
Qty: 10 TAB | Refills: 0 | Status: SHIPPED | OUTPATIENT
Start: 2018-06-03 | End: 2018-07-26 | Stop reason: SDUPTHER

## 2018-06-03 RX ORDER — FLUTICASONE PROPIONATE AND SALMETEROL 250; 50 UG/1; UG/1
1 POWDER RESPIRATORY (INHALATION) EVERY 12 HOURS
Qty: 1 INHALER | Refills: 3 | Status: SHIPPED | OUTPATIENT
Start: 2018-06-03 | End: 2018-09-18 | Stop reason: ALTCHOICE

## 2018-06-03 NOTE — Clinical Note
Jose Martin Stout,  Can you call  Melissa Chandler some time this week to see how is is doing since starting the steroids and antibiotics? Thanks!  CLINTON

## 2018-06-03 NOTE — PATIENT INSTRUCTIONS
Fluticasone/Salmeterol (By breathing)   Fluticasone Propionate (luui-NDS-o-sone USLO-bub-em-jenna), Salmeterol Xinafoate (alexi-ME-ter-ol lop-DTO-xw-ate)  Treats and prevents symptoms of asthma and chronic obstructive pulmonary disease (COPD). This medicine contains a corticosteroid. Brand Name(s): Advair Diskus 100/50, Advair Diskus 250/50, Advair Diskus 500/50, Advair /21, Advair /21, Advair HFA 45/21   There may be other brand names for this medicine. When This Medicine Should Not Be Used: This medicine is not right for everyone. Do not use it if you had an allergic reaction to salmeterol, fluticasone, or milk proteins. Do not use this medicine to treat an asthma attack or a COPD flare-up. How to Use This Medicine:   Liquid Under Pressure, Powder Under Pressure, Disk  · Take your medicine as directed. Your dose may need to be changed several times to find what works best for you. Never use more medicine than your doctor prescribed. · This medicine should come with a Medication Guide. Ask your pharmacist for a copy if you do not have one. · Advair Diskus®:   ¨ The powder medicine is held in a foil blister package inside the Diskus® inhaler. The Diskus® pokes a hole in each blister one at a time when you push the lever. ¨ Do not use a spacer with the Diskus®. ¨ Keep the Diskus® inhaler closed when you are not using it. Keep the inhaler dry at all times. Do not blow into it. ¨ Before you inhale your dose, breathe out fully, trying to get as much air out of the lungs as possible. Hold the Diskus® level and away from your mouth. ¨ Open your mouth and breathe in quickly and deeply through the Diskus®. Do not breathe in through your nose. ¨ Remove the Diskus® from your mouth. Hold your breath for about 10 seconds or as long as possible, then breathe out slowly.   ¨ Throw the inhaler away 1 month after you remove it from the foil pouch or when the dose indicator reaches 0.  · Advair® HFA:   ¨ You will use this medicine with a device called a metered-dose inhaler. The inhaler fits on the medicine canister and turns the medicine into a fine spray that you breathe in through your mouth and to your lungs. You may be told to use a spacer, which is a tube that is placed between the inhaler and your mouth. Your caregiver will show you how to use your inhaler and the spacer (if needed). ¨ Shake the inhaler well just before each use. Avoid spraying this medicine into your eyes. ¨ Prime the inhaler before you use it for the first time. Shake well, then spray into the air, away from your face. Shake and spray a total of 4 times. If the inhaler has been dropped or has not been used for more than 4 weeks, do 2 sprays into the air before use. ¨ To inhale this medicine, breathe out fully, trying to get as much air out of the lungs as possible. Put the mouthpiece just in front of your mouth with the canister upright. ¨ Open your mouth and breathe in slowly and deeply (like yawning), and at the same time firmly press down on the top of the canister once. ¨ Hold your breath for about 5 to 10 seconds, and then breathe out slowly. ¨ If you are supposed to use more than one puff, wait 1 to 2 minutes before inhaling the second puff. Repeat these steps for the next puff, starting with shaking the inhaler. ¨ Throw away the inhaler when the dose counter reaches 000. · Rinse your mouth out with water after you inhale your medicine. Do not swallow the water. · Airduo Respiclick®:   ¨ You will use this medicine with a device called a metered-dose inhaler. The inhaler fits on the medicine canister and turns the medicine into a fine spray that you breathe in through your mouth and to your lungs. You may be told to use a spacer, which is a tube that is placed between the inhaler and your mouth. Your caregiver will show you how to use your inhaler and the spacer (if needed).   ¨ Take the inhaler out of the pouch before you use it for the first time. ¨ Do not use the inhaler for this medicine with any other medicine. ¨ This medicine does not require priming. Do not use it with a spacer or volume holding chamber. ¨ Hold the inhaler upright and open the yellow cap all the way until it clicks. Do not open the yellow cap until you are ready to take a dose of this medicine. ¨ To inhale this medicine, breathe out fully, trying to get as much air out of the lungs as possible. Put the mouthpiece just in front of your mouth with the canister upright. ¨ Open your mouth and breathe in slowly and deeply (like yawning), and at the same time firmly press down on the top of the canister once. ¨ Hold your breath for about 5 to 10 seconds, and then breathe out slowly. ¨ Close the yellow cap after each inhalation. ¨ Rinse your mouth with water without swallowing after each inhalation. ¨ Keep the inhaler dry and clean at all times. Gently wipe the mouthpiece with a dry cloth or tissue as needed. ¨ The inhaler has a window that shows the number of doses remaining. This tells you when you are getting low on medicine. The doses counting down from 20 to 0 will show up in red to remind you to refill your prescription. Throw away the inhaler when the dose counter displays 0, 30 days after opening the pouch. · Missed dose: Take a dose as soon as you remember. If it is almost time for your next dose, wait until then and take a regular dose. Do not take extra medicine to make up for a missed dose. If you miss a dose of Airduo Respiclick®, skip the missed dose and go back to your regular dosing schedule. Do not double doses. · Keep the medicine in the foil pouch until you are ready to use it. Store at room temperature, away from heat and direct light. Do not freeze. · Store the canister at room temperature, away from heat and direct light. Do not freeze. Do not keep this medicine inside a car where it could be exposed to extreme heat or cold.  Do not poke holes in the canister or throw it into a fire, even if the canister is empty. · Ask your pharmacist, doctor, or health caregiver about the best way to dispose of the used medicine container and any leftover medicine. You will also need to throw away old medicine after the expiration date has passed. Drugs and Foods to Avoid:   Ask your doctor or pharmacist before using any other medicine, including over-the-counter medicines, vitamins, and herbal products. · Some foods and medicines can affect how fluticasone/salmeterol works. Tell your doctor if you are using any of the following:  ¨ Nefazodone  ¨ Blood pressure medicine  ¨ Diuretic (water pill)  ¨ Medicine to treat depression or an MAO inhibitor within the past 2 weeks  ¨ Medicine to treat HIV or AIDS (including atazanavir, indinavir, nelfinavir, ritonavir, saquinavir)  ¨ Medicine to treat an infection (including clarithromycin, itraconazole, ketoconazole, telithromycin)  ¨ Seizure medicine  Warnings While Using This Medicine:   · Tell your doctor if you are pregnant or breastfeeding, or if you have liver disease, cataracts, diabetes, glaucoma, heart disease, high blood pressure, heart rhythm problems, thyroid problems, seizures, or osteoporosis. Tell your doctor about any immune system problems or infections, including herpes simplex in your eye, tuberculosis, or parasites. Tell your doctor if you have been exposed to chickenpox or measles.   · This medicine may cause the following problems:  ¨ Higher risk of asthma-related hospital stays and death  ¨ Increased trouble breathing right after use (paradoxical bronchospasm)  ¨ Higher risk of pneumonia in people who have COPD  ¨ Higher risk of infection, including fungus infection in the mouth (thrush)  ¨ Low bone mineral density, which may lead to osteoporosis  ¨ Cataracts, glaucoma, or other vision problems  ¨ Slow growth in children  ¨ Problems with the adrenal glands  · This medicine will not stop an asthma attack that has already started. You should have another medicine to use in case of an acute asthma attack or for sudden COPD flare-ups. · If any of your asthma medicines do not seem to be working as well as usual, call your doctor right away. Do not change your doses or stop using your medicines without asking your doctor. · Tell any doctor or dentist who treats you that you are using this medicine. · Call your doctor if your symptoms do not improve or if they get worse. · Your doctor will check your progress and the effects of this medicine at regular visits. Keep all appointments. · Keep all medicine out of the reach of children. Never share your medicine with anyone. Possible Side Effects While Using This Medicine:   Call your doctor right away if you notice any of these side effects:  · Allergic reaction: Itching or hives, swelling in your face or hands, swelling or tingling in your mouth or throat, chest tightness, trouble breathing  · Chest pain, trouble breathing  · Dry mouth, increased thirst, muscle cramps, nausea or vomiting, uneven heartbeat  · Eye pain or trouble seeing  · Fast, pounding, or uneven heartbeat  · Fever, chills, cough, runny or stuffy nose, sore throat, body aches  · Tremors, nervousness, or shaking  · Unusual tiredness or weakness  · Worse breathing problems  If you notice these less serious side effects, talk with your doctor:   · Headache  · Hoarseness, voice changes, sore throat  · Runny or stuffy nose  · White patches inside the mouth or throat  If you notice other side effects that you think are caused by this medicine, tell your doctor. Call your doctor for medical advice about side effects. You may report side effects to FDA at 8-374-FDA-8409  © 2017 [facility] Information is for End User's use only and may not be sold, redistributed or otherwise used for commercial purposes. The above information is an  only.  It is not intended as medical advice for individual conditions or treatments. Talk to your doctor, nurse or pharmacist before following any medical regimen to see if it is safe and effective for you.
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
## 2018-06-03 NOTE — PROGRESS NOTES
1. Have you been to the ER, urgent care clinic since your last visit? Hospitalized since your last visit? No    2. Have you seen or consulted any other health care providers outside of the [address] [place] [address] since your last visit? Include any pap smears or colon screening. No    Chief Complaint   Patient presents with    Medication Evaluation     inhaler not working, Spiriva       Blood pressure 147/84, pulse 83, temperature 97.9 °F (36.6 °C), temperature source Oral, resp. rate 18, height 6' 4\" (1.93 m), weight 233 lb (105.7 kg), SpO2 95 %.

## 2018-06-03 NOTE — MR AVS SNAPSHOT
[address] [phone] Patient: Kaden Porter MRN: [id number] Visit Information Date & Time Provider Department Dept. Phone Encounter #  
 6/3/2018  1:00 PM Samanta Garcia, [address] [address] [phone] [id number] Your Appointments 5/9/2019 11:40 AM  
ESTABLISHED PATIENT with Tony Mena MD  
[facility] ([facility]) Appt Note: annual visit [address] [facility] 600 1007 [facility]  
[address] [facility] [facility] [address] [address] Upcoming Health Maintenance Date Due  
 EYE EXAM RETINAL OR DILATED Q1 4/24/2018 Influenza Age 5 to Adult 8/1/2018 FOOT EXAM Q1 10/5/2018 MICROALBUMIN Q1 10/5/2018 LIPID PANEL Q1 10/5/2018 HEMOGLOBIN A1C Q6M 10/18/2018 DTaP/Tdap/Td series (2 - Td) 9/15/2022 COLONOSCOPY 4/25/2026 Allergies as of 6/3/2018  Review Complete On: 6/3/2018 By: Mile Chance LPN No Known Allergies Current Immunizations  Reviewed on 6/3/2018 No immunizations on file. Reviewed by Mile Chance LPN on 5/4/1281 at  9:38 PM  
You Were Diagnosed With   
  
 Codes Comments COPD [facility] exacerb[facility]    -  Primary ICD-10-CM: J44.1 ICD-9-CM: 491.21 Simple chronic bronchitis (HCC)     ICD-10-CM: J41.0 ICD-9-CM: 491.0 Cough     ICD-10-CM: R05 ICD-9-CM: 037. 2 Vitals BP Pulse Temp Resp Height(growth percentile) Weight(growth percentile) 147/84 83 97.9 °F (36.6 °C) (Oral) 18 6' 4\" (1.93 m) 233 lb (105.7 kg) SpO2 BMI Smoking Status 95% 28.36 kg/m2 Former Smoker BMI and BSA Data Body Mass Index Body Surface Area  
 28.36 kg/m 2 2.38 m 2 Preferred Pharmacy Pharmacy Name Phone [facility], [address] [facility] [phone] Your Updated Medication List  
  
   
This list is accurate as of 6/3/18  1:43 PM.  Always use your most recent med list.  
  
  
  
  
 albuterol 90 mcg/actuation inhaler Commonly known as:  PROVENTIL HFA, VENTOLIN HFA, PROAIR HFA Take 2 Puffs by inhalation every four (4) hours as needed for Wheezing. BASAGLAR KWIKPEN U-100 INSULIN 100 unit/mL (3 mL) Inpn Generic drug:  insulin glargine INJECT 33 UNITS SUBCUTANEOUSLY ONCE DAILY AT  NIGHT BYDUREON 2 mg/0.65 mL Pnij Generic drug:  exenatide microspheres  
  
 fluticasone-salmeterol 250-50 mcg/dose diskus inhaler Commonly known as:  ADVAIR Take 1 Puff by inhalation every twelve (12) hours. gabapentin 600 mg tablet Commonly known as:  NEURONTIN Take  by mouth two (2) times a day. glipiZIDE 10 mg tablet Commonly known as:  GLUCOTROL  
TAKE ONE TABLET BY MOUTH TWICE DAILY Insulin Needles (Disposable) 31 gauge x 1/4\" Ndle Commonly known as:  ULTICARE PEN NEEDLE  
USE ONE PEN NEEDLE DAILY Lancets Misc Check blood glucose daily  
  
 lisinopril-hydroCHLOROthiazide 20-12.5 mg per tablet Commonly known as:  PRINZIDE, ZESTORETIC  
TAKE ONE TABLET BY MOUTH ONCE DAILY  
  
 metFORMIN 1,000 mg tablet Commonly known as:  GLUCOPHAGE  
TAKE ONE TABLET BY MOUTH TWICE DAILY WITH MEALS  
  
 oxyCODONE-acetaminophen 5-325 mg per tablet Commonly known as:  PERCOCET  
  
 predniSONE 20 mg tablet Commonly known as:  Maximino Mauro Take 2 Tabs by mouth daily (with breakfast) for 5 days. tiotropium 18 mcg inhalation capsule Commonly known as:  [address] Take 1 Cap by inhalation daily. Indications: BRONCHOSPASM PREVENTION WITH COPD  
  
 TRUE METRIX GLUCOSE METER Misc Generic drug:  Blood-Glucose Meter USE TO CHECK GLUCOSE ONCE DAILY * TRUETEST TEST STRIPS strip Generic drug:  glucose blood VI test strips CHECK BLOOD GLUCOSE FASTING ONCE DAILY * TRUETEST TEST STRIPS strip Generic drug:  glucose blood VI test strips USE AS DIRECTED TO TEST BLOOD SUGAR ONCE DAILY * TRUE METRIX GLUCOSE TEST STRIP strip Generic drug:  glucose blood VI test strips USE ONE STRIP TO CHECK GLUCOSE ONCE DAILY * Notice: This list has 3 medication(s) that are the same as other medications prescribed for you. Read the directions carefully, and ask your doctor or other care provider to review them with you. Prescriptions Sent to Pharmacy Refills  
 predniSONE (DELTASONE) 20 mg tablet 0 Sig: Take 2 Tabs by mouth daily (with breakfast) for 5 days. Class: Normal  
 Pharmacy: [address] [facility], [address] [facility] #: [phone] Route: Oral  
 fluticasone-salmeterol (ADVAIR) 250-50 mcg/dose diskus inhaler 3 Sig: Take 1 Puff by inhalation every twelve (12) hours. Class: Normal  
 Pharmacy: [address] [facility], [address] Ph #: [phone] Route: Inhalation To-Do List   
 06/03/2018 Imaging:  XR CHEST PA LAT Patient Instructions Fluticasone/Salmeterol (By breathing) Fluticasone Propionate (onzm-TUP-o-sone SUNN-jhy-hj-jenna), Salmeterol Xinafoate (alexi-ME-ter-ol hdm-STT-nq-ate) Treats and prevents symptoms of asthma and chronic obstructive pulmonary disease (COPD). This medicine contains a corticosteroid. Brand Name(s): Advair Diskus 100/50, Advair Diskus 250/50, Advair Diskus 500/50, Advair /21, Advair /21, Advair HFA 45/21 There may be other brand names for this medicine. When This Medicine Should Not Be Used: This medicine is not right for everyone. Do not use it if you had an allergic reaction to salmeterol, fluticasone, or milk proteins. Do not use this medicine to treat an asthma attack or a COPD flare-up. How to Use This Medicine:  
Liquid Under Pressure, Powder Under Pressure, Disk · Take your medicine as directed.  Your dose may need to be changed several times to find what works best for you. Never use more medicine than your doctor prescribed. · This medicine should come with a Medication Guide. Ask your pharmacist for a copy if you do not have one. · Advair Diskus®:  
¨ The powder medicine is held in a foil blister package inside the Diskus® inhaler. The Diskus® pokes a hole in each blister one at a time when you push the lever. ¨ Do not use a spacer with the Diskus®. ¨ Keep the Diskus® inhaler closed when you are not using it. Keep the inhaler dry at all times. Do not blow into it. ¨ Before you inhale your dose, breathe out fully, trying to get as much air out of the lungs as possible. Hold the Diskus® level and away from your mouth. ¨ Open your mouth and breathe in quickly and deeply through the Diskus®. Do not breathe in through your nose. ¨ Remove the Diskus® from your mouth. Hold your breath for about 10 seconds or as long as possible, then breathe out slowly. ¨ Throw the inhaler away 1 month after you remove it from the foil pouch or when the dose indicator reaches 0. 
· Advair® HFA:  
¨ You will use this medicine with a device called a metered-dose inhaler. The inhaler fits on the medicine canister and turns the medicine into a fine spray that you breathe in through your mouth and to your lungs. You may be told to use a spacer, which is a tube that is placed between the inhaler and your mouth. Your caregiver will show you how to use your inhaler and the spacer (if needed). ¨ Shake the inhaler well just before each use. Avoid spraying this medicine into your eyes. ¨ Prime the inhaler before you use it for the first time. Shake well, then spray into the air, away from your face. Shake and spray a total of 4 times. If the inhaler has been dropped or has not been used for more than 4 weeks, do 2 sprays into the air before use.  
¨ To inhale this medicine, breathe out fully, trying to get as much air out of the lungs as possible. Put the mouthpiece just in front of your mouth with the canister upright. ¨ Open your mouth and breathe in slowly and deeply (like yawning), and at the same time firmly press down on the top of the canister once. ¨ Hold your breath for about 5 to 10 seconds, and then breathe out slowly. ¨ If you are supposed to use more than one puff, wait 1 to 2 minutes before inhaling the second puff. Repeat these steps for the next puff, starting with shaking the inhaler. ¨ Throw away the inhaler when the dose counter reaches 000. · Rinse your mouth out with water after you inhale your medicine. Do not swallow the water. · Airduo Respiclick®:  
¨ You will use this medicine with a device called a metered-dose inhaler. The inhaler fits on the medicine canister and turns the medicine into a fine spray that you breathe in through your mouth and to your lungs. You may be told to use a spacer, which is a tube that is placed between the inhaler and your mouth. Your caregiver will show you how to use your inhaler and the spacer (if needed). ¨ Take the inhaler out of the pouch before you use it for the first time. ¨ Do not use the inhaler for this medicine with any other medicine. ¨ This medicine does not require priming. Do not use it with a spacer or volume holding chamber. ¨ Hold the inhaler upright and open the yellow cap all the way until it clicks. Do not open the yellow cap until you are ready to take a dose of this medicine. ¨ To inhale this medicine, breathe out fully, trying to get as much air out of the lungs as possible. Put the mouthpiece just in front of your mouth with the canister upright. ¨ Open your mouth and breathe in slowly and deeply (like yawning), and at the same time firmly press down on the top of the canister once. ¨ Hold your breath for about 5 to 10 seconds, and then breathe out slowly. ¨ Close the yellow cap after each inhalation. ¨ Rinse your mouth with water without swallowing after each inhalation. ¨ Keep the inhaler dry and clean at all times. Gently wipe the mouthpiece with a dry cloth or tissue as needed. ¨ The inhaler has a window that shows the number of doses remaining. This tells you when you are getting low on medicine. The doses counting down from 20 to 0 will show up in red to remind you to refill your prescription. Throw away the inhaler when the dose counter displays 0, 30 days after opening the pouch. · Missed dose: Take a dose as soon as you remember. If it is almost time for your next dose, wait until then and take a regular dose. Do not take extra medicine to make up for a missed dose. If you miss a dose of Airduo Respiclick®, skip the missed dose and go back to your regular dosing schedule. Do not double doses. · Keep the medicine in the foil pouch until you are ready to use it. Store at room temperature, away from heat and direct light. Do not freeze. · Store the canister at room temperature, away from heat and direct light. Do not freeze. Do not keep this medicine inside a car where it could be exposed to extreme heat or cold. Do not poke holes in the canister or throw it into a fire, even if the canister is empty. · Ask your pharmacist, doctor, or health caregiver about the best way to dispose of the used medicine container and any leftover medicine. You will also need to throw away old medicine after the expiration date has passed. Drugs and Foods to Avoid: Ask your doctor or pharmacist before using any other medicine, including over-the-counter medicines, vitamins, and herbal products. · Some foods and medicines can affect how fluticasone/salmeterol works. Tell your doctor if you are using any of the following: ¨ Nefazodone ¨ Blood pressure medicine ¨ Diuretic (water pill) ¨ Medicine to treat depression or an MAO inhibitor within the past 2 weeks ¨ Medicine to treat HIV or AIDS (including atazanavir, indinavir, nelfinavir, ritonavir, saquinavir) ¨ Medicine to treat an infection (including clarithromycin, itraconazole, ketoconazole, telithromycin) ¨ Seizure medicine Warnings While Using This Medicine: · Tell your doctor if you are pregnant or breastfeeding, or if you have liver disease, cataracts, diabetes, glaucoma, heart disease, high blood pressure, heart rhythm problems, thyroid problems, seizures, or osteoporosis. Tell your doctor about any immune system problems or infections, including herpes simplex in your eye, tuberculosis, or parasites. Tell your doctor if you have been exposed to chickenpox or measles. · This medicine may cause the following problems: 
¨ Higher risk of asthma-related hospital stays and death ¨ Increased trouble breathing right after use (paradoxical bronchospasm) ¨ Higher risk of pneumonia in people who have COPD ¨ Higher risk of infection, including fungus infection in the mouth (thrush) ¨ Low bone mineral density, which may lead to osteoporosis ¨ Cataracts, glaucoma, or other vision problems ¨ Slow growth in children ¨ Problems with the adrenal glands · This medicine will not stop an asthma attack that has already started. You should have another medicine to use in case of an acute asthma attack or for sudden COPD flare-ups. · If any of your asthma medicines do not seem to be working as well as usual, call your doctor right away. Do not change your doses or stop using your medicines without asking your doctor. · Tell any doctor or dentist who treats you that you are using this medicine. · Call your doctor if your symptoms do not improve or if they get worse. · Your doctor will check your progress and the effects of this medicine at regular visits. Keep all appointments. · Keep all medicine out of the reach of children. Never share your medicine with anyone. Possible Side Effects While Using This Medicine: Call your doctor right away if you notice any of these side effects: · Allergic reaction: Itching or hives, swelling in your face or hands, swelling or tingling in your mouth or throat, chest tightness, trouble breathing · Chest pain, trouble breathing · Dry mouth, increased thirst, muscle cramps, nausea or vomiting, uneven heartbeat · Eye pain or trouble seeing · Fast, pounding, or uneven heartbeat · Fever, chills, cough, runny or stuffy nose, sore throat, body aches · Tremors, nervousness, or shaking · Unusual tiredness or weakness · Worse breathing problems If you notice these less serious side effects, talk with your doctor:  
· Headache 
· Hoarseness, voice changes, sore throat · Runny or stuffy nose · White patches inside the mouth or throat If you notice other side effects that you think are caused by this medicine, tell your doctor. Call your doctor for medical advice about side effects. You may report side effects to FDA at 6-550-IAL-5858 © 2017 [facility] Information is for End User's use only and may not be sold, redistributed or otherwise used for commercial purposes. The above information is an  only. It is not intended as medical advice for individual conditions or treatments. Talk to your doctor, nurse or pharmacist before following any medical regimen to see if it is safe and effective for you. Introducing [facility]! Mendy Castro introduces [employer] patient portal. Now you can access parts of your medical record, email your doctor's office, and request medication refills online. 1. In your internet browser, go to [email or web address] 2. Click on the First Time User? Click Here link in the Sign In box. You will see the New Member Sign Up page. 3. Enter your [employer] Access Code exactly as it appears below. You will not need to use this code after youve completed the sign-up process.  If you do not sign up before the expiration date, you must request a new code. · [employer] Access Code: [email or web address] Expires: 7/30/2018  2:14 PM 
 
4. Enter the last four digits of your Social Security Number (xxxx) and Date of Birth (mm/dd/yyyy) as indicated and click Submit. You will be taken to the next sign-up page. 5. Create a [employer] ID. This will be your [employer] login ID and cannot be changed, so think of one that is secure and easy to remember. 6. Create a [employer] password. You can change your password at any time. 7. Enter your Password Reset Question and Answer. This can be used at a later time if you forget your password. 8. Enter your e-mail address. You will receive e-mail notification when new information is available in [address] 9. Click Sign Up. You can now view and download portions of your medical record. 10. Click the Download Summary menu link to download a portable copy of your medical information. If you have questions, please visit the Frequently Asked Questions section of the [employer] website. Remember, [employer] is NOT to be used for urgent needs. For medical emergencies, dial 911. Now available from your iPhone and Android! Please provide this summary of care documentation to your next provider. Your primary care clinician is listed as [facility]. If you have any questions after today's visit, please call [phone].

## 2018-06-06 ENCOUNTER — TELEPHONE (OUTPATIENT)
Dept: FAMILY MEDICINE CLINIC | Age: 63
End: 2018-06-06

## 2018-06-06 NOTE — TELEPHONE ENCOUNTER
Results of chest x-ray of Diana 3. Let her know if physician reviewed.     Lulu Marquis [facility] Radiology  Ph---[phone]  QR---[phone]

## 2018-06-07 NOTE — TELEPHONE ENCOUNTER
Kevinine Lab with [facility] radiology calling back to ask if results reviewed by doctor. Verified that they were per copied notes below from [facility]. IR Procedure Log   Reviewed By Guillermina Caruso MD on 6/3/2018  3:39 PM   PACS Images   Show images for XR CHEST PA LAT     Napoleonine Lab is satisfied.

## 2018-06-08 ENCOUNTER — TELEPHONE (OUTPATIENT)
Dept: FAMILY MEDICINE CLINIC | Age: 63
End: 2018-06-08

## 2018-06-08 NOTE — TELEPHONE ENCOUNTER
Called patient per Dr Boy Lima 6/3/18. Inquiring how the patient is doing since starting the steroid and antibiotic. Patient states he is doing much better and his blood sugars have been raised fasting is 170. He states he expected that due to the steroid. He stating he is breathing a lot better. He just took his last of medication. Informed patient if he feels things take a turn and not getting better since completing the medication please don't hesitate to call the office to get an appointment to be seen or to the nearest ER or urgent care. Patient verbalized understanding.

## 2018-07-16 DIAGNOSIS — J44.1 COPD EXACERBATION (HCC): Primary | ICD-10-CM

## 2018-07-17 RX ORDER — PEN NEEDLE, DIABETIC 29 G X1/2"
NEEDLE, DISPOSABLE MISCELLANEOUS
Qty: 30 PEN NEEDLE | Refills: 11 | Status: SHIPPED | OUTPATIENT
Start: 2018-07-17 | End: 2018-08-20 | Stop reason: SDUPTHER

## 2018-07-17 RX ORDER — GLIPIZIDE 10 MG/1
TABLET ORAL
Qty: 180 TAB | Refills: 3 | Status: SHIPPED | OUTPATIENT
Start: 2018-07-17 | End: 2018-09-18 | Stop reason: SDUPTHER

## 2018-07-17 RX ORDER — ALBUTEROL SULFATE 90 UG/1
2 AEROSOL, METERED RESPIRATORY (INHALATION)
Qty: 1 INHALER | Refills: 3 | Status: SHIPPED | OUTPATIENT
Start: 2018-07-17 | End: 2019-01-02 | Stop reason: SDUPTHER

## 2018-07-26 ENCOUNTER — OFFICE VISIT (OUTPATIENT)
Dept: FAMILY MEDICINE CLINIC | Age: 63
End: 2018-07-26

## 2018-07-26 VITALS
SYSTOLIC BLOOD PRESSURE: 127 MMHG | HEART RATE: 84 BPM | HEIGHT: 76 IN | OXYGEN SATURATION: 95 % | DIASTOLIC BLOOD PRESSURE: 72 MMHG | WEIGHT: 233 LBS | TEMPERATURE: 98 F | BODY MASS INDEX: 28.37 KG/M2

## 2018-07-26 DIAGNOSIS — R06.02 SHORTNESS OF BREATH: Primary | ICD-10-CM

## 2018-07-26 DIAGNOSIS — J91.8 PLEURAL EFFUSION ASSOCIATED WITH PULMONARY INFECTION: ICD-10-CM

## 2018-07-26 DIAGNOSIS — J18.9 PLEURAL EFFUSION ASSOCIATED WITH PULMONARY INFECTION: ICD-10-CM

## 2018-07-26 DIAGNOSIS — J44.1 COPD WITH EXACERBATION (HCC): ICD-10-CM

## 2018-07-26 RX ORDER — LEVOFLOXACIN 500 MG/1
500 TABLET, FILM COATED ORAL DAILY
Qty: 10 TAB | Refills: 0 | Status: SHIPPED | OUTPATIENT
Start: 2018-07-26 | End: 2018-07-26 | Stop reason: SDUPTHER

## 2018-07-26 RX ORDER — LEVOFLOXACIN 500 MG/1
500 TABLET, FILM COATED ORAL DAILY
Qty: 7 TAB | Refills: 0 | Status: SHIPPED | OUTPATIENT
Start: 2018-07-26 | End: 2018-08-02

## 2018-07-26 RX ORDER — PREDNISONE 20 MG/1
40 TABLET ORAL
Qty: 10 TAB | Refills: 0 | Status: SHIPPED | OUTPATIENT
Start: 2018-07-26 | End: 2018-07-31

## 2018-07-26 NOTE — MR AVS SNAPSHOT
[address] 
[phone] Patient: Sneha Neal MRN: [id number] RYK:5/78/2010 Visit Information Date & Time Provider Department Dept. Phone Encounter #  
 7/26/2018  4:00 PM Corrinne Brackett, [address] [phone] [id number] Your Appointments 5/9/2019 11:40 AM  
ESTABLISHED PATIENT with Lazarus Dad, MD  
[facility]) Appt Note: annual visit [address] [facility] 600 1900 [facility]  
[address] [facility] [address] [address] Upcoming Health Maintenance Date Due  
 EYE EXAM RETINAL OR DILATED Q1 4/24/2018 Influenza Age 5 to Adult 8/1/2018 FOOT EXAM Q1 10/5/2018 MICROALBUMIN Q1 10/5/2018 LIPID PANEL Q1 10/5/2018 HEMOGLOBIN A1C Q6M 10/18/2018 DTaP/Tdap/Td series (2 - Td) 9/15/2022 COLONOSCOPY 4/25/2026 Allergies as of 7/26/2018  Review Complete On: 7/26/2018 By: Prudence Bella LPN No Known Allergies Current Immunizations  Reviewed on 6/3/2018 No immunizations on file. Not reviewed this visit You Were Diagnosed With   
  
 Codes Comments Shortness of breath    -  Primary ICD-10-CM: R06.02 
ICD-9-CM: 786.05   
 COPD with exacerbation ([facility]     ICD-10-CM: J44.1 ICD-9-CM: 491.21 Pleural effusion associated with pulmonary infection     ICD-10-CM: J18.9, J91.8 ICD-9-CM: 511.89 Vitals BP Pulse Temp Height(growth percentile) Weight(growth percentile) SpO2  
 127/72 84 98 °F (36.7 °C) (Oral) 6' 4\" (1.93 m) 233 lb (105.7 kg) 95% BMI Smoking Status 28.36 kg/m2 Former Smoker Vitals History BMI and BSA Data Body Mass Index Body Surface Area  
 28.36 kg/m 2 2.38 m 2 [facility] [phone] [phone] Your Updated Medication List  
  
   
This list is accurate as of 7/26/18  5:32 PM.  Always use your most recent med list.  
  
  
  
  
 albuterol 90 mcg/actuation inhaler Commonly known as:  PROVENTIL HFA, VENTOLIN HFA, PROAIR HFA Take 2 Puffs by inhalation every four (4) hours as needed for Wheezing. BASAGLAR KWIKPEN U-100 INSULIN 100 unit/mL (3 mL) Inpn Generic drug:  insulin glargine INJECT 33 UNITS SUBCUTANEOUSLY ONCE DAILY AT  NIGHT BYDUREON 2 mg/0.65 mL Pnij Generic drug:  exenatide microspheres  
  
 fluticasone-salmeterol 250-50 mcg/dose diskus inhaler Commonly known as:  ADVAIR Take 1 Puff by inhalation every twelve (12) hours. gabapentin 600 mg tablet Commonly known as:  NEURONTIN Take  by mouth two (2) times a day. glipiZIDE 10 mg tablet Commonly known as:  GLUCOTROL  
TAKE ONE TABLET BY MOUTH TWICE DAILY Insulin Needles (Disposable) 31 gauge x 1/4\" Ndle Commonly known as:  ULTICARE PEN NEEDLE  
USE ONE PEN NEEDLE DAILY Lancets Misc Check blood glucose daily  
  
 levoFLOXacin 500 mg tablet Commonly known as:  Hanna Born Take 1 Tab by mouth daily for 10 days. Indications: PNEUMOCOCCAL PNEUMONIA  
  
 lisinopril-hydroCHLOROthiazide 20-12.5 mg per tablet Commonly known as:  PRINZIDE, ZESTORETIC  
TAKE ONE TABLET BY MOUTH ONCE DAILY  
  
 metFORMIN 1,000 mg tablet Commonly known as:  GLUCOPHAGE  
TAKE ONE TABLET BY MOUTH TWICE DAILY WITH MEALS  
  
 oxyCODONE-acetaminophen 5-325 mg per tablet Commonly known as:  PERCOCET  
  
 predniSONE 20 mg tablet Commonly known as:  Hollace Venkata Take 2 Tabs by mouth daily (with breakfast) for 5 days. tiotropium 18 mcg inhalation capsule Commonly known as:  [employer] Rodriguez XipLink Take 1 Cap by inhalation daily. Indications: BRONCHOSPASM PREVENTION WITH COPD  
  
 TRUE METRIX GLUCOSE METER [facility] [place] drug:  Blood-Glucose Meter USE TO CHECK GLUCOSE ONCE DAILY * TRUETEST TEST STRIPS strip Generic drug:  glucose blood VI test strips CHECK BLOOD GLUCOSE FASTING ONCE DAILY * TRUETEST TEST STRIPS strip Generic drug:  glucose blood VI test strips USE AS DIRECTED TO TEST BLOOD SUGAR ONCE DAILY * TRUE METRIX GLUCOSE TEST STRIP strip Generic drug:  glucose blood VI test strips USE ONE STRIP TO CHECK GLUCOSE ONCE DAILY * Notice: This list has 3 medication(s) that are the same as other medications prescribed for you. Read the directions carefully, and ask your doctor or other care provider to review them with you. Prescriptions Sent to Pharmacy Refills  
 levoFLOXacin (LEVAQUIN) 500 mg tablet 0 Sig: Take 1 Tab by mouth daily for 10 days. Indications: PNEUMOCOCCAL PNEUMONIA Class: Normal  
 Pharmacy: [facility] Hector Noble [address] Ph #: [phone] Route: Oral  
 tiotropium (SPIRIVA WITH HANDIHALER) 18 mcg inhalation capsule 3 Sig: Take 1 Cap by inhalation daily. Indications: BRONCHOSPASM PREVENTION WITH COPD Class: Normal  
 Pharmacy: [facility] Hector Noble [address] Ph #: [phone] Route: Inhalation  
 predniSONE (DELTASONE) 20 mg tablet 0 Sig: Take 2 Tabs by mouth daily (with breakfast) for 5 days. Class: Normal  
 Pharmacy: [facility] Hector Noble [address] Ph #: [phone] Route: Oral  
  
We Performed the Following AMB POC EKG ROUTINE W/ 12 LEADS, INTER & REP [65490 CPT(R)] To-Do List   
 07/26/2018 Imaging:  XR CHEST PA LAT   
  
 07/31/2018 Imaging:  CT CHEST WO CONT Patient Instructions COPD Exacerbation Plan: Care Instructions Your Care Instructions If you have chronic obstructive pulmonary disease (COPD), your usual shortness of breath could suddenly get worse.  You may start coughing more and have more mucus. This flare-up is called a COPD exacerbation (say \"jj-XCL-xz-BAY-deisy\"). A lung infection or air pollution could set off an exacerbation. Sometimes it can happen after a quick change in temperature or being around chemicals. Work with your doctor to make a plan for dealing with an exacerbation. You can better manage it if you plan ahead. Follow-up care is a key part of your treatment and safety. Be sure to make and go to all appointments, and call your doctor if you are having problems. It's also a good idea to know your test results and keep a list of the medicines you take. How can you care for yourself at home? During an exacerbation · Do not panic if you start to have one. Quick treatment at home may help you prevent serious breathing problems. If you have a COPD exacerbation plan that you developed with your doctor, follow it. · Take your medicines exactly as your doctor tells you. ¨ Use your inhaler as directed by your doctor. If your symptoms do not get better after you use your medicine, have someone take you to the emergency room. Call an ambulance if necessary. ¨ With inhaled medicines, a spacer or a nebulizer may help you get more medicine to your lungs. Ask your doctor or pharmacist how to use them properly. Practice using the spacer in front of a mirror before you have an exacerbation. This may help you get the medicine into your lungs quickly. ¨ If your doctor has given you steroid pills, take them as directed. ¨ Your doctor may have given you a prescription for antibiotics, which you can fill if you need it. ¨ Talk to your doctor if you have any problems with your medicine. And call your doctor if you have to use your antibiotic or steroid pills. Preventing an exacerbation · Do not smoke. This is the most important step you can take to prevent more damage to your lungs and prevent problems.  If you already smoke, it is never too late to stop. If you need help quitting, talk to your doctor about stop-smoking programs and medicines. These can increase your chances of quitting for good. · Take your daily medicines as prescribed. · Avoid colds and flu. ¨ Get a pneumococcal vaccine. ¨ Get a flu vaccine each year, as soon as it is available. Ask those you live or work with to do the same, so they will not get the flu and infect you. ¨ Try to stay away from people with colds or the flu. ¨ Wash your hands often. · Avoid secondhand smoke; air pollution; cold, dry air; hot, humid air; and high altitudes. Stay at home with your windows closed when air pollution is bad. · Learn breathing techniques for COPD, such as breathing through pursed lips. These techniques can help you breathe easier during an exacerbation. When should you call for help? Call 911 anytime you think you may need emergency care. For example, call if: 
  · You have severe trouble breathing.  
  · You have severe chest pain.  
 Call your doctor now or seek immediate medical care if: 
  · You have new or worse shortness of breath.  
  · You develop new chest pain.  
  · You are coughing more deeply or more often, especially if you notice more mucus or a change in the color of your mucus.  
  · You cough up blood.  
  · You have new or increased swelling in your legs or belly.  
  · You have a fever.  
 Watch closely for changes in your health, and be sure to contact your doctor if: 
  · You need to use your antibiotic or steroid pills.  
  · Your symptoms are getting worse. Where can you learn more? Go to http://jaz-bhavna.info/. Enter X151 in the search box to learn more about \"COPD Exacerbation Plan: Care Instructions. \" Current as of: December 6, 2017 Content Version: 11.7 © 1006-1708 [employer].  Care instructions adapted under license by [employer] (which disclaims liability or warranty for this information). If you have questions about a medical condition or this instruction, always ask your healthcare professional. [address] any warranty or liability for your use of this information. Introducing [facility] SERVICES! Andreas Olguin introduces [employer] patient portal. Now you can access parts of your medical record, email your doctor's office, and request medication refills online. 1. In your internet browser, go to [email or web address] 2. Click on the First Time User? Click Here link in the Sign In box. You will see the New Member Sign Up page. 3. Enter your [employer] Access Code exactly as it appears below. You will not need to use this code after youve completed the sign-up process. If you do not sign up before the expiration date, you must request a new code. · [employer] Access Code: [email or web address] Expires: 7/30/2018  2:14 PM 
 
4. Enter the last four digits of your Social Security Number (xxxx) and Date of Birth (mm/dd/yyyy) as indicated and click Submit. You will be taken to the next sign-up page. 5. Create a [employer] ID. This will be your [employer] login ID and cannot be changed, so think of one that is secure and easy to remember. 6. Create a [employer] password. You can change your password at any time. 7. Enter your Password Reset Question and Answer. This can be used at a later time if you forget your password. 8. Enter your e-mail address. You will receive e-mail notification when new information is available in [address] 9. Click Sign Up. You can now view and download portions of your medical record. 10. Click the Download Summary menu link to download a portable copy of your medical information. If you have questions, please visit the Frequently Asked Questions section of the [employer] website. Remember, [employer] is NOT to be used for urgent needs. For medical emergencies, dial 911. Now available from your iPhone and Android! Please provide this summary of care documentation to your next provider. Your primary care clinician is listed as Patrice Ahn. If you have any questions after today's visit, please call [phone].
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
## 2018-07-26 NOTE — PATIENT INSTRUCTIONS
COPD Exacerbation Plan: Care Instructions  Your Care Instructions    If you have chronic obstructive pulmonary disease (COPD), your usual shortness of breath could suddenly get worse. You may start coughing more and have more mucus. This flare-up is called a COPD exacerbation (say \"hn-RBJ-bh-BAY-deisy\"). A lung infection or air pollution could set off an exacerbation. Sometimes it can happen after a quick change in temperature or being around chemicals. Work with your doctor to make a plan for dealing with an exacerbation. You can better manage it if you plan ahead. Follow-up care is a key part of your treatment and safety. Be sure to make and go to all appointments, and call your doctor if you are having problems. It's also a good idea to know your test results and keep a list of the medicines you take. How can you care for yourself at home? During an exacerbation  · Do not panic if you start to have one. Quick treatment at home may help you prevent serious breathing problems. If you have a COPD exacerbation plan that you developed with your doctor, follow it. · Take your medicines exactly as your doctor tells you. ¨ Use your inhaler as directed by your doctor. If your symptoms do not get better after you use your medicine, have someone take you to the emergency room. Call an ambulance if necessary. ¨ With inhaled medicines, a spacer or a nebulizer may help you get more medicine to your lungs. Ask your doctor or pharmacist how to use them properly. Practice using the spacer in front of a mirror before you have an exacerbation. This may help you get the medicine into your lungs quickly. ¨ If your doctor has given you steroid pills, take them as directed. ¨ Your doctor may have given you a prescription for antibiotics, which you can fill if you need it. ¨ Talk to your doctor if you have any problems with your medicine.  And call your doctor if you have to use your antibiotic or steroid pills.  Preventing an exacerbation  · Do not smoke. This is the most important step you can take to prevent more damage to your lungs and prevent problems. If you already smoke, it is never too late to stop. If you need help quitting, talk to your doctor about stop-smoking programs and medicines. These can increase your chances of quitting for good. · Take your daily medicines as prescribed. · Avoid colds and flu. ¨ Get a pneumococcal vaccine. ¨ Get a flu vaccine each year, as soon as it is available. Ask those you live or work with to do the same, so they will not get the flu and infect you. ¨ Try to stay away from people with colds or the flu. ¨ Wash your hands often. · Avoid secondhand smoke; air pollution; cold, dry air; hot, humid air; and high altitudes. Stay at home with your windows closed when air pollution is bad. · Learn breathing techniques for COPD, such as breathing through pursed lips. These techniques can help you breathe easier during an exacerbation. When should you call for help? Call 911 anytime you think you may need emergency care. For example, call if:    · You have severe trouble breathing.     · You have severe chest pain.    Call your doctor now or seek immediate medical care if:    · You have new or worse shortness of breath.     · You develop new chest pain.     · You are coughing more deeply or more often, especially if you notice more mucus or a change in the color of your mucus.     · You cough up blood.     · You have new or increased swelling in your legs or belly.     · You have a fever.    Watch closely for changes in your health, and be sure to contact your doctor if:    · You need to use your antibiotic or steroid pills.     · Your symptoms are getting worse. Where can you learn more? Go to http://jaz-bhavna.info/. Enter B229 in the search box to learn more about \"COPD Exacerbation Plan: Care Instructions. \"  Current as of: December 6, 2017  Content Version: 11.7  © 4642-6514 [employer]. Care instructions adapted under license by [employer] (which disclaims liability or warranty for this information). If you have questions about a medical condition or this instruction, always ask your healthcare professional. [address] any warranty or liability for your use of this information.

## 2018-07-27 ENCOUNTER — TELEPHONE (OUTPATIENT)
Dept: FAMILY MEDICINE CLINIC | Age: 63
End: 2018-07-27

## 2018-07-27 NOTE — TELEPHONE ENCOUNTER
[phone], [phone]  [facility]    Called to ask for physician to review results of Chest x-ray of 7/26.

## 2018-08-01 RX ORDER — CALCIUM CITRATE/VITAMIN D3 200MG-6.25
TABLET ORAL
Qty: 50 STRIP | Refills: 1 | Status: SHIPPED | OUTPATIENT
Start: 2018-08-01 | End: 2018-09-22 | Stop reason: SDUPTHER

## 2018-08-02 ENCOUNTER — HOSPITAL ENCOUNTER (OUTPATIENT)
Dept: CT IMAGING | Age: 63
Discharge: HOME | End: 2018-08-02
Attending: FAMILY MEDICINE
Payer: MEDICAID

## 2018-08-02 DIAGNOSIS — J91.8 PLEURAL EFFUSION ASSOCIATED WITH PULMONARY INFECTION: ICD-10-CM

## 2018-08-02 DIAGNOSIS — J18.9 PLEURAL EFFUSION ASSOCIATED WITH PULMONARY INFECTION: ICD-10-CM

## 2018-08-02 PROCEDURE — 71250 CT THORAX DX C-: CPT

## 2018-08-06 DIAGNOSIS — Z12.9 MALIGNANT NEOPLASM SCREEN: Primary | ICD-10-CM

## 2018-08-06 NOTE — PROGRESS NOTES
Spoke with Pt on phone and relayed recent CT findings and the recommendation of further testing due to concern of pleural thickening where pleural neoplasm could not be ruled out. Pt understood and agreed to further testing with PET/CT.

## 2018-08-10 ENCOUNTER — TELEPHONE (OUTPATIENT)
Dept: FAMILY MEDICINE CLINIC | Age: 63
End: 2018-08-10

## 2018-08-10 NOTE — TELEPHONE ENCOUNTER
Per call from [facility], spoke with Leandro Malloy,    Peer to Peer needed on PET SCAN    Call [phone]      Thanks        appt is 8/15/18     Case closes 8/14/18

## 2018-08-13 NOTE — TELEPHONE ENCOUNTER
Spoke with insurance and they noted PET/CT would not be covered for current indications.  I will seek further information from radiologist regarding need for PET/CT

## 2018-08-14 NOTE — TELEPHONE ENCOUNTER
[facility] Coordination of Care calling and notes patient have appt 8/15/18 at 10:00 am.    Asking if you would like to do peer to peer, if so, please call [phone]  ( [employer])    PET SCAN WAS DENIED

## 2018-08-14 NOTE — TELEPHONE ENCOUNTER
Sandra Cruz with [facility] calling about PET SCAN scheduled for patient on tomorrow. She states that the dx code need to be more specific for insurance to cover. Insurance will not cover screenings. Kemal Berger states that this is happening a lot where doctors are not being specific with dx codes which triggers ABN and also Peer to peer. She states that if the correct code is used then a peer to peer may not even be needed.     Kemal Berger states to please call her if you have any questions and she can assist you with process     Call [phone]

## 2018-08-15 ENCOUNTER — TELEPHONE (OUTPATIENT)
Dept: FAMILY MEDICINE CLINIC | Age: 63
End: 2018-08-15

## 2018-08-15 NOTE — TELEPHONE ENCOUNTER
----- Message from Marianne Casiano sent at 8/15/2018  1:52 PM EDT -----  Regarding: /Telephone  Pt is calling to advised  that the insurance company denied his request for the Cat scan. Best contact number is [phone].

## 2018-08-17 NOTE — TELEPHONE ENCOUNTER
Dr. Crystal Velazquez / Telephone   Received: Today       Jamse Button [facility] Front Office                     Pt is returning a missed call.  Best contact: [phone]

## 2018-08-20 ENCOUNTER — TELEPHONE (OUTPATIENT)
Dept: FAMILY MEDICINE CLINIC | Age: 63
End: 2018-08-20

## 2018-08-20 NOTE — TELEPHONE ENCOUNTER
/Telephone  Received: Today       Juan Daniel MARTINEZ fp Front Office                     Pt needs a prescription for pin needle at [address] on [facility]. He also wanted to get the status of the cat scan.  Best contact number is [phone].

## 2018-08-20 NOTE — TELEPHONE ENCOUNTER
Patient returned call back into office. Patient was requesting for pen needles. Also states that Ms Martha Gonzales () is working on his CT Scan with his insurance. Patient also requested for an appointment with Dr Mayela Sepulveda due to SOB and breathing concerns. Patient was scheduled for an appointment on 8/22/18 at 3:00 PM. Patient verbalized understanding.

## 2018-08-21 RX ORDER — PEN NEEDLE, DIABETIC 29 G X1/2"
NEEDLE, DISPOSABLE MISCELLANEOUS
Qty: 30 PEN NEEDLE | Refills: 11 | Status: SHIPPED | OUTPATIENT
Start: 2018-08-21 | End: 2019-07-25 | Stop reason: SDUPTHER

## 2018-08-21 NOTE — TELEPHONE ENCOUNTER
Ani Lui with [facility]( Manager) coordinator care calling about PET SCAN.       Nurse Perri Ramirez took call

## 2018-08-22 ENCOUNTER — OFFICE VISIT (OUTPATIENT)
Dept: FAMILY MEDICINE CLINIC | Age: 63
End: 2018-08-22

## 2018-08-22 VITALS
HEIGHT: 76 IN | DIASTOLIC BLOOD PRESSURE: 82 MMHG | TEMPERATURE: 98.2 F | HEART RATE: 88 BPM | SYSTOLIC BLOOD PRESSURE: 132 MMHG | OXYGEN SATURATION: 96 % | BODY MASS INDEX: 28.86 KG/M2 | WEIGHT: 237 LBS | RESPIRATION RATE: 18 BRPM

## 2018-08-22 DIAGNOSIS — J44.1 COPD EXACERBATION (HCC): ICD-10-CM

## 2018-08-22 DIAGNOSIS — J41.0 SIMPLE CHRONIC BRONCHITIS (HCC): Primary | ICD-10-CM

## 2018-08-22 DIAGNOSIS — Z72.0 TOBACCO ABUSE: ICD-10-CM

## 2018-08-22 DIAGNOSIS — R93.89 ABNORMAL CHEST CT: ICD-10-CM

## 2018-08-22 RX ORDER — PREDNISONE 20 MG/1
TABLET ORAL
Qty: 12 TAB | Refills: 0 | Status: SHIPPED | OUTPATIENT
Start: 2018-08-22 | End: 2019-05-09 | Stop reason: ALTCHOICE

## 2018-08-22 RX ORDER — VARENICLINE TARTRATE 25 MG
0.5 KIT ORAL
Qty: 1 DOSE PACK | Refills: 0 | Status: SHIPPED | OUTPATIENT
Start: 2018-08-22 | End: 2019-10-02 | Stop reason: SDUPTHER

## 2018-08-22 NOTE — PROGRESS NOTES
Identified Patient with two Patient identifiers (Name and ). Two Patient Identifiers confirmed. Reviewed record in preparation for visit and have obtained necessary documentation. Chief Complaint   Patient presents with    Shortness of Breath     follow up       Visit Vitals    /82 (BP 1 Location: Right arm, BP Patient Position: Sitting)    Pulse 88    Temp 98.2 °F (36.8 °C) (Oral)    Resp 18    Ht 6' 4\" (1.93 m)    Wt 237 lb (107.5 kg)    SpO2 96%    BMI 28.85 kg/m2       1. Have you been to the ER, urgent care clinic since your last visit? Hospitalized since your last visit? No    2. Have you seen or consulted any other health care providers outside of the [address] [place] [address] since your last visit? Include any pap smears or colon screening.  No    PHQ9 - 10

## 2018-08-22 NOTE — TELEPHONE ENCOUNTER
I spoke with Isma Landrum and have discussed dx codes for possibly getting PET scan covered but due to this not being a confirmed neoplasm or solitary lesion seen on CT, insurance is likely to continue to deny scan. At this point the current diagnostic tx plan is yearly CT scan to monitor for changes.

## 2018-08-22 NOTE — PATIENT INSTRUCTIONS
Take Prednisone 40mg (2 pills) for 3 days. Then take Prednisone 20mg (1 pill) for 3 days. Then take Prednisone 10mg (1/2 pill) for 3 days. Varenicline (By mouth)   Varenicline ([id number]-[id number]-kleen)  Helps you quit smoking, as part of a support program.   Brand Name(s): Chantix, Chantix Starting Month Patel   There may be other brand names for this medicine. When This Medicine Should Not Be Used: This medicine is not right for everyone. Do not use it if you had an allergic reaction to varenicline. How to Use This Medicine:   Tablet  · Take your medicine as directed. Your dose or schedule may need to be changed to find what works best for you. · Tell your doctor what date you have set to stop smoking. This medicine needs to be started 1 week before that date. · It is best to take this medicine with a full glass of water after you eat. · This medicine should come with a Medication Guide. Ask your pharmacist for a copy if you do not have one. · Missed dose: Take a dose as soon as you remember. If it is almost time for your next dose, wait until then and take a regular dose. Do not take extra medicine to make up for a missed dose. · Store the medicine in a closed container at room temperature, away from heat, moisture, and direct light. Drugs and Foods to Avoid:   Ask your doctor or pharmacist before using any other medicine, including over-the-counter medicines, vitamins, and herbal products. · Some medicines can affect how varenicline works. Tell your doctor if you are using any of the following:  ¨ Insulin  ¨ Theophylline  ¨ Blood thinner (including warfarin)  · This medicine can affect your ability to tolerate alcohol. Limit the amount of alcohol that you drink until you know how this medicine affects you.   Warnings While Using This Medicine:   · Tell your doctor if you are pregnant or breastfeeding, or if you have kidney disease, heart or blood vessel problems, angina, or a history of heart attack, stroke, depression or mental health problems, or seizures. · For some people, this medicine may increase mental or emotional problems. This may lead to thoughts of suicide and violence. Talk with your doctor right away if you have any thought or behavior changes that concern you. Tell your doctor if you or anyone in your family has a history of bipolar disorder or suicide attempts. · This medicine may cause the following problems:  ¨ Increased risk of heart attack or stroke  ¨ Serious skin reactions  ¨ Sleepwalking  · This medicine may cause you to become dizzy or drowsy, or have trouble concentrating. Do not drive or do anything else that could be dangerous until you know this medicine affects you. · Your doctor will check your progress and the effects of this medicine at regular visits. Keep all appointments. · Keep all medicine out of the reach of children. Never share your medicine with anyone. Possible Side Effects While Using This Medicine:   Call your doctor right away if you notice any of these side effects:  · Allergic reaction: Itching or hives, swelling in your face or hands, swelling or tingling in your mouth or throat, chest tightness, trouble breathing  · Blistering, peeling, red skin rash  · Chest pain, fast, pounding, or uneven heartbeat  · Feeling anxious, depressed, restless, or irritable  · Numbness or weakness on one side of your body, sudden or severe headache, problems with vision, speech, or walking  · Seeing or hearing things that are not really there  · Seizures  · Thoughts of hurting yourself or others, unusual moods or behaviors  If you notice these less serious side effects, talk with your doctor:   · Headache  · Nausea, gas  · Trouble sleeping, unusual dreams, sleepwalking  If you notice other side effects that you think are caused by this medicine, tell your doctor. Call your doctor for medical advice about side effects.  You may report side effects to FDA at 8-339-OJN-8650  © 2017 [facility] [address] is for End User's use only and may not be sold, redistributed or otherwise used for commercial purposes. The above information is an  only. It is not intended as medical advice for individual conditions or treatments. Talk to your doctor, nurse or pharmacist before following any medical regimen to see if it is safe and effective for you.
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
## 2018-08-22 NOTE — MR AVS SNAPSHOT
[address] 
[phone] Patient: Joey Jerome MRN: [id number] FFS:[id number]/39/[id number] Visit Information Date & Time Provider Department Dept. Phone Encounter #  
 8/22/2018  3:00 PM William Ventura, 1515 [facility] [phone] [id number] Follow-up Instructions Return in about 4 weeks (around 9/19/2018) for Follow up. Your Appointments 9/18/2018  9:15 AM  
ACUTE CARE with William Ventura MD  
[address] [place] [address] [facility]: f/u on diabetes 9250 [facility] [address]  
[phone]  
  
   
 [address] [place] [address] [address] [address]  
  
    
 5/9/2019 11:40 AM  
ESTABLISHED PATIENT with Ulysses Chum, MD  
[facility] Appt Note: annual visit 320 [facility] 600 70 [facility]  
54 [facility] [address] [address] Upcoming Health Maintenance Date Due  
 EYE EXAM RETINAL OR DILATED Q1 4/24/2018 Influenza Age 5 to Adult 8/1/2018 FOOT EXAM Q1 10/5/2018 MICROALBUMIN Q1 10/5/2018 LIPID PANEL Q1 10/5/2018 HEMOGLOBIN A1C Q6M 10/18/2018 DTaP/Tdap/Td series (2 - Td) 9/15/2022 COLONOSCOPY 4/25/2026 Allergies as of 8/22/2018  Review Complete On: 8/22/2018 By: Tori Severs, LPN No Known Allergies Current Immunizations  Reviewed on 6/3/2018 No immunizations on file. Not reviewed this visit You Were Diagnosed With   
  
 Codes Comments Simple chronic bronchitis ([facility] [address]    -  Primary ICD-10-CM: J41.0 ICD-9-CM: 491.0 Tobacco abuse     ICD-10-CM: Z72.0 ICD-9-CM: 305.1 Abnormal chest CT     ICD-10-CM: R93.8 ICD-9-CM: 793.2 COPD exacerbation ([facility] [address]     ICD-10-CM: J44.1 ICD-9-CM: 491.21 Vitals BP Pulse Temp Resp Height(growth percentile) Weight(growth percentile) 132/82 (BP 1 Location: Right arm, BP Patient Position: Sitting) 88 98.2 °F (36.8 °C) (Oral) 18 6' 4\" (1.93 m) 237 lb (107.5 kg) SpO2 BMI Smoking Status 96% 28.85 kg/m2 Former Smoker Vitals History BMI and BSA Data Body Mass Index Body Surface Area  
 28.85 kg/m 2 2.4 m 2 Preferred Pharmacy Pharmacy Name Phone [facility] [address], [address] [phone] Your Updated Medication List  
  
   
This list is accurate as of 8/22/18  3:53 PM.  Always use your most recent med list.  
  
  
  
  
 albuterol 90 mcg/actuation inhaler Commonly known as:  PROVENTIL HFA, VENTOLIN HFA, PROAIR HFA Take 2 Puffs by inhalation every four (4) hours as needed for Wheezing. BASAGLAR KWIKPEN U-100 INSULIN 100 unit/mL (3 mL) Inpn Generic drug:  insulin glargine INJECT 33 UNITS SUBCUTANEOUSLY ONCE DAILY AT  NIGHT BYDUREON 2 mg/0.65 mL Pnij Generic drug:  exenatide microspheres  
  
 fluticasone-salmeterol 250-50 mcg/dose diskus inhaler Commonly known as:  ADVAIR Take 1 Puff by inhalation every twelve (12) hours. gabapentin 600 mg tablet Commonly known as:  NEURONTIN Take  by mouth two (2) times a day. glipiZIDE 10 mg tablet Commonly known as:  GLUCOTROL  
TAKE ONE TABLET BY MOUTH TWICE DAILY Insulin Needles (Disposable) 31 gauge x 1/4\" Ndle Commonly known as:  ULTICARE PEN NEEDLE  
USE ONE PEN NEEDLE DAILY Lancets Misc Check blood glucose daily  
  
 lisinopril-hydroCHLOROthiazide 20-12.5 mg per tablet Commonly known as:  PRINZIDE, ZESTORETIC  
TAKE ONE TABLET BY MOUTH ONCE DAILY  
  
 metFORMIN 1,000 mg tablet Commonly known as:  GLUCOPHAGE  
TAKE ONE TABLET BY MOUTH TWICE DAILY WITH MEALS  
  
 oxyCODONE-acetaminophen 5-325 mg per tablet Commonly known as:  PERCOCET  
  
 predniSONE 20 mg tablet Commonly known as:  [place] Take 40mg for 3 days, then 20mg for 3 days, then 10mg for 3 days. tiotropium 18 mcg inhalation capsule Commonly known as:  [address] Take 1 Cap by inhalation daily. Indications: BRONCHOSPASM PREVENTION WITH COPD  
  
 TRUE METRIX GLUCOSE METER Misc Generic drug:  Blood-Glucose Meter USE TO CHECK GLUCOSE ONCE DAILY * TRUETEST TEST STRIPS strip Generic drug:  glucose blood VI test strips CHECK BLOOD GLUCOSE FASTING ONCE DAILY * TRUETEST TEST STRIPS strip Generic drug:  glucose blood VI test strips USE AS DIRECTED TO TEST BLOOD SUGAR ONCE DAILY * TRUE METRIX GLUCOSE TEST STRIP strip Generic drug:  glucose blood VI test strips USE 1 STRIP TO CHECK GLUCOSE ONCE DAILY  
  
 varenicline 0.5 mg (11)- 1 mg (42) Dspk Commonly known as:  CHANTIX STARTER PAUL Take 0.5 mg by mouth two (2) times daily (after meals). * Notice: This list has 3 medication(s) that are the same as other medications prescribed for you. Read the directions carefully, and ask your doctor or other care provider to review them with you. Prescriptions Sent to Pharmacy Refills  
 varenicline (CHANTIX STARTER PAUL) 0.5 mg (11)- 1 mg (42) DsPk 0 Sig: Take 0.5 mg by mouth two (2) times daily (after meals). Class: Normal  
 Pharmacy: [address] [place] [address] Ph #: [phone] Route: Oral  
 predniSONE (DELTASONE) 20 mg tablet 0 Sig: Take 40mg for 3 days, then 20mg for 3 days, then 10mg for 3 days. Class: Normal  
 Pharmacy: [address] [place] [address] Ph #: [phone] We Performed the Following REFERRAL TO PULMONARY DISEASE [QAV03 Custom] Comments:  
 Abnormal chest CT. Poorly controlled COPD. Follow-up Instructions Return in about 4 weeks (around 9/19/2018) for Follow up. Referral Information Referral ID Referred By Referred To  
  
 [id number] Aliciachris Rivera [facility] [address] Visits Status Start Date End Date 1 New Request 8/22/18 8/22/19 If your referral has a status of pending review or denied, additional information will be sent to support the outcome of this decision. Patient Instructions Take Prednisone 40mg (2 pills) for 3 days. Then take Prednisone 20mg (1 pill) for 3 days. Then take Prednisone 10mg (1/2 pill) for 3 days. Varenicline (By mouth) Varenicline (MarriSecure-24) Helps you quit smoking, as part of a support program.  
Brand Name(s): Chantix, Chantix Starting Month Pak There may be other brand names for this medicine. When This Medicine Should Not Be Used: This medicine is not right for everyone. Do not use it if you had an allergic reaction to varenicline. How to Use This Medicine:  
Tablet · Take your medicine as directed. Your dose or schedule may need to be changed to find what works best for you. · Tell your doctor what date you have set to stop smoking. This medicine needs to be started 1 week before that date. · It is best to take this medicine with a full glass of water after you eat. · This medicine should come with a Medication Guide. Ask your pharmacist for a copy if you do not have one. · Missed dose: Take a dose as soon as you remember. If it is almost time for your next dose, wait until then and take a regular dose. Do not take extra medicine to make up for a missed dose. · Store the medicine in a closed container at room temperature, away from heat, moisture, and direct light. Drugs and Foods to Avoid: Ask your doctor or pharmacist before using any other medicine, including over-the-counter medicines, vitamins, and herbal products. · Some medicines can affect how varenicline works. Tell your doctor if you are using any of the following: ¨ Insulin ¨ Theophylline ¨ Blood thinner (including warfarin) · This medicine can affect your ability to tolerate alcohol. Limit the amount of alcohol that you drink until you know how this medicine affects you. Warnings While Using This Medicine: · Tell your doctor if you are pregnant or breastfeeding, or if you have kidney disease, heart or blood vessel problems, angina, or a history of heart attack, stroke, depression or mental health problems, or seizures. · For some people, this medicine may increase mental or emotional problems. This may lead to thoughts of suicide and violence. Talk with your doctor right away if you have any thought or behavior changes that concern you. Tell your doctor if you or anyone in your family has a history of bipolar disorder or suicide attempts. · This medicine may cause the following problems: 
¨ Increased risk of heart attack or stroke ¨ Serious skin reactions ¨ Sleepwalking · This medicine may cause you to become dizzy or drowsy, or have trouble concentrating. Do not drive or do anything else that could be dangerous until you know this medicine affects you. · Your doctor will check your progress and the effects of this medicine at regular visits. Keep all appointments. · Keep all medicine out of the reach of children. Never share your medicine with anyone. Possible Side Effects While Using This Medicine:  
Call your doctor right away if you notice any of these side effects: · Allergic reaction: Itching or hives, swelling in your face or hands, swelling or tingling in your mouth or throat, chest tightness, trouble breathing · Blistering, peeling, red skin rash · Chest pain, fast, pounding, or uneven heartbeat · Feeling anxious, depressed, restless, or irritable · Numbness or weakness on one side of your body, sudden or severe headache, problems with vision, speech, or walking · Seeing or hearing things that are not really there · Seizures · Thoughts of hurting yourself or others, unusual moods or behaviors If you notice these less serious side effects, talk with your doctor:  
· Headache · Nausea, gas · Trouble sleeping, unusual dreams, sleepwalking If you notice other side effects that you think are caused by this medicine, tell your doctor. Call your doctor for medical advice about side effects. You may report side effects to FDA at 1-099-KLV-8285 © 2017 [address] Information is for End User's use only and may not be sold, redistributed or otherwise used for commercial purposes. The above information is an  only. It is not intended as medical advice for individual conditions or treatments. Talk to your doctor, nurse or pharmacist before following any medical regimen to see if it is safe and effective for you. Introducing [facility]! Hortensia Fenton introduces [employer] patient portal. Now you can access parts of your medical record, email your doctor's office, and request medication refills online. 1. In your internet browser, go to [email or web address] "[email or web address]"/[email or web address] 2. Click on the First Time User? Click Here link in the Sign In box. You will see the New Member Sign Up page. 3. Enter your [employer] Access Code exactly as it appears below. You will not need to use this code after youve completed the sign-up process. If you do not sign up before the expiration date, you must request a new code. · [employer] Access Code: [email or web address] Expires: 10/29/2018 11:02 AM 
 
4. Enter the last four digits of your Social Security Number (xxxx) and Date of Birth (mm/dd/yyyy) as indicated and click Submit. You will be taken to the next sign-up page. 5. Create a [employer] ID. This will be your [employer] login ID and cannot be changed, so think of one that is secure and easy to remember. 6. Create a [employer] password. You can change your password at any time. 7. Enter your Password Reset Question and Answer. This can be used at a later time if you forget your password. 8. Enter your e-mail address. You will receive e-mail notification when new information is available in [address] 9. Click Sign Up. You can now view and download portions of your medical record. 10. Click the Download Summary menu link to download a portable copy of your medical information. If you have questions, please visit the Frequently Asked Questions section of the [employer] website. Remember, [employer] is NOT to be used for urgent needs. For medical emergencies, dial 911. Now available from your iPhone and Android! Please provide this summary of care documentation to your next provider. Your primary care clinician is listed as Jose Luis Velásquez. If you have any questions after today's visit, please call [phone].

## 2018-09-12 ENCOUNTER — TELEPHONE (OUTPATIENT)
Dept: FAMILY MEDICINE CLINIC | Age: 63
End: 2018-09-12

## 2018-09-12 NOTE — TELEPHONE ENCOUNTER
----- Message from [employer] sent at 9/12/2018 11:43 AM EDT -----  Regarding: Dr. Fe Rodriguez  Pt is requesting a call back in reference to a referral to pulmonologist (unknown name & number to pt). Best contact [phone].

## 2018-09-12 NOTE — TELEPHONE ENCOUNTER
Returned call and spoke with patient. Information below was given and he was asked to call back to the office with appt date.

## 2018-09-12 NOTE — TELEPHONE ENCOUNTER
Patient called back and states he will be seeing Dr. Oneal Valentino? Patient didn't have first name when asked. Patient states Dr. Kenyetta Burton didn't have any sooner appt and that Dr. Oneal Valentino is in same network.     appt date 10/22/18 at 10:30 am   1 [facility] [phone]    thanks

## 2018-09-12 NOTE — TELEPHONE ENCOUNTER
[id number] - REFERRAL TO PULMONARY DISEASE  Yared Arriaga MD 1 1      Diagnosis Information   Diagnosis   J41.0 (ICD-10-CM) - Simple chronic bronchitis (HCC)   R93.8 (ICD-10-CM) - Abnormal chest CT        Yared Arriaga    Physician           Specialty Languages     Pulmonary Disease ENGLISH       Primary Contact Information      Phone Fax E-mail Address     [phone]

## 2018-09-18 ENCOUNTER — OFFICE VISIT (OUTPATIENT)
Dept: FAMILY MEDICINE CLINIC | Age: 63
End: 2018-09-18

## 2018-09-18 VITALS
SYSTOLIC BLOOD PRESSURE: 132 MMHG | DIASTOLIC BLOOD PRESSURE: 76 MMHG | RESPIRATION RATE: 20 BRPM | TEMPERATURE: 98.3 F | WEIGHT: 237 LBS | BODY MASS INDEX: 28.86 KG/M2 | HEART RATE: 87 BPM | OXYGEN SATURATION: 92 % | HEIGHT: 76 IN

## 2018-09-18 DIAGNOSIS — Z72.0 TOBACCO ABUSE: ICD-10-CM

## 2018-09-18 DIAGNOSIS — E11.40 TYPE 2 DIABETES MELLITUS WITH DIABETIC NEUROPATHY, WITH LONG-TERM CURRENT USE OF INSULIN (HCC): Primary | ICD-10-CM

## 2018-09-18 DIAGNOSIS — Z79.4 TYPE 2 DIABETES MELLITUS WITH DIABETIC NEUROPATHY, WITH LONG-TERM CURRENT USE OF INSULIN (HCC): Primary | ICD-10-CM

## 2018-09-18 DIAGNOSIS — J41.1 MUCOPURULENT CHRONIC BRONCHITIS (HCC): ICD-10-CM

## 2018-09-18 DIAGNOSIS — I10 ESSENTIAL HYPERTENSION: ICD-10-CM

## 2018-09-18 RX ORDER — GLIPIZIDE 10 MG/1
10 TABLET ORAL DAILY
Qty: 90 TAB | Refills: 3
Start: 2018-09-18 | End: 2018-11-26 | Stop reason: SDUPTHER

## 2018-09-18 NOTE — PATIENT INSTRUCTIONS
Eye Doctor Referral:  
 
[facility] Ophthalmology [facility] [address], [place], [address] Phone: [phone] [employer] 
(Numerous locations; see web site) [address] [facility] [place], [address] [facility] 
[phone] [address]ww[address] [employer] [address] Rod Lynn at [place] [address] Appointment time: 10:00 AM Please arrived at 9:30 AM
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
## 2018-09-18 NOTE — MR AVS SNAPSHOT
[address] [facility] [address] [place] [address] 
[phone] Patient: Ann Leger MRN: [id number] LPB:[birth year] Visit Information Date & Time Provider Department Dept. Phone Encounter #  
 9/18/2018  9:15 AM Radames Amaya [facility] [place] [phone] [id number] Follow-up Instructions Return in about 3 months (around 12/18/2018) for Follow up. Your Appointments 5/9/2019 11:40 AM  
ESTABLISHED PATIENT with River Dawson MD  
[facility]) Appt Note: annual visit Marilyn Rickettskatie [facility] [address] [facility]  
[address] [facility] [address] [address] Upcoming Health Maintenance Date Due  
 EYE EXAM RETINAL OR DILATED Q1 4/24/2018 FOOT EXAM Q1 10/5/2018 MICROALBUMIN Q1 10/5/2018 LIPID PANEL Q1 10/5/2018 HEMOGLOBIN A1C Q6M 10/18/2018 Influenza Age 5 to Adult 9/18/2019* DTaP/Tdap/Td series (2 - Td) 9/15/2022 COLONOSCOPY 4/25/2026 *Topic was postponed. The date shown is not the original due date. Allergies as of 9/18/2018  Review Complete On: 9/18/2018 By: David Streeter No Known Allergies Current Immunizations  Reviewed on 6/3/2018 No immunizations on file. Not reviewed this visit You Were Diagnosed With   
  
 Codes Comments Type 2 diabetes mellitus with diabetic neuropathy, with long-term current use of insulin (HCC)    -  Primary ICD-10-CM: E11.40, Z79.4 ICD-9-CM: 250.60, 357.2, V58.67 Mucopurulent chronic bronchitis ([facility] [address]     ICD-10-CM: J41.1 ICD-9-CM: 491.1 Tobacco abuse     ICD-10-CM: Z72.0 ICD-9-CM: 305.1 Essential hypertension     ICD-10-CM: I10 
ICD-9-CM: 401.9 Vitals BP Pulse Temp Resp Height(growth percentile) Weight(growth percentile)  132/76 (BP 1 Location: Left arm, BP Patient Position: Sitting) 87 98.3 °F (36.8 °C) (Oral) 20 6' 4\" (1.93 m) 237 lb (107.5 kg) SpO2 BMI Smoking Status 92% 28.85 kg/m2 Current Some Day Smoker Vitals History BMI and BSA Data Body Mass Index Body Surface Area  
 28.85 kg/m 2 2.4 m 2 Preferred Pharmacy Pharmacy Name Phone [facility] [address] [phone] Your Updated Medication List  
  
   
This list is accurate as of 9/18/18 10:35 AM.  Always use your most recent med list.  
  
  
  
  
 albuterol 90 mcg/actuation inhaler Commonly known as:  PROVENTIL HFA, VENTOLIN HFA, PROAIR HFA Take 2 Puffs by inhalation every four (4) hours as needed for Wheezing. BASAGLAR KWIKPEN U-100 INSULIN 100 unit/mL (3 mL) Inpn Generic drug:  insulin glargine INJECT 33 UNITS SUBCUTANEOUSLY ONCE DAILY AT  NIGHT BYDUREON 2 mg/0.65 mL Pnij Generic drug:  exenatide microspheres  
  
 fluticasone-salmeterol 115-21 mcg/actuation inhaler Commonly known as:  ADVAIR HFA Take 2 Puffs by inhalation two (2) times a day.  
  
 gabapentin 600 mg tablet Commonly known as:  NEURONTIN Take  by mouth two (2) times a day. glipiZIDE 10 mg tablet Commonly known as:  GLUCOTROL  
TAKE ONE TABLET BY MOUTH TWICE DAILY Insulin Needles (Disposable) 31 gauge x 1/4\" Ndle Commonly known as:  ULTICARE PEN NEEDLE  
USE ONE PEN NEEDLE DAILY Lancets Misc Check blood glucose daily  
  
 lisinopril-hydroCHLOROthiazide 20-12.5 mg per tablet Commonly known as:  PRINZIDE, ZESTORETIC  
TAKE ONE TABLET BY MOUTH ONCE DAILY  
  
 metFORMIN 1,000 mg tablet Commonly known as:  GLUCOPHAGE  
TAKE ONE TABLET BY MOUTH TWICE DAILY WITH MEALS  
  
 oxyCODONE-acetaminophen 5-325 mg per tablet Commonly known as:  PERCOCET  
  
 predniSONE 20 mg tablet Commonly known as:  Lincoln Douglas Take 40mg for 3 days, then 20mg for 3 days, then 10mg for 3 days. tiotropium 18 mcg inhalation capsule Commonly known as:  [address] [place] [address] Drive Take 1 Cap by inhalation daily. Indications: BRONCHOSPASM PREVENTION WITH COPD  
  
 TRUE METRIX GLUCOSE METER Misc Generic drug:  Blood-Glucose Meter USE TO CHECK GLUCOSE ONCE DAILY * TRUETEST TEST STRIPS strip Generic drug:  glucose blood VI test strips CHECK BLOOD GLUCOSE FASTING ONCE DAILY * TRUETEST TEST STRIPS strip Generic drug:  glucose blood VI test strips USE AS DIRECTED TO TEST BLOOD SUGAR ONCE DAILY * TRUE METRIX GLUCOSE TEST STRIP strip Generic drug:  glucose blood VI test strips USE 1 STRIP TO CHECK GLUCOSE ONCE DAILY  
  
 varenicline 0.5 mg (11)- 1 mg (42) Dspk Commonly known as:  CHANTIX STARTER PAUL Take 0.5 mg by mouth two (2) times daily (after meals). * Notice: This list has 3 medication(s) that are the same as other medications prescribed for you. Read the directions carefully, and ask your doctor or other care provider to review them with you. Prescriptions Sent to Pharmacy Refills  
 fluticasone-salmeterol (ADVAIR HFA) 115-21 mcg/actuation inhaler 1 Sig: Take 2 Puffs by inhalation two (2) times a day. Class: Normal  
 Pharmacy: [address] [facility] #: [phone] Route: Inhalation We Performed the Following AMB POC URINE, MICROALBUMIN, SEMIQUANT (3 RESULTS) [01265 CPT(R)] CBC W/O DIFF [58535 CPT(R)] HEMOGLOBIN A1C WITH EAG [30399 CPT(R)]  DIABETES FOOT EXAM [HM7 Custom] LIPID PANEL [83519 CPT(R)] METABOLIC PANEL, BASIC [98422 CPT(R)] REFERRAL TO OPHTHALMOLOGY [REF57 Custom] Comments:  
 Diabetic eye exam.  
  
Follow-up Instructions Return in about 3 months (around 12/18/2018) for Follow up. Referral Information Referral ID Referred By Referred To  
  
 [id number] Kyle ANNA Not Available Visits Status Start Date End Date 1 New Request 9/18/18 9/18/19 If your referral has a status of pending review or denied, additional information will be sent to support the outcome of this decision. Patient Instructions Eye Doctor Referral:  
 
[facility] Ophthalmology Reid Northern DO   
[address], [place], [address] Phone: [phone] [facility] 
(Numerous locations; see web site) [address] [facility] [address], [address] [place], [address] [facility] 
[phone] [address] [employer] [address] Jazmin Morgan at [place] [address] Appointment time: 10:00 AM Please arrived at 9:30 AM 
 
 
 
 
 
  
Introducing [facility] & [facility] SERVICES! [employer] introduces watAgame patient portal. Now you can access parts of your medical record, email your doctor's office, and request medication refills online. 1. In your internet browser, go to [email or web address] 2. Click on the First Time User? Click Here link in the Sign In box. You will see the New Member Sign Up page. 3. Enter your watAgame Access Code exactly as it appears below. You will not need to use this code after youve completed the sign-up process. If you do not sign up before the expiration date, you must request a new code. · watAgame Access Code: [email or web address] Expires: 10/29/2018 11:02 AM 
 
4. Enter the last four digits of your Social Security Number (xxxx) and Date of Birth (mm/dd/yyyy) as indicated and click Submit. You will be taken to the next sign-up page. 5. Create a [employer] ID. This will be your watAgame login ID and cannot be changed, so think of one that is secure and easy to remember. 6. Create a watAgame password. You can change your password at any time. 7. Enter your Password Reset Question and Answer. This can be used at a later time if you forget your password. 8. Enter your e-mail address. You will receive e-mail notification when new information is available in [address] 9. Click Sign Up. You can now view and download portions of your medical record. 10. Click the Download Summary menu link to download a portable copy of your medical information. If you have questions, please visit the Frequently Asked Questions section of the [facility] website. Remember, [facility] is NOT to be used for urgent needs. For medical emergencies, dial 911. Now available from your iPhone and Android! Please provide this summary of care documentation to your next provider. Your primary care clinician is listed as Andrzej West. If you have any questions after today's visit, please call [phone].

## 2018-09-18 NOTE — Clinical Note
Can you please make a follow up appointment for Mr. Josiah Ferrer in 2 weeks? I put 3 months on his papers but I think that is way too far out, sunny since he cannot get into Pulm until next month. Please keep me posted on the inhaler.

## 2018-09-19 ENCOUNTER — TELEPHONE (OUTPATIENT)
Dept: FAMILY MEDICINE CLINIC | Age: 63
End: 2018-09-19

## 2018-09-20 LAB
BUN SERPL-MCNC: 14 MG/DL (ref 8–27)
BUN/CREAT SERPL: 14 (ref 10–24)
CALCIUM SERPL-MCNC: 9.2 MG/DL (ref 8.6–10.2)
CHLORIDE SERPL-SCNC: 97 MMOL/L (ref 96–106)
CHOLEST SERPL-MCNC: 144 MG/DL (ref 100–199)
CO2 SERPL-SCNC: 24 MMOL/L (ref 20–29)
CREAT SERPL-MCNC: 0.97 MG/DL (ref 0.76–1.27)
ERYTHROCYTE [DISTWIDTH] IN BLOOD BY AUTOMATED COUNT: 13 % (ref 12.3–15.4)
EST. AVERAGE GLUCOSE BLD GHB EST-MCNC: 220 MG/DL
GLUCOSE SERPL-MCNC: 342 MG/DL (ref 65–99)
HBA1C MFR BLD: 9.3 % (ref 4.8–5.6)
HCT VFR BLD AUTO: 40.6 % (ref 37.5–51)
HDLC SERPL-MCNC: 55 MG/DL
HGB BLD-MCNC: 14.3 G/DL (ref 13–17.7)
INTERPRETATION, 910389: NORMAL
LDLC SERPL CALC-MCNC: 58 MG/DL (ref 0–99)
Lab: NORMAL
MCH RBC QN AUTO: 29.5 PG (ref 26.6–33)
MCHC RBC AUTO-ENTMCNC: 35.2 G/DL (ref 31.5–35.7)
MCV RBC AUTO: 84 FL (ref 79–97)
PLATELET # BLD AUTO: 217 X10E3/UL (ref 150–379)
POTASSIUM SERPL-SCNC: 4.5 MMOL/L (ref 3.5–5.2)
RBC # BLD AUTO: 4.85 X10E6/UL (ref 4.14–5.8)
SODIUM SERPL-SCNC: 138 MMOL/L (ref 134–144)
TRIGL SERPL-MCNC: 157 MG/DL (ref 0–149)
VLDLC SERPL CALC-MCNC: 31 MG/DL (ref 5–40)
WBC # BLD AUTO: 6.8 X10E3/UL (ref 3.4–10.8)

## 2018-09-20 NOTE — PROGRESS NOTES
Glucose worse A1c now worse as well Pt has had multiple steroid courses for COPD Please call and notify of A1c result PLEASE have patient bring in BG log with fasting and pre-meal blood sugars OK to keep scheduled 10/2 appt

## 2018-09-21 ENCOUNTER — HOSPITAL ENCOUNTER (EMERGENCY)
Age: 63
Discharge: HOME | End: 2018-09-21
Attending: EMERGENCY MEDICINE
Payer: MEDICAID

## 2018-09-21 ENCOUNTER — TELEPHONE (OUTPATIENT)
Dept: FAMILY MEDICINE CLINIC | Age: 63
End: 2018-09-21

## 2018-09-21 ENCOUNTER — APPOINTMENT (OUTPATIENT)
Dept: GENERAL RADIOLOGY | Age: 63
End: 2018-09-21
Attending: EMERGENCY MEDICINE
Payer: MEDICAID

## 2018-09-21 VITALS
BODY MASS INDEX: 28.86 KG/M2 | RESPIRATION RATE: 18 BRPM | DIASTOLIC BLOOD PRESSURE: 96 MMHG | WEIGHT: 237 LBS | SYSTOLIC BLOOD PRESSURE: 158 MMHG | HEART RATE: 85 BPM | OXYGEN SATURATION: 93 % | TEMPERATURE: 98.1 F | HEIGHT: 76 IN

## 2018-09-21 DIAGNOSIS — Z87.891 HISTORY OF TOBACCO ABUSE: ICD-10-CM

## 2018-09-21 DIAGNOSIS — J44.1 COPD EXACERBATION (HCC): Primary | ICD-10-CM

## 2018-09-21 LAB
ANION GAP SERPL CALC-SCNC: 6 MMOL/L (ref 5–15)
BASOPHILS # BLD: 0.1 K/UL (ref 0–0.1)
BASOPHILS NFR BLD: 1 % (ref 0–1)
BNP SERPL-MCNC: 223 PG/ML (ref 0–125)
BUN SERPL-MCNC: 13 MG/DL (ref 6–20)
BUN/CREAT SERPL: 14 (ref 12–20)
CALCIUM SERPL-MCNC: 9.2 MG/DL (ref 8.5–10.1)
CHLORIDE SERPL-SCNC: 100 MMOL/L (ref 97–108)
CO2 SERPL-SCNC: 29 MMOL/L (ref 21–32)
CREAT SERPL-MCNC: 0.94 MG/DL (ref 0.7–1.3)
DIFFERENTIAL METHOD BLD: ABNORMAL
EOSINOPHIL # BLD: 0.9 K/UL (ref 0–0.4)
EOSINOPHIL NFR BLD: 11 % (ref 0–7)
ERYTHROCYTE [DISTWIDTH] IN BLOOD BY AUTOMATED COUNT: 11.9 % (ref 11.5–14.5)
GLUCOSE SERPL-MCNC: 191 MG/DL (ref 65–100)
HCT VFR BLD AUTO: 44.2 % (ref 36.6–50.3)
HGB BLD-MCNC: 15.1 G/DL (ref 12.1–17)
IMM GRANULOCYTES # BLD: 0 K/UL (ref 0–0.04)
IMM GRANULOCYTES NFR BLD AUTO: 0 % (ref 0–0.5)
LYMPHOCYTES # BLD: 2.1 K/UL (ref 0.8–3.5)
LYMPHOCYTES NFR BLD: 27 % (ref 12–49)
MAGNESIUM SERPL-MCNC: 1.9 MG/DL (ref 1.6–2.4)
MCH RBC QN AUTO: 29 PG (ref 26–34)
MCHC RBC AUTO-ENTMCNC: 34.2 G/DL (ref 30–36.5)
MCV RBC AUTO: 84.8 FL (ref 80–99)
MONOCYTES # BLD: 0.5 K/UL (ref 0–1)
MONOCYTES NFR BLD: 7 % (ref 5–13)
NEUTS SEG # BLD: 4.2 K/UL (ref 1.8–8)
NEUTS SEG NFR BLD: 54 % (ref 32–75)
NRBC # BLD: 0 K/UL (ref 0–0.01)
NRBC BLD-RTO: 0 PER 100 WBC
PLATELET # BLD AUTO: 221 K/UL (ref 150–400)
PMV BLD AUTO: 9.9 FL (ref 8.9–12.9)
POTASSIUM SERPL-SCNC: 3.8 MMOL/L (ref 3.5–5.1)
RBC # BLD AUTO: 5.21 M/UL (ref 4.1–5.7)
SODIUM SERPL-SCNC: 135 MMOL/L (ref 136–145)
TROPONIN I SERPL-MCNC: <0.05 NG/ML
WBC # BLD AUTO: 7.8 K/UL (ref 4.1–11.1)

## 2018-09-21 PROCEDURE — 74011636637 HC RX REV CODE- 636/637: Performed by: EMERGENCY MEDICINE

## 2018-09-21 PROCEDURE — 84484 ASSAY OF TROPONIN QUANT: CPT | Performed by: EMERGENCY MEDICINE

## 2018-09-21 PROCEDURE — 83880 ASSAY OF NATRIURETIC PEPTIDE: CPT | Performed by: EMERGENCY MEDICINE

## 2018-09-21 PROCEDURE — 71046 X-RAY EXAM CHEST 2 VIEWS: CPT

## 2018-09-21 PROCEDURE — 99283 EMERGENCY DEPT VISIT LOW MDM: CPT

## 2018-09-21 PROCEDURE — 74011000250 HC RX REV CODE- 250: Performed by: NURSE PRACTITIONER

## 2018-09-21 PROCEDURE — 80048 BASIC METABOLIC PNL TOTAL CA: CPT | Performed by: EMERGENCY MEDICINE

## 2018-09-21 PROCEDURE — 77030013140 HC MSK NEB VYRM -A

## 2018-09-21 PROCEDURE — 74011000250 HC RX REV CODE- 250: Performed by: EMERGENCY MEDICINE

## 2018-09-21 PROCEDURE — 36415 COLL VENOUS BLD VENIPUNCTURE: CPT | Performed by: EMERGENCY MEDICINE

## 2018-09-21 PROCEDURE — 83735 ASSAY OF MAGNESIUM: CPT | Performed by: EMERGENCY MEDICINE

## 2018-09-21 PROCEDURE — 77030029684 HC NEB SM VOL KT MONA -A

## 2018-09-21 PROCEDURE — 94640 AIRWAY INHALATION TREATMENT: CPT

## 2018-09-21 PROCEDURE — 85025 COMPLETE CBC W/AUTO DIFF WBC: CPT | Performed by: EMERGENCY MEDICINE

## 2018-09-21 PROCEDURE — 93005 ELECTROCARDIOGRAM TRACING: CPT

## 2018-09-21 RX ORDER — ALBUTEROL SULFATE 0.83 MG/ML
5 SOLUTION RESPIRATORY (INHALATION)
Status: COMPLETED | OUTPATIENT
Start: 2018-09-21 | End: 2018-09-21

## 2018-09-21 RX ORDER — ALBUTEROL SULFATE 0.83 MG/ML
2.5 SOLUTION RESPIRATORY (INHALATION)
Status: COMPLETED | OUTPATIENT
Start: 2018-09-21 | End: 2018-09-21

## 2018-09-21 RX ORDER — NEBULIZER AND COMPRESSOR
1 EACH MISCELLANEOUS
Qty: 1 EACH | Refills: 0 | Status: SHIPPED | OUTPATIENT
Start: 2018-09-21

## 2018-09-21 RX ORDER — IPRATROPIUM BROMIDE AND ALBUTEROL SULFATE 2.5; .5 MG/3ML; MG/3ML
3 SOLUTION RESPIRATORY (INHALATION)
Qty: 30 NEBULE | Refills: 0 | Status: SHIPPED | OUTPATIENT
Start: 2018-09-21 | End: 2021-11-03

## 2018-09-21 RX ORDER — PREDNISONE 20 MG/1
60 TABLET ORAL
Status: COMPLETED | OUTPATIENT
Start: 2018-09-21 | End: 2018-09-21

## 2018-09-21 RX ORDER — IPRATROPIUM BROMIDE AND ALBUTEROL SULFATE 2.5; .5 MG/3ML; MG/3ML
3 SOLUTION RESPIRATORY (INHALATION)
Status: COMPLETED | OUTPATIENT
Start: 2018-09-21 | End: 2018-09-21

## 2018-09-21 RX ORDER — PREDNISONE 10 MG/1
TABLET ORAL
Qty: 21 TAB | Refills: 0 | Status: SHIPPED | OUTPATIENT
Start: 2018-09-21 | End: 2019-05-09 | Stop reason: ALTCHOICE

## 2018-09-21 RX ORDER — MONTELUKAST SODIUM 10 MG/1
10 TABLET ORAL DAILY
Qty: 30 TAB | Refills: 0 | Status: SHIPPED | OUTPATIENT
Start: 2018-09-21 | End: 2019-05-09

## 2018-09-21 RX ADMIN — ALBUTEROL SULFATE 5 MG: 2.5 SOLUTION RESPIRATORY (INHALATION) at 20:42

## 2018-09-21 RX ADMIN — PREDNISONE 60 MG: 20 TABLET ORAL at 19:36

## 2018-09-21 RX ADMIN — ALBUTEROL SULFATE 2.5 MG: 2.5 SOLUTION RESPIRATORY (INHALATION) at 21:01

## 2018-09-21 RX ADMIN — IPRATROPIUM BROMIDE AND ALBUTEROL SULFATE 3 ML: .5; 3 SOLUTION RESPIRATORY (INHALATION) at 19:58

## 2018-09-21 RX ADMIN — ALBUTEROL SULFATE 1 DOSE: 2.5 SOLUTION RESPIRATORY (INHALATION) at 19:36
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
## 2018-09-21 NOTE — TELEPHONE ENCOUNTER
Patient called in to say he received message in regards to Advair inhaler and he was headed to the hospital due to difficulty breathing. While patient on phone you could hear wheezing in between his words. Informed patient that I will give him a call on Monday 9/24/18 in regards to his lab results. Patient verbalized understanding.

## 2018-09-21 NOTE — TELEPHONE ENCOUNTER
Attempted to call patient in regards to approval for advair inhaler. Unable to reach patient LVM to call the office.

## 2018-09-21 NOTE — ED TRIAGE NOTES
Pt c/o SOB that started 1 month ago, worse since this week. +PERES and cough. Pt states he has been unable to lie flat.

## 2018-09-22 NOTE — ED NOTES
I have reviewed discharge instructions with the patient. The patient verbalized understanding. The patient was able to ambulate to the exit with a steady gait and did not appear to be in any distress.

## 2018-09-22 NOTE — DISCHARGE INSTRUCTIONS
COPD Exacerbation Plan: Care Instructions  Your Care Instructions    If you have chronic obstructive pulmonary disease (COPD), your usual shortness of breath could suddenly get worse. You may start coughing more and have more mucus. This flare-up is called a COPD exacerbation (say \"iz-JSM-he-BAY-deisy\"). A lung infection or air pollution could set off an exacerbation. Sometimes it can happen after a quick change in temperature or being around chemicals. Work with your doctor to make a plan for dealing with an exacerbation. You can better manage it if you plan ahead. Follow-up care is a key part of your treatment and safety. Be sure to make and go to all appointments, and call your doctor if you are having problems. It's also a good idea to know your test results and keep a list of the medicines you take. How can you care for yourself at home? During an exacerbation  · Do not panic if you start to have one. Quick treatment at home may help you prevent serious breathing problems. If you have a COPD exacerbation plan that you developed with your doctor, follow it. · Take your medicines exactly as your doctor tells you. ¨ Use your inhaler as directed by your doctor. If your symptoms do not get better after you use your medicine, have someone take you to the emergency room. Call an ambulance if necessary. ¨ With inhaled medicines, a spacer or a nebulizer may help you get more medicine to your lungs. Ask your doctor or pharmacist how to use them properly. Practice using the spacer in front of a mirror before you have an exacerbation. This may help you get the medicine into your lungs quickly. ¨ If your doctor has given you steroid pills, take them as directed. ¨ Your doctor may have given you a prescription for antibiotics, which you can fill if you need it. ¨ Talk to your doctor if you have any problems with your medicine.  And call your doctor if you have to use your antibiotic or steroid pills.  Preventing an exacerbation  · Do not smoke. This is the most important step you can take to prevent more damage to your lungs and prevent problems. If you already smoke, it is never too late to stop. If you need help quitting, talk to your doctor about stop-smoking programs and medicines. These can increase your chances of quitting for good. · Take your daily medicines as prescribed. · Avoid colds and flu. ¨ Get a pneumococcal vaccine. ¨ Get a flu vaccine each year, as soon as it is available. Ask those you live or work with to do the same, so they will not get the flu and infect you. ¨ Try to stay away from people with colds or the flu. ¨ Wash your hands often. · Avoid secondhand smoke; air pollution; cold, dry air; hot, humid air; and high altitudes. Stay at home with your windows closed when air pollution is bad. · Learn breathing techniques for COPD, such as breathing through pursed lips. These techniques can help you breathe easier during an exacerbation. When should you call for help? Call 911 anytime you think you may need emergency care. For example, call if:    · You have severe trouble breathing.     · You have severe chest pain.    Call your doctor now or seek immediate medical care if:    · You have new or worse shortness of breath.     · You develop new chest pain.     · You are coughing more deeply or more often, especially if you notice more mucus or a change in the color of your mucus.     · You cough up blood.     · You have new or increased swelling in your legs or belly.     · You have a fever.    Watch closely for changes in your health, and be sure to contact your doctor if:    · You need to use your antibiotic or steroid pills.     · Your symptoms are getting worse. Where can you learn more? Go to http://jaz-bhavna.info/. Enter Q834 in the search box to learn more about \"COPD Exacerbation Plan: Care Instructions. \"  Current as of: December 6, 2017  Content Version: 11.7  © 7375-2895 [employer]. Care instructions adapted under license by [employer] (which disclaims liability or warranty for this information). If you have questions about a medical condition or this instruction, always ask your healthcare professional. [address] any warranty or liability for your use of this information. Learning About COPD and Clearing Your Lungs  How does clearing your lungs affect COPD? When you have COPD, you may have too much mucus in your lungs. Learning to clear your lungs may help you save energy and improves your breathing. It may also help prevent lung infections. There are three ways to clear your lungs:  · Controlled coughing  · Postural drainage  · Chest percussion  How do you do controlled coughing? Coughing is how your body tries to get rid of mucus. But the kind of coughing you cannot control makes things worse. It causes your airways to close. It also traps the mucus in your lungs. Controlled coughing comes from deep in your lungs. It loosens mucus and moves it though your airways. It is best to do it after you use your inhaler or other medicine. 1. Sit on the edge of a chair. Keep both feet on the floor. 2. Lean forward a little. Relax. 3. Breathe in slowly through your nose. Fold your arms over your belly. 4. Lean forward as you breathe out. Push your arms against your belly. 5. Cough 2 or 3 times as you exhale with your mouth slightly open. Make the coughs short and sharp. Push on your belly with your arms as you cough. The first cough brings the mucus through the lung airways. The next coughs bring it up and out. 6. Inhale again, but do it slowly and gently through your nose. Do not take quick or deep breaths through your mouth. It can block the mucus coming out of the lungs. It also can cause uncontrolled coughing. 7. Rest, and repeat if you need to.   How do you do postural drainage? Postural drainage means lying down in different positions to help drain mucus from your lungs. Hold each position for 5 minutes. Do it about 30 minutes after you use your inhaler. Make sure you have an empty stomach. If you need to cough, sit up and do controlled coughing. 1. To drain the front of your lungs: Make sure your chest is lower than your hips. Put two pillows under your hips. Use a small pillow under your head. Keep your arms at your sides. Then follow these instructions for breathing:  ¨ Breathe in: With one hand on your belly and the other on your chest, breathe in. Push your belly out as far as possible. You should be able to feel the hand on your belly move out, while the hand on your chest should not move. ¨ Breathe out: When you breathe out, you should be able to feel the hand on your belly move in. This is called diaphragmatic breathing. You will use it in the other drainage positions too. 2. To drain the sides of your lungs: Place two or three pillows under your hips. Use a small pillow under your head. Make sure your chest is lower than your hips. Use diaphragmatic breathing. After 5 or 10 minutes, switch sides. 3. To drain the back of your lungs: Place two or three pillows under your hips. Use a small pillow under your head. Kneel over the pillows. Place your arms by your head. Use diaphragmatic breathing. How do you do chest percussion? Chest percussion means that you lightly tap your chest and back. The tapping loosens the mucus in your lungs. · Cup your hand, and lightly tap your chest and back. · Ask your doctor where the best spots are to tap. Avoid your spine and breastbone. · It may be easier to have someone do the tapping for you. Follow-up care is a key part of your treatment and safety. Be sure to make and go to all appointments, and call your doctor if you are having problems.  It's also a good idea to know your test results and keep a list of the medicines you take.  Where can you learn more? Go to http://jaz-bhavna.info/. Enter N719 in the search box to learn more about \"Learning About COPD and Clearing Your Lungs. \"  Current as of: December 6, 2017  Content Version: 11.7  © 4634-5687 [employer]. Care instructions adapted under license by "[employer] (which disclaims liability or warranty for this information). If you have questions about a medical condition or this instruction, always ask your healthcare professional. [address] any warranty or liability for your use of this information. Chronic Obstructive Pulmonary Disease (COPD) Flare-Ups: Care Instructions  Your Care Instructions    Chronic obstructive pulmonary disease (COPD) is a lung disease that makes it hard to breathe. It is caused by damage to the lungs over many years, usually from smoking. COPD is often a mix of two diseases:  · Chronic bronchitis: The airways that carry air to the lungs (bronchial tubes) get inflamed and make a lot of mucus. This can narrow or block the airways. · Emphysema: In a healthy person, the tiny air sacs in the lungs are like balloons. As you breathe in and out, they get bigger and smaller to move air through your lungs. But with emphysema, these air sacs are damaged and lose their stretch. Less air gets in and out of the lungs. Many people with COPD have attacks called flare-ups or exacerbations. This is when your usual symptoms quickly get worse and stay worse. The doctor has checked you carefully. But problems can develop later. If you notice any problems or new symptoms, get medical treatment right away. Follow-up care is a key part of your treatment and safety. Be sure to make and go to all appointments, and call your doctor if you are having problems. It's also a good idea to know your test results and keep a list of the medicines you take. How can you care for yourself at home?   · Be safe with medicines. Take your medicines exactly as prescribed. Call your doctor if you think you are having a problem with your medicine. You may be taking medicines such as:  ¨ Bronchodilators. These help open your airways and make breathing easier. ¨ Corticosteroids. These reduce airway inflammation. They may be given as pills, in a vein, or in an inhaled form. You may go home with pills in addition to an inhaler that you already use. · A spacer may help you get more inhaled medicine to your lungs. Ask your doctor or pharmacist if a spacer is right for you. If it is, ask how to use it properly. · If your doctor prescribed antibiotics, take them as directed. Do not stop taking them just because you feel better. You need to take the full course of antibiotics. · If your doctor prescribed oxygen, use the flow rate your doctor has recommended. Do not change it without talking to your doctor first.  · Do not smoke. Smoking makes COPD worse. If you need help quitting, talk to your doctor about stop-smoking programs and medicines. These can increase your chances of quitting for good. When should you call for help? Call 911 anytime you think you may need emergency care. For example, call if:    · You have severe trouble breathing.    Call your doctor now or seek immediate medical care if:    · You have new or worse trouble breathing.     · Your coughing or wheezing gets worse.     · You cough up dark brown or bloody mucus (sputum).     · You have a new or higher fever.    Watch closely for changes in your health, and be sure to contact your doctor if:    · You notice more mucus or a change in the color of your mucus.     · You need to use your antibiotic or steroid pills.     · You do not get better as expected. Where can you learn more? Go to http://jaz-bhavna.info/.   Enter M904 in the search box to learn more about \"Chronic Obstructive Pulmonary Disease (COPD) Flare-Ups: Care Instructions. \"  Current as of: December 6, 2017  Content Version: 11.7  © 8660-7218 [employer], [facility]. Care instructions adapted under license by [employer] (which disclaims liability or warranty for this information). If you have questions about a medical condition or this instruction, always ask your healthcare professional. [place] [address] any warranty or liability for your use of this information.

## 2018-09-23 LAB
ATRIAL RATE: 86 BPM
CALCULATED P AXIS, ECG09: 59 DEGREES
CALCULATED R AXIS, ECG10: 54 DEGREES
CALCULATED T AXIS, ECG11: 68 DEGREES
DIAGNOSIS, 93000: NORMAL
P-R INTERVAL, ECG05: 136 MS
Q-T INTERVAL, ECG07: 354 MS
QRS DURATION, ECG06: 84 MS
QTC CALCULATION (BEZET), ECG08: 423 MS
VENTRICULAR RATE, ECG03: 86 BPM

## 2018-09-28 ENCOUNTER — TELEPHONE (OUTPATIENT)
Dept: FAMILY MEDICINE CLINIC | Age: 63
End: 2018-09-28

## 2018-09-28 NOTE — TELEPHONE ENCOUNTER
Verified patient by 2 identifiers. Asked patient how was he doing? Per our last conversation patient was heading to the ED for SOB. Patient states he is doing so much better. Informed patient that was great to here. Informed patient I wasn't too sure if he had understood his results during the time of the call due to his emergent situation. Per Dr Cox Older lab results states Glucose have gotten worse; A1c have gotten worse as well; Patient has had multiple courses of steroids for COPD; told patient to make sure he continue to keep his BG logs and to bring those readings to his appointment to his next appointment on October 2, 2018 at 9:15 AM. Patient verbalized understanding.

## 2018-10-02 ENCOUNTER — OFFICE VISIT (OUTPATIENT)
Dept: FAMILY MEDICINE CLINIC | Age: 63
End: 2018-10-02

## 2018-10-02 VITALS
HEIGHT: 76 IN | HEART RATE: 87 BPM | RESPIRATION RATE: 18 BRPM | SYSTOLIC BLOOD PRESSURE: 118 MMHG | BODY MASS INDEX: 28.67 KG/M2 | WEIGHT: 235.4 LBS | DIASTOLIC BLOOD PRESSURE: 71 MMHG | OXYGEN SATURATION: 97 % | TEMPERATURE: 98 F

## 2018-10-02 DIAGNOSIS — E11.40 TYPE 2 DIABETES MELLITUS WITH DIABETIC NEUROPATHY, UNSPECIFIED WHETHER LONG TERM INSULIN USE (HCC): Primary | ICD-10-CM

## 2018-10-02 DIAGNOSIS — Z72.0 TOBACCO ABUSE: ICD-10-CM

## 2018-10-02 DIAGNOSIS — E11.21 TYPE 2 DIABETES MELLITUS WITH DIABETIC NEPHROPATHY, WITH LONG-TERM CURRENT USE OF INSULIN (HCC): ICD-10-CM

## 2018-10-02 DIAGNOSIS — J41.1 MUCOPURULENT CHRONIC BRONCHITIS (HCC): ICD-10-CM

## 2018-10-02 DIAGNOSIS — Z79.4 TYPE 2 DIABETES MELLITUS WITH DIABETIC NEPHROPATHY, WITH LONG-TERM CURRENT USE OF INSULIN (HCC): ICD-10-CM

## 2018-10-02 DIAGNOSIS — Z28.21 REFUSED INFLUENZA VACCINE: ICD-10-CM

## 2018-10-02 PROBLEM — E11.9 TYPE 2 DIABETES MELLITUS, WITH LONG-TERM CURRENT USE OF INSULIN (HCC): Status: ACTIVE | Noted: 2018-04-18

## 2018-10-02 LAB
ALBUMIN UR QL STRIP: 150 MG/L
CREATININE, URINE POC: 50 MG/DL
GLUCOSE POC: 323 MG/DL
MICROALBUMIN/CREAT RATIO POC: >300 MG/G

## 2018-10-02 RX ORDER — BUPROPION HYDROCHLORIDE 150 MG/1
150 TABLET, EXTENDED RELEASE ORAL 2 TIMES DAILY
Qty: 60 TAB | Refills: 1 | Status: SHIPPED | OUTPATIENT
Start: 2018-10-02 | End: 2020-01-10

## 2018-10-02 NOTE — MR AVS SNAPSHOT
[address] 
[phone] Patient: Yun Godinez MRN: [id number] [facility]:[id number] Visit Information Date & Time Provider Department Dept. Phone Encounter #  
 10/2/2018  9:15 AM Elicia Lei, [address] [phone] [id number] Follow-up Instructions Return in about 3 months (around 1/2/2019) for Diabetes follow up. Your Appointments 5/9/2019 11:40 AM  
ESTABLISHED PATIENT with Keyona Stewart MD  
[facility]) Appt Note: annual visit [address] [facility] [address]  
54 [facility] [address] [address] Upcoming Health Maintenance Date Due MICROALBUMIN Q1 10/5/2018 EYE EXAM RETINAL OR DILATED Q1 1/2/2019* Shingrix Vaccine Age 50> (1 of 2) 10/2/2019* Influenza Age 5 to Adult 10/2/2019* HEMOGLOBIN A1C Q6M 3/18/2019 FOOT EXAM Q1 9/18/2019 LIPID PANEL Q1 9/18/2019 DTaP/Tdap/Td series (2 - Td) 9/15/2022 COLONOSCOPY 4/25/2026 *Topic was postponed. The date shown is not the original due date. Allergies as of 10/2/2018  Review Complete On: 10/2/2018 By: Yudith Tovar No Known Allergies Current Immunizations  Reviewed on 6/3/2018 No immunizations on file. Not reviewed this visit You Were Diagnosed With   
  
 Codes Comments Type 2 diabetes mellitus with diabetic neuropathy, unspecified whether long term insulin use ([address]    -  Primary ICD-10-CM: E11.40 ICD-9-CM: 250.60, 357.2 Tobacco abuse     ICD-10-CM: Z72.0 ICD-9-CM: 305.1 Mucopurulent chronic bronchitis ([address]     ICD-10-CM: J41.1 ICD-9-CM: 491.1 Vitals BP Pulse Temp Resp Height(growth percentile) Weight(growth percentile)  118/71 (BP 1 Location: Right arm, BP Patient Position: Sitting) 87 98 °F (36.7 °C) (Oral) 18 6' 4\" (1.93 m) 235 lb 6.4 oz (106.8 kg) SpO2 BMI Smoking Status 97% 28.65 kg/m2 Current Some Day Smoker Vitals History BMI and BSA Data Body Mass Index Body Surface Area  
 28.65 kg/m 2 2.39 m 2 Preferred Pharmacy Pharmacy Name Phone [facility] [address] [phone] Your Updated Medication List  
  
   
This list is accurate as of 10/2/18  9:59 AM.  Always use your most recent med list.  
  
  
  
  
 albuterol 90 mcg/actuation inhaler Commonly known as:  PROVENTIL HFA, VENTOLIN HFA, PROAIR HFA Take 2 Puffs by inhalation every four (4) hours as needed for Wheezing. albuterol-ipratropium 2.5 mg-0.5 mg/3 ml Nebu Commonly known as:  DUO-NEB  
3 mL by Nebulization route every four (4) hours as needed. BASAGLAR KWIKPEN U-100 INSULIN 100 unit/mL (3 mL) Inpn Generic drug:  insulin glargine INJECT 33 UNITS SUBCUTANEOUSLY ONCE DAILY AT  NIGHT  
  
 buPROPion  mg SR tablet Commonly known as:  Raquel Martinez Take 1 Tab by mouth two (2) times a day. Start taking once daily for 3 days then increase to 2 times daily. fluticasone-salmeterol 115-21 mcg/actuation inhaler Commonly known as:  ADVAIR HFA Take 2 Puffs by inhalation two (2) times a day.  
  
 gabapentin 600 mg tablet Commonly known as:  NEURONTIN Take  by mouth two (2) times a day. glipiZIDE 10 mg tablet Commonly known as:  [place] Dates Take 1 Tab by mouth daily. Insulin Needles (Disposable) 31 gauge x 1/4\" Ndle Commonly known as:  ULTICARE PEN NEEDLE  
USE ONE PEN NEEDLE DAILY Lancets Misc Check blood glucose daily  
  
 lisinopril-hydroCHLOROthiazide 20-12.5 mg per tablet Commonly known as:  PRINZIDE, ZESTORETIC  
TAKE ONE TABLET BY MOUTH ONCE DAILY  
  
 metFORMIN 1,000 mg tablet Commonly known as:  GLUCOPHAGE  
TAKE ONE TABLET BY MOUTH TWICE DAILY WITH MEALS  
  
 montelukast 10 mg tablet Commonly known as:  SINGULAIR Take 1 Tab by mouth daily. Nebulizer & Compressor machine 1 Each by Does Not Apply route daily as needed. oxyCODONE-acetaminophen 5-325 mg per tablet Commonly known as:  PERCOCET * predniSONE 20 mg tablet Commonly known as:  [place] Take 40mg for 3 days, then 20mg for 3 days, then 10mg for 3 days. * predniSONE 10 mg dose pack Commonly known as:  STERAPRED DS Take by mouth as directed  
  
 tiotropium 18 mcg inhalation capsule Commonly known as:  [address] Take 1 Cap by inhalation daily. Indications: BRONCHOSPASM PREVENTION WITH COPD  
  
 TRUE METRIX GLUCOSE METER Misc Generic drug:  Blood-Glucose Meter USE TO CHECK GLUCOSE ONCE DAILY * TRUETEST TEST STRIPS strip Generic drug:  glucose blood VI test strips CHECK BLOOD GLUCOSE FASTING ONCE DAILY * TRUETEST TEST STRIPS strip Generic drug:  glucose blood VI test strips USE AS DIRECTED TO TEST BLOOD SUGAR ONCE DAILY * TRUE METRIX GLUCOSE TEST STRIP strip Generic drug:  glucose blood VI test strips USE 1 STRIP TO CHECK GLUCOSE ONCE DAILY  
  
 varenicline 0.5 mg (11)- 1 mg (42) Dspk Commonly known as:  CHANTIX STARTER PAUL Take 0.5 mg by mouth two (2) times daily (after meals). * Notice: This list has 5 medication(s) that are the same as other medications prescribed for you. Read the directions carefully, and ask your doctor or other care provider to review them with you. Prescriptions Sent to Pharmacy Refills buPROPion SR (WELLBUTRIN SR) 150 mg SR tablet 1 Sig: Take 1 Tab by mouth two (2) times a day. Start taking once daily for 3 days then increase to 2 times daily. Class: Normal  
 Pharmacy: [facility] [address] Ph #: [phone] Route: Oral  
  
We Performed the Following AMB POC GLUCOSE BLOOD, BY GLUCOSE MONITORING DEVICE [66737 CPT(R)] COLLECTION CAPILLARY BLOOD SPECIMEN [23103 CPT(R)] Follow-up Instructions Return in about 3 months (around 1/2/2019) for Diabetes follow up. Patient Instructions Medical Supply Stores: 
 
[facility] [address] [place], [place] ?  
[phone] ? · Buy With Fetch. [employer] [address] [facility], [address] [facility] [address] 
[phone] [facility] [address], [facility], [address]  
[phone] Introducing [facility]! [facility] introduces Kiyon patient portal. Now you can access parts of your medical record, email your doctor's office, and request medication refills online. 1. In your internet browser, go to https://[employer] ([employer]). [employer] 2. Click on the First Time User? Click Here link in the Sign In box. You will see the New Member Sign Up page. 3. Enter your Kiyon Access Code exactly as it appears below. You will not need to use this code after youve completed the sign-up process. If you do not sign up before the expiration date, you must request a new code. · Kiyon Access Code: [email or web address] Expires: 10/29/2018 11:02 AM 
 
4. Enter the last four digits of your Social Security Number (xxxx) and Date of Birth (mm/dd/yyyy) as indicated and click Submit. You will be taken to the next sign-up page. 5. Create a Kiyon ID. This will be your Kiyon login ID and cannot be changed, so think of one that is secure and easy to remember. 6. Create a Kiyon password. You can change your password at any time. 7. Enter your Password Reset Question and Answer. This can be used at a later time if you forget your password. 8. Enter your e-mail address. You will receive e-mail notification when new information is available in [address] 9. Click Sign Up. You can now view and download portions of your medical record. 10. Click the Download Summary menu link to download a portable copy of your medical information. If you have questions, please visit the Frequently Asked Questions section of the [employer] website. Remember, [employer] is NOT to be used for urgent needs. For medical emergencies, dial 911. Now available from your iPhone and Android! Please provide this summary of care documentation to your next provider. Your primary care clinician is listed as Shireen Hanson. If you have any questions after today's visit, please call [phone].
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
## 2018-10-02 NOTE — PROGRESS NOTES
Chief Complaint Patient presents with  Diabetes  ED Follow-up 1. Have you been to the ER, urgent care clinic since your last visit? Hospitalized since your last visit? Yes When: 9/21/18 Where: [facility] Reason for visit: SOB 2. Have you seen or consulted any other health care providers outside of the [address] [place] [address] since your last visit? Include any pap smears or colon screening. No 
Patient states its been a few days without smoking but he would like a cigarette.

## 2018-10-02 NOTE — PATIENT INSTRUCTIONS
Medical Supply Stores: 
 
[facility] [address] [place], [place] ?  
[phone] ? · [email or web address] [address] [facility], [id number] [place] [address] 
[phone] [facility] Neeraj Saeed [address] [place] [address]  
[phone]

## 2018-11-03 DIAGNOSIS — J41.1 MUCOPURULENT CHRONIC BRONCHITIS (HCC): ICD-10-CM

## 2018-11-05 RX ORDER — FLUTICASONE PROPIONATE AND SALMETEROL XINAFOATE 115; 21 UG/1; UG/1
AEROSOL, METERED RESPIRATORY (INHALATION)
Qty: 12 G | Refills: 1 | Status: SHIPPED | OUTPATIENT
Start: 2018-11-05 | End: 2019-01-15 | Stop reason: SDUPTHER

## 2018-11-26 RX ORDER — METFORMIN HYDROCHLORIDE 1000 MG/1
TABLET ORAL
Qty: 60 TAB | Refills: 11 | Status: SHIPPED | OUTPATIENT
Start: 2018-11-26 | End: 2019-11-11 | Stop reason: SDUPTHER

## 2018-11-26 RX ORDER — GLIPIZIDE 10 MG/1
TABLET ORAL
Qty: 60 TAB | Refills: 11 | Status: SHIPPED | OUTPATIENT
Start: 2018-11-26 | End: 2020-05-05

## 2018-12-24 ENCOUNTER — OFFICE VISIT (OUTPATIENT)
Dept: FAMILY MEDICINE CLINIC | Age: 63
End: 2018-12-24

## 2018-12-24 VITALS
BODY MASS INDEX: 29.1 KG/M2 | DIASTOLIC BLOOD PRESSURE: 71 MMHG | HEART RATE: 70 BPM | HEIGHT: 76 IN | RESPIRATION RATE: 16 BRPM | TEMPERATURE: 98.3 F | WEIGHT: 239 LBS | SYSTOLIC BLOOD PRESSURE: 124 MMHG | OXYGEN SATURATION: 96 %

## 2018-12-24 DIAGNOSIS — Z79.4 TYPE 2 DIABETES MELLITUS WITH DIABETIC NEPHROPATHY, WITH LONG-TERM CURRENT USE OF INSULIN (HCC): Primary | ICD-10-CM

## 2018-12-24 DIAGNOSIS — E11.21 TYPE 2 DIABETES MELLITUS WITH DIABETIC NEPHROPATHY, WITH LONG-TERM CURRENT USE OF INSULIN (HCC): Primary | ICD-10-CM

## 2018-12-24 NOTE — PROGRESS NOTES
59year old male here for diabetes followup    Having to switch insulin because of insurance changes    DM has not been controlled    Patient is resistant to taking insulin twice daily    Will explore options with insurance provider    I saw and evaluated the patient, performing the key elements of the service. I discussed the findings, assessment and plan with the resident and agree with the resident's findings and plan as documented in the resident's note.

## 2018-12-24 NOTE — PATIENT INSTRUCTIONS
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
## 2019-01-02 DIAGNOSIS — J44.1 COPD EXACERBATION (HCC): ICD-10-CM

## 2019-01-05 RX ORDER — ALBUTEROL SULFATE 90 UG/1
AEROSOL, METERED RESPIRATORY (INHALATION)
Qty: 3 INHALER | Refills: 2 | Status: SHIPPED | OUTPATIENT
Start: 2019-01-05 | End: 2019-01-15 | Stop reason: SDUPTHER

## 2019-01-15 ENCOUNTER — OFFICE VISIT (OUTPATIENT)
Dept: FAMILY MEDICINE CLINIC | Age: 64
End: 2019-01-15

## 2019-01-15 VITALS
TEMPERATURE: 97.6 F | BODY MASS INDEX: 29.83 KG/M2 | DIASTOLIC BLOOD PRESSURE: 77 MMHG | WEIGHT: 245 LBS | HEIGHT: 76 IN | SYSTOLIC BLOOD PRESSURE: 135 MMHG | HEART RATE: 81 BPM | RESPIRATION RATE: 20 BRPM | OXYGEN SATURATION: 95 %

## 2019-01-15 DIAGNOSIS — R76.8 HEPATITIS C ANTIBODY TEST POSITIVE: ICD-10-CM

## 2019-01-15 DIAGNOSIS — J41.1 MUCOPURULENT CHRONIC BRONCHITIS (HCC): ICD-10-CM

## 2019-01-15 DIAGNOSIS — E11.21 TYPE 2 DIABETES MELLITUS WITH DIABETIC NEPHROPATHY, WITH LONG-TERM CURRENT USE OF INSULIN (HCC): Primary | ICD-10-CM

## 2019-01-15 DIAGNOSIS — R80.1 PERSISTENT PROTEINURIA: ICD-10-CM

## 2019-01-15 DIAGNOSIS — Z79.4 TYPE 2 DIABETES MELLITUS WITH DIABETIC NEPHROPATHY, WITH LONG-TERM CURRENT USE OF INSULIN (HCC): Primary | ICD-10-CM

## 2019-01-15 DIAGNOSIS — I10 ESSENTIAL HYPERTENSION: ICD-10-CM

## 2019-01-15 DIAGNOSIS — J44.1 COPD EXACERBATION (HCC): ICD-10-CM

## 2019-01-15 RX ORDER — ALBUTEROL SULFATE 90 UG/1
AEROSOL, METERED RESPIRATORY (INHALATION)
Qty: 3 INHALER | Refills: 2 | Status: SHIPPED | OUTPATIENT
Start: 2019-01-15 | End: 2019-10-02 | Stop reason: SDUPTHER

## 2019-01-15 NOTE — PROGRESS NOTES
Chief Complaint Patient presents with  Follow Up Chronic Condition  
  diabetes 1. Have you been to the ER, urgent care clinic since your last visit? Hospitalized since your last visit? No 
 
2. Have you seen or consulted any other health care providers outside of the [address] [place] [address] since your last visit? Include any pap smears or colon screening.  No
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
## 2019-01-16 ENCOUNTER — TELEPHONE (OUTPATIENT)
Dept: FAMILY MEDICINE CLINIC | Age: 64
End: 2019-01-16

## 2019-01-16 NOTE — TELEPHONE ENCOUNTER
----- Message from Gregg Call sent at 1/16/2019  1:52 PM EST -----  Regarding: Dr. Paula Romero, with [address] is requesting a prior-authorization to be called in to [phone] for pt's medication Spiriva.  Phone for Lynette Gentle: [phone] ext- [phone]

## 2019-01-16 NOTE — TELEPHONE ENCOUNTER
PA for Spiriva sent on 1/16/19 through CoverMyMeds.   Brandy Sampson Key: [id number] - PA Case ID: [id number] - Rx #: [id number]

## 2019-01-17 NOTE — TELEPHONE ENCOUNTER
PA approved notified patient of approval. Patient verbalized understanding. Notified [facility] of PA approval for Spiriva 1/17/19 through 1/17/20. Pharmacy verbalized understanding.

## 2019-01-19 LAB
ALBUMIN SERPL-MCNC: 4 G/DL (ref 3.6–4.8)
ALBUMIN/GLOB SERPL: 1.5 {RATIO} (ref 1.2–2.2)
ALP SERPL-CCNC: 57 IU/L (ref 39–117)
ALT SERPL-CCNC: 47 IU/L (ref 0–44)
AST SERPL-CCNC: 33 IU/L (ref 0–40)
BILIRUB SERPL-MCNC: 0.2 MG/DL (ref 0–1.2)
BUN SERPL-MCNC: 13 MG/DL (ref 8–27)
BUN/CREAT SERPL: 15 (ref 10–24)
CALCIUM SERPL-MCNC: 9.4 MG/DL (ref 8.6–10.2)
CHLORIDE SERPL-SCNC: 96 MMOL/L (ref 96–106)
CO2 SERPL-SCNC: 26 MMOL/L (ref 20–29)
CREAT SERPL-MCNC: 0.85 MG/DL (ref 0.76–1.27)
EST. AVERAGE GLUCOSE BLD GHB EST-MCNC: 220 MG/DL
GLOBULIN SER CALC-MCNC: 2.6 G/DL (ref 1.5–4.5)
GLUCOSE SERPL-MCNC: 301 MG/DL (ref 65–99)
HBA1C MFR BLD: 9.3 % (ref 4.8–5.6)
HCV GENOTYPE: NORMAL
HCV GENTYP SERPL NAA+PROBE: NORMAL
HCV RNA SERPL NAA+PROBE-ACNC: NORMAL IU/ML
HCV RNA SERPL NAA+PROBE-LOG IU: 6.42 LOG10 IU/ML
PLEASE NOTE, 550474: NORMAL
POTASSIUM SERPL-SCNC: 4.7 MMOL/L (ref 3.5–5.2)
PROT SERPL-MCNC: 6.6 G/DL (ref 6–8.5)
SODIUM SERPL-SCNC: 137 MMOL/L (ref 134–144)
TEST INFORMATION: NORMAL

## 2019-01-24 ENCOUNTER — TELEPHONE (OUTPATIENT)
Dept: FAMILY MEDICINE CLINIC | Age: 64
End: 2019-01-24

## 2019-01-24 NOTE — PROGRESS NOTES
A1c remains above goal 
Patient was out of med for period of time Will continue current regimen Active Hep C; referred to GI in past without follow up Will renew referral

## 2019-01-24 NOTE — TELEPHONE ENCOUNTER
Attempted to call patient in regards to lab results below per Dr Kris Olivera. Unable to reach patient, LVM to call the office.         ----- Message from London Miranda MD sent at 1/24/2019 10:39 AM EST -----  A1c remains above goal  Patient was out of med for period of time  Will continue current regimen  Active Hep C; referred to GI in past without follow up   Will renew referral

## 2019-01-24 NOTE — TELEPHONE ENCOUNTER
----- Message from Ngozi Molina sent at 1/24/2019  3:51 PM EST -----  Regarding: Dr. Sonja Thakkar telephone   Pt is requesting a call back in regards to missed call from practice.  Best contact [phone]

## 2019-01-27 NOTE — TELEPHONE ENCOUNTER
Verified patient by 2 identifiers. Informed patient per Dr Cosmo Page of lab results below:    A1c remains above goal  Patient was out of med for period of time  Will continue current regimen  Active Hep C; referred to GI in past without follow up   Will renew referral    Patient states \"he figured his A1c will be off due to not having medication. Patient verbalized understanding of needing to see GI for active Hep C.

## 2019-01-29 ENCOUNTER — TELEPHONE (OUTPATIENT)
Dept: FAMILY MEDICINE CLINIC | Age: 64
End: 2019-01-29

## 2019-01-29 NOTE — TELEPHONE ENCOUNTER
Patient called back, I informed him the referral order was placed and gave him the contact information to Dr. Nalini Song. Patient is aware to call back with the appointment information. Patient states if you need to speak with him you can call him back.

## 2019-01-29 NOTE — TELEPHONE ENCOUNTER
Attempted to contact patient in regards to referral for GI per Dr Katelin Burton. Patient unavailable, LVM to call the office. ----- Message from Nia Duarte MD sent at 1/28/2019  2:12 PM EST -----  Just placed it. Dr. Tabitha Lima.     ----- Message -----  From: Sudhir Winston  Sent: 1/27/2019  12:40 PM  To: Nia Duarte MD    Done. Have you placed referral for GI? Bere Valentino    ----- Message -----  From: Chloe Whitt MD  Sent: 1/24/2019  10:39 AM  To:  [place]    A1c remains above goal  Patient was out of med for period of time  Will continue current regimen  Active Hep C; referred to GI in past without follow up   Will renew referral

## 2019-02-16 RX ORDER — CALCIUM CITRATE/VITAMIN D3 200MG-6.25
TABLET ORAL
Qty: 50 STRIP | Refills: 3 | Status: SHIPPED | OUTPATIENT
Start: 2019-02-16 | End: 2019-06-28 | Stop reason: SDUPTHER

## 2019-04-09 ENCOUNTER — TELEPHONE (OUTPATIENT)
Dept: FAMILY MEDICINE CLINIC | Age: 64
End: 2019-04-09

## 2019-04-09 RX ORDER — INSULIN GLARGINE 100 [IU]/ML
INJECTION, SOLUTION SUBCUTANEOUS
Qty: 9 ADJUSTABLE DOSE PRE-FILLED PEN SYRINGE | Refills: 19 | Status: SHIPPED | OUTPATIENT
Start: 2019-04-09 | End: 2019-05-07 | Stop reason: SDUPTHER

## 2019-04-15 NOTE — TELEPHONE ENCOUNTER
Medication is at pharmacy and needs Prior Auth per patient.          BASAGLAR KWIKPEN U-100 INSULIN 100 unit/mL (3 mL) inpn [[id number]]     Order Details            Priority and Order Details     Class     Normal     Warnings History     No Interaction Warnings [facility] [address] [place]   Order History   Outpatient   Date/Time Action Taken User Additional Information   04/09/19 [address] Bob Kuo MD Reorder from Order: [id number]   04/09/19 [facility] Bob Kuo MD    Destination     This Medication Went To:   [facility] [[id number]]   Outpatient Medication Detail      Disp Refills Start End    BASAGLAR KWIKPEN U-100 INSULIN 100 unit/mL (3 mL) inpn 9 Adjustable Dose Pre-filled Pen Syringe 19 4/9/2019     Sig: INJECT 33 UNITS SUBCUTANEOUSLY ONCE DAILY AT NIGHT    Sent to pharmacy as: Gillermina Barthel U-100 INSULIN 100 unit/mL (3 mL) in    Notes to Pharmacy: Please consider 90 day supplies to promote better adherence    E-Prescribing Status: Receipt confirmed by pharmacy (4/9/2019  4:55 PM EDT)

## 2019-04-15 NOTE — TELEPHONE ENCOUNTER
Patient called stating they pharmacy is telling him they did not receive the refill for his insulin. Can you please resend?

## 2019-04-16 NOTE — TELEPHONE ENCOUNTER
[address] Coordinator is calling as insulin requires Prior Auth. She states they have been trying to get this resolved as patient need insulin. Previous Auth  in March.     Please call [phone]  Ext [phone]    Nurse can call for auth also at [phone]    thanks

## 2019-05-06 DIAGNOSIS — J41.1 MUCOPURULENT CHRONIC BRONCHITIS (HCC): ICD-10-CM

## 2019-05-06 DIAGNOSIS — I10 HTN, GOAL BELOW 140/90: ICD-10-CM

## 2019-05-07 RX ORDER — INSULIN GLARGINE 100 [IU]/ML
INJECTION, SOLUTION SUBCUTANEOUS
Qty: 9 PEN | Refills: 4 | Status: SHIPPED | OUTPATIENT
Start: 2019-05-07 | End: 2020-05-05

## 2019-05-07 RX ORDER — FLUTICASONE PROPIONATE AND SALMETEROL XINAFOATE 115; 21 UG/1; UG/1
AEROSOL, METERED RESPIRATORY (INHALATION)
Qty: 12 G | Refills: 3 | Status: SHIPPED | OUTPATIENT
Start: 2019-05-07 | End: 2019-08-23 | Stop reason: SDUPTHER

## 2019-05-07 RX ORDER — LISINOPRIL AND HYDROCHLOROTHIAZIDE 12.5; 2 MG/1; MG/1
TABLET ORAL
Qty: 90 TAB | Refills: 3 | Status: SHIPPED | OUTPATIENT
Start: 2019-05-07 | End: 2019-05-29 | Stop reason: ALTCHOICE

## 2019-05-09 ENCOUNTER — OFFICE VISIT (OUTPATIENT)
Dept: CARDIOLOGY CLINIC | Age: 64
End: 2019-05-09

## 2019-05-09 VITALS
WEIGHT: 237 LBS | OXYGEN SATURATION: 94 % | SYSTOLIC BLOOD PRESSURE: 148 MMHG | HEART RATE: 73 BPM | HEIGHT: 76 IN | BODY MASS INDEX: 28.86 KG/M2 | DIASTOLIC BLOOD PRESSURE: 82 MMHG

## 2019-05-09 DIAGNOSIS — J42 CHRONIC BRONCHITIS, UNSPECIFIED CHRONIC BRONCHITIS TYPE (HCC): ICD-10-CM

## 2019-05-09 DIAGNOSIS — I10 ESSENTIAL HYPERTENSION: Primary | ICD-10-CM
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
## 2019-05-09 NOTE — PROGRESS NOTES
Chief Complaint   Patient presents with    Annual Exam    Hypertension     Visit Vitals  /82 (BP 1 Location: Right arm, BP Patient Position: Sitting)   Pulse 73   Ht 6' 4\" (1.93 m)   Wt 237 lb (107.5 kg)   SpO2 94%   BMI 28.85 kg/m²     Chest pain denied; COPD   SOB occassionally  Dizziness denied  Swelling denied  Recent hospital visit denied  Refills denied    Pt stated he hasn't taken his BP meds yet this morning. Stopped smoking for 3 years; started back up about 4 years ago; quit again, 6 months ago started back.

## 2019-05-14 NOTE — TELEPHONE ENCOUNTER
Patient called about his three medication orders recently requested from the pharmacy. Reviewed the list with him and he was informed the doctor took care of that request one week ago in which he should check with the pharmacy.     [phone]

## 2019-05-29 ENCOUNTER — OFFICE VISIT (OUTPATIENT)
Dept: FAMILY MEDICINE CLINIC | Age: 64
End: 2019-05-29

## 2019-05-29 VITALS
OXYGEN SATURATION: 97 % | HEART RATE: 93 BPM | WEIGHT: 236 LBS | BODY MASS INDEX: 28.74 KG/M2 | SYSTOLIC BLOOD PRESSURE: 123 MMHG | DIASTOLIC BLOOD PRESSURE: 80 MMHG | HEIGHT: 76 IN | RESPIRATION RATE: 20 BRPM | TEMPERATURE: 98.3 F

## 2019-05-29 DIAGNOSIS — Z79.4 TYPE 2 DIABETES MELLITUS WITH DIABETIC NEPHROPATHY, WITH LONG-TERM CURRENT USE OF INSULIN (HCC): ICD-10-CM

## 2019-05-29 DIAGNOSIS — E11.21 TYPE 2 DIABETES MELLITUS WITH DIABETIC NEPHROPATHY, WITH LONG-TERM CURRENT USE OF INSULIN (HCC): ICD-10-CM

## 2019-05-29 DIAGNOSIS — I95.1 ORTHOSTATIC HYPOTENSION: ICD-10-CM

## 2019-05-29 DIAGNOSIS — I10 ESSENTIAL HYPERTENSION: ICD-10-CM

## 2019-05-29 DIAGNOSIS — R55 SYNCOPE AND COLLAPSE: Primary | ICD-10-CM

## 2019-05-29 DIAGNOSIS — R76.8 HEPATITIS C ANTIBODY TEST POSITIVE: ICD-10-CM

## 2019-05-29 DIAGNOSIS — J42 CHRONIC BRONCHITIS, UNSPECIFIED CHRONIC BRONCHITIS TYPE (HCC): ICD-10-CM

## 2019-05-29 RX ORDER — LISINOPRIL 20 MG/1
20 TABLET ORAL DAILY
Qty: 90 TAB | Refills: 0 | Status: SHIPPED | OUTPATIENT
Start: 2019-05-29 | End: 2020-01-10 | Stop reason: ALTCHOICE
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
## 2019-05-29 NOTE — PATIENT INSTRUCTIONS
Orthostatic Hypotension: Care Instructions  Your Care Instructions    Orthostatic hypotension is a quick drop in blood pressure. It happens when you get up from sitting or lying down. You may feel faint, lightheaded, or dizzy. When a person sits up or stands up, the body changes the way it pumps blood. This can slow the flow of blood to the brain for a very short time. And that can make you feel lightheaded. Many medicines can cause this problem, especially in older people. Lack of fluids (dehydration) or illnesses such as diabetes or heart disease also can cause it. Follow-up care is a key part of your treatment and safety. Be sure to make and go to all appointments, and call your doctor if you are having problems. It's also a good idea to know your test results and keep a list of the medicines you take. How can you care for yourself at home? · Tell your doctor about any problems you have with your medicines. · If your doctor prescribes medicine to help prevent a low blood pressure problem, take it exactly as prescribed. Call your doctor if you think you are having a problem with your medicine. · Drink plenty of fluids, enough so that your urine is light yellow or clear like water. Choose water and other caffeine-free clear liquids. If you have kidney, heart, or liver disease and have to limit fluids, talk with your doctor before you increase the amount of fluids you drink. · Limit or avoid alcohol and caffeine. · Get up slowly from bed or after sitting for a long time. If you are in bed, roll to your side and swing your legs over the edge of the bed and onto the floor. Push your body up to a sitting position. Wait for a while before you slowly stand up. If you are dizzy or lightheaded, sit or lie down. When should you call for help? Call 911 anytime you think you may need emergency care.  For example, call if:    · You passed out (lost consciousness).    Watch closely for changes in your health, and be sure to contact your doctor if:    · You do not get better as expected. Where can you learn more? Go to http://jaz-bhavna.info/. Enter G420 in the search box to learn more about \"Orthostatic Hypotension: Care Instructions. \"  Current as of: July 22, 2018  Content Version: 11.9  © 2006-2018 [employer]. Care instructions adapted under license by [employer] (which disclaims liability or warranty for this information). If you have questions about a medical condition or this instruction, always ask your healthcare professional. [place] [address] any warranty or liability for your use of this information. Fainting: Care Instructions  Your Care Instructions    When you faint, or pass out, you lose consciousness for a short time. A brief drop in blood flow to the brain often causes it. When you fall or lie down, more blood flows to your brain and you regain consciousness. Emotional stress, pain, or overheating--especially if you have been standing--can make you faint. In these cases, fainting is usually not serious. But fainting can be a sign of a more serious problem. Your doctor may want you to have more tests to rule out other causes. The treatment you need depends on the reason why you fainted. The doctor has checked you carefully, but problems can develop later. If you notice any problems or new symptoms, get medical treatment right away. Follow-up care is a key part of your treatment and safety. Be sure to make and go to all appointments, and call your doctor if you are having problems. It's also a good idea to know your test results and keep a list of the medicines you take. How can you care for yourself at home? · Drink plenty of fluids to prevent dehydration. If you have kidney, heart, or liver disease and have to limit fluids, talk with your doctor before you increase your fluid intake. When should you call for help?   Call [phone] anytime you think you may need emergency care. For example, call if:    · You have symptoms of a heart problem. These may include:  ? Chest pain or pressure. ? Severe trouble breathing. ? A fast or irregular heartbeat. ? Lightheadedness or sudden weakness. ? Coughing up pink, foamy mucus. ? Passing out. After you call 911, the  may tell you to chew 1 adult-strength or 2 to 4 low-dose aspirin. Wait for an ambulance. Do not try to drive yourself.     · You have symptoms of a stroke. These may include:  ? Sudden numbness, tingling, weakness, or loss of movement in your face, arm, or leg, especially on only one side of your body. ? Sudden vision changes. ? Sudden trouble speaking. ? Sudden confusion or trouble understanding simple statements. ? Sudden problems with walking or balance. ? A sudden, severe headache that is different from past headaches.     · You passed out (lost consciousness) again.    Watch closely for changes in your health, and be sure to contact your doctor if:    · You do not get better as expected. Where can you learn more? Go to http://jaz-bhavna.info/. Enter F679 in the search box to learn more about \"Fainting: Care Instructions. \"  Current as of: September 23, 2018  Content Version: 11.9  © 9550-3294 [employer]. Care instructions adapted under license by [employer] (which disclaims liability or warranty for this information). If you have questions about a medical condition or this instruction, always ask your healthcare professional. [place] [address] any warranty or liability for your use of this information.

## 2019-05-29 NOTE — PROGRESS NOTES
Health Maintenance Due   Topic Date Due    Pneumococcal 0-64 years (1 of 1 - PPSV23) 07/12/1961    EYE EXAM RETINAL OR DILATED  07/12/1966

## 2019-05-30 LAB
ALBUMIN SERPL-MCNC: 4 G/DL (ref 3.6–4.8)
ALBUMIN/GLOB SERPL: 1.5 {RATIO} (ref 1.2–2.2)
ALP SERPL-CCNC: 53 IU/L (ref 39–117)
ALT SERPL-CCNC: 35 IU/L (ref 0–44)
AST SERPL-CCNC: 28 IU/L (ref 0–40)
BASOPHILS # BLD AUTO: 0.1 X10E3/UL (ref 0–0.2)
BASOPHILS NFR BLD AUTO: 1 %
BILIRUB SERPL-MCNC: 0.3 MG/DL (ref 0–1.2)
BUN SERPL-MCNC: 17 MG/DL (ref 8–27)
BUN/CREAT SERPL: 18 (ref 10–24)
CALCIUM SERPL-MCNC: 9.6 MG/DL (ref 8.6–10.2)
CHLORIDE SERPL-SCNC: 95 MMOL/L (ref 96–106)
CO2 SERPL-SCNC: 25 MMOL/L (ref 20–29)
CREAT SERPL-MCNC: 0.96 MG/DL (ref 0.76–1.27)
EOSINOPHIL # BLD AUTO: 0.5 X10E3/UL (ref 0–0.4)
EOSINOPHIL NFR BLD AUTO: 6 %
ERYTHROCYTE [DISTWIDTH] IN BLOOD BY AUTOMATED COUNT: 13.4 % (ref 12.3–15.4)
EST. AVERAGE GLUCOSE BLD GHB EST-MCNC: 212 MG/DL
GLOBULIN SER CALC-MCNC: 2.6 G/DL (ref 1.5–4.5)
GLUCOSE SERPL-MCNC: 221 MG/DL (ref 65–99)
HBA1C MFR BLD: 9 % (ref 4.8–5.6)
HCT VFR BLD AUTO: 44.1 % (ref 37.5–51)
HGB BLD-MCNC: 15.3 G/DL (ref 13–17.7)
IMM GRANULOCYTES # BLD AUTO: 0 X10E3/UL (ref 0–0.1)
IMM GRANULOCYTES NFR BLD AUTO: 0 %
LYMPHOCYTES # BLD AUTO: 2.6 X10E3/UL (ref 0.7–3.1)
LYMPHOCYTES NFR BLD AUTO: 32 %
MCH RBC QN AUTO: 29.4 PG (ref 26.6–33)
MCHC RBC AUTO-ENTMCNC: 34.7 G/DL (ref 31.5–35.7)
MCV RBC AUTO: 85 FL (ref 79–97)
MONOCYTES # BLD AUTO: 0.6 X10E3/UL (ref 0.1–0.9)
MONOCYTES NFR BLD AUTO: 8 %
NEUTROPHILS # BLD AUTO: 4.3 X10E3/UL (ref 1.4–7)
NEUTROPHILS NFR BLD AUTO: 53 %
PLATELET # BLD AUTO: 245 X10E3/UL (ref 150–450)
POTASSIUM SERPL-SCNC: 4.4 MMOL/L (ref 3.5–5.2)
PROT SERPL-MCNC: 6.6 G/DL (ref 6–8.5)
RBC # BLD AUTO: 5.21 X10E6/UL (ref 4.14–5.8)
SODIUM SERPL-SCNC: 136 MMOL/L (ref 134–144)
TSH SERPL DL<=0.005 MIU/L-ACNC: 1.7 UIU/ML (ref 0.45–4.5)
WBC # BLD AUTO: 8 X10E3/UL (ref 3.4–10.8)

## 2019-05-30 NOTE — PROGRESS NOTES
Labs stable  Nothing to explain syncopal events  A1c is above goal but improved from last visit; 9%    Please call and notify pt that his labs are stable  His sugars are running high with A1c of 9%

## 2019-05-31 ENCOUNTER — HOSPITAL ENCOUNTER (OUTPATIENT)
Dept: CT IMAGING | Age: 64
Discharge: HOME | End: 2019-05-31
Attending: FAMILY MEDICINE
Payer: MEDICAID

## 2019-05-31 ENCOUNTER — TELEPHONE (OUTPATIENT)
Dept: FAMILY MEDICINE CLINIC | Age: 64
End: 2019-05-31

## 2019-05-31 DIAGNOSIS — R55 SYNCOPE AND COLLAPSE: ICD-10-CM

## 2019-05-31 PROCEDURE — 70450 CT HEAD/BRAIN W/O DYE: CPT

## 2019-05-31 NOTE — TELEPHONE ENCOUNTER
Spoke with patient, two identifiers verified. Notified patient of lab results per Dr. Kenny Velarde. Patient denied any other concerns at this time.

## 2019-06-03 DIAGNOSIS — I10 ESSENTIAL HYPERTENSION: ICD-10-CM

## 2019-06-03 RX ORDER — LISINOPRIL 20 MG/1
20 TABLET ORAL DAILY
Qty: 90 TAB | Refills: 0 | Status: CANCELLED | OUTPATIENT
Start: 2019-06-03

## 2019-06-12 ENCOUNTER — HOSPITAL ENCOUNTER (OUTPATIENT)
Dept: NON INVASIVE DIAGNOSTICS | Age: 64
Discharge: HOME | End: 2019-06-12
Attending: FAMILY MEDICINE

## 2019-06-12 DIAGNOSIS — R55 SYNCOPE AND COLLAPSE: ICD-10-CM

## 2019-06-28 ENCOUNTER — OFFICE VISIT (OUTPATIENT)
Dept: FAMILY MEDICINE CLINIC | Age: 64
End: 2019-06-28

## 2019-06-28 VITALS
HEIGHT: 76 IN | SYSTOLIC BLOOD PRESSURE: 133 MMHG | TEMPERATURE: 98.2 F | WEIGHT: 242.2 LBS | OXYGEN SATURATION: 94 % | RESPIRATION RATE: 20 BRPM | BODY MASS INDEX: 29.49 KG/M2 | DIASTOLIC BLOOD PRESSURE: 76 MMHG | HEART RATE: 82 BPM

## 2019-06-28 DIAGNOSIS — J42 CHRONIC BRONCHITIS, UNSPECIFIED CHRONIC BRONCHITIS TYPE (HCC): Primary | ICD-10-CM

## 2019-06-28 DIAGNOSIS — E11.21 TYPE 2 DIABETES MELLITUS WITH DIABETIC NEPHROPATHY, WITH LONG-TERM CURRENT USE OF INSULIN (HCC): ICD-10-CM

## 2019-06-28 DIAGNOSIS — Z79.4 TYPE 2 DIABETES MELLITUS WITH DIABETIC NEPHROPATHY, WITH LONG-TERM CURRENT USE OF INSULIN (HCC): ICD-10-CM

## 2019-06-28 DIAGNOSIS — Z72.0 TOBACCO ABUSE: ICD-10-CM

## 2019-06-28 RX ORDER — CALCIUM CITRATE/VITAMIN D3 200MG-6.25
TABLET ORAL
Qty: 50 STRIP | Refills: 3 | Status: SHIPPED | OUTPATIENT
Start: 2019-06-28 | End: 2019-09-12 | Stop reason: SDUPTHER
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
## 2019-06-28 NOTE — PATIENT INSTRUCTIONS
Referral Order Notification. Summary of Care document document sent to Referred To Provider: Xander Sarmiento MD 
[address] [facility] [address] [place] have been referred to: 
Xander Sarmiento [address] [facility] [address] [id number] Phone: [phone] Fax: [phone]

## 2019-06-28 NOTE — PROGRESS NOTES
Chief Complaint   Patient presents with    Follow Up Chronic Condition     1. Have you been to the ER, urgent care clinic since your last visit? Hospitalized since your last visit? No    2. Have you seen or consulted any other health care providers outside of the [address] [place] [address] since your last visit? Include any pap smears or colon screening.  No  Visit Vitals  /76 (BP 1 Location: Left arm, BP Patient Position: Sitting)   Pulse 82   Temp 98.2 °F (36.8 °C) (Oral)   Resp 20   Ht 6' 4\" (1.93 m)   Wt 242 lb 3.2 oz (109.9 kg)   SpO2 94%   BMI 29.48 kg/m²     Health Maintenance Due   Topic Date Due    Pneumococcal 0-64 years (1 of 1 - PPSV23) [birth year]/1962    EYE EXAM RETINAL OR DILATED  07/12/1966

## 2019-07-26 RX ORDER — PEN NEEDLE, DIABETIC 29 G X1/2"
NEEDLE, DISPOSABLE MISCELLANEOUS
Qty: 50 PEN NEEDLE | Refills: 11 | Status: SHIPPED | OUTPATIENT
Start: 2019-07-26 | End: 2020-08-10

## 2019-08-13 NOTE — PROGRESS NOTES
Identified pt with two pt identifiers(name and ). Reviewed record in preparation for visit and have obtained necessary documentation. Chief Complaint   Patient presents with    Diabetes       Health Maintenance Due   Topic    EYE EXAM RETINAL OR DILATED        Visit Vitals  /74 (BP 1 Location: Right arm, BP Patient Position: Sitting)   Pulse 84   Temp 98 °F (36.7 °C) (Oral)   Resp 18   Ht 6' 4\" (1.93 m)   Wt 239 lb (108.4 kg)   SpO2 98%   BMI 29.09 kg/m²         Coordination of Care Questionnaire:  :   1) Have you been to an emergency room, urgent care, or hospitalized since your last visit? If yes, where when, and reason for visit? no       2. Have seen or consulted any other health care provider since your last visit? If yes, where when, and reason for visit? NO      3) Do you have an Advanced Directive/ Living Will in place? NO  If no, would you like information NO    Patient is accompanied by self I have received verbal consent from Viktoria Shepherd to discuss any/all medical information while they are present in the room.

## 2019-08-14 ENCOUNTER — OFFICE VISIT (OUTPATIENT)
Dept: FAMILY MEDICINE CLINIC | Age: 64
End: 2019-08-14

## 2019-08-14 VITALS
DIASTOLIC BLOOD PRESSURE: 74 MMHG | OXYGEN SATURATION: 98 % | SYSTOLIC BLOOD PRESSURE: 122 MMHG | BODY MASS INDEX: 29.1 KG/M2 | HEART RATE: 84 BPM | HEIGHT: 76 IN | WEIGHT: 239 LBS | RESPIRATION RATE: 18 BRPM | TEMPERATURE: 98 F

## 2019-08-14 DIAGNOSIS — E11.21 TYPE 2 DIABETES WITH NEPHROPATHY (HCC): ICD-10-CM

## 2019-08-14 DIAGNOSIS — Z79.4 TYPE 2 DIABETES MELLITUS WITH DIABETIC NEPHROPATHY, WITH LONG-TERM CURRENT USE OF INSULIN (HCC): Primary | ICD-10-CM

## 2019-08-14 DIAGNOSIS — E11.21 TYPE 2 DIABETES MELLITUS WITH DIABETIC NEPHROPATHY, WITH LONG-TERM CURRENT USE OF INSULIN (HCC): Primary | ICD-10-CM

## 2019-08-14 DIAGNOSIS — E66.3 OVERWEIGHT: ICD-10-CM

## 2019-08-14 DIAGNOSIS — Z72.0 TOBACCO ABUSE: ICD-10-CM

## 2019-08-14 LAB — HBA1C MFR BLD HPLC: 8.5 %

## 2019-08-14 NOTE — PATIENT INSTRUCTIONS
- Increase Basaglar to 35 units daily - I would still like for you to check your sugars before meals - Remember our goal A1c is < 7%
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
## 2019-08-23 DIAGNOSIS — J41.1 MUCOPURULENT CHRONIC BRONCHITIS (HCC): ICD-10-CM

## 2019-08-27 RX ORDER — FLUTICASONE PROPIONATE AND SALMETEROL XINAFOATE 115; 21 UG/1; UG/1
AEROSOL, METERED RESPIRATORY (INHALATION)
Qty: 12 INHALER | Refills: 3 | Status: SHIPPED | OUTPATIENT
Start: 2019-08-27 | End: 2020-09-09

## 2019-09-06 ENCOUNTER — TELEPHONE (OUTPATIENT)
Dept: FAMILY MEDICINE CLINIC | Age: 64
End: 2019-09-06

## 2019-09-06 NOTE — TELEPHONE ENCOUNTER
Attempted to contact patient in regards to BG readings. Patient unavailable, LVM to call the office.

## 2019-09-12 ENCOUNTER — TELEPHONE (OUTPATIENT)
Dept: FAMILY MEDICINE CLINIC | Age: 64
End: 2019-09-12

## 2019-09-12 DIAGNOSIS — R00.2 PALPITATIONS: Primary | ICD-10-CM

## 2019-09-12 PROBLEM — R94.31 ABNORMAL HOLTER MONITOR FINDING: Status: ACTIVE | Noted: 2019-09-12

## 2019-09-12 NOTE — TELEPHONE ENCOUNTER
Received final result of Holter monitor. Atrial fibrillation was observed, EP evaluation recommended. Will call patient with result and place referral for EP.     Madiha Guerrero MD

## 2019-10-02 DIAGNOSIS — Z72.0 TOBACCO ABUSE: ICD-10-CM

## 2019-10-02 DIAGNOSIS — J44.1 COPD EXACERBATION (HCC): ICD-10-CM

## 2019-10-02 RX ORDER — VARENICLINE TARTRATE 25 MG
0.5 KIT ORAL
Qty: 1 DOSE PACK | Refills: 0 | Status: SHIPPED | OUTPATIENT
Start: 2019-10-02 | End: 2019-12-13 | Stop reason: DRUGHIGH

## 2019-10-02 RX ORDER — ALBUTEROL SULFATE 90 UG/1
AEROSOL, METERED RESPIRATORY (INHALATION)
Qty: 3 INHALER | Refills: 2 | Status: SHIPPED | OUTPATIENT
Start: 2019-10-02 | End: 2020-05-05

## 2019-10-15 ENCOUNTER — TELEPHONE (OUTPATIENT)
Dept: FAMILY MEDICINE CLINIC | Age: 64
End: 2019-10-15

## 2019-10-15 NOTE — TELEPHONE ENCOUNTER
Patient states pharmacy told him they didn't receive his medication. I verified pharmacy on file and he stated we have correct information. Disp Refills Start End    varenicline (CHANTIX STARTER PAUL) 0.5 mg (11)- 1 mg (42) DsPk 1 Dose Pack 0 10/2/2019     Sig - Route: Take 0.5 mg by mouth two (2) times daily (after meals).  - Oral    Sent to pharmacy as: varenicline (CHANTIX STARTER PAUL) 0.5 mg (11)- 1 mg (42) DsPk    E-Prescribing Status: Receipt confirmed by pharmacy (10/2/2019  5:56 PM EDT)

## 2019-10-15 NOTE — TELEPHONE ENCOUNTER
[address] now calling about this message.     Nurse [facility] call

## 2019-11-04 ENCOUNTER — TELEPHONE (OUTPATIENT)
Dept: FAMILY MEDICINE CLINIC | Age: 64
End: 2019-11-04

## 2019-11-04 NOTE — TELEPHONE ENCOUNTER
Attempted to call, Number given is not a direct number and gives a call menu. We need an ext or which department she is in if she calls back.

## 2019-11-04 NOTE — TELEPHONE ENCOUNTER
Susannah with [facility],    Asking that you call her back directly at [phone] to discuss annual medications review

## 2019-11-07 NOTE — TELEPHONE ENCOUNTER
This writer attempted to  with [facility] in regard to patient. This Shar Leaver was not able to reach a representative at this time nor leave a voicemail to receive a return call. This writer will contact [facility] at a later time.

## 2019-11-12 RX ORDER — METFORMIN HYDROCHLORIDE 1000 MG/1
TABLET ORAL
Qty: 60 TAB | Refills: 11 | Status: SHIPPED | OUTPATIENT
Start: 2019-11-12 | End: 2020-11-17

## 2019-12-13 ENCOUNTER — OFFICE VISIT (OUTPATIENT)
Dept: FAMILY MEDICINE CLINIC | Age: 64
End: 2019-12-13

## 2019-12-13 VITALS
OXYGEN SATURATION: 95 % | TEMPERATURE: 98.2 F | DIASTOLIC BLOOD PRESSURE: 79 MMHG | HEART RATE: 91 BPM | WEIGHT: 242 LBS | HEIGHT: 76 IN | BODY MASS INDEX: 29.47 KG/M2 | SYSTOLIC BLOOD PRESSURE: 125 MMHG | RESPIRATION RATE: 18 BRPM

## 2019-12-13 DIAGNOSIS — I48.91 ATRIAL FIBRILLATION, UNSPECIFIED TYPE (HCC): ICD-10-CM

## 2019-12-13 DIAGNOSIS — Z79.4 TYPE 2 DIABETES MELLITUS WITH DIABETIC NEPHROPATHY, WITH LONG-TERM CURRENT USE OF INSULIN (HCC): Primary | ICD-10-CM

## 2019-12-13 DIAGNOSIS — Z79.4 TYPE 2 DIABETES MELLITUS WITH DIABETIC NEUROPATHY, WITH LONG-TERM CURRENT USE OF INSULIN (HCC): ICD-10-CM

## 2019-12-13 DIAGNOSIS — E11.21 TYPE 2 DIABETES MELLITUS WITH DIABETIC NEPHROPATHY, WITH LONG-TERM CURRENT USE OF INSULIN (HCC): Primary | ICD-10-CM

## 2019-12-13 DIAGNOSIS — E11.40 TYPE 2 DIABETES MELLITUS WITH DIABETIC NEUROPATHY, WITH LONG-TERM CURRENT USE OF INSULIN (HCC): ICD-10-CM

## 2019-12-13 DIAGNOSIS — Z72.0 TOBACCO ABUSE: ICD-10-CM

## 2019-12-13 LAB
ALBUMIN UR QL STRIP: ABNORMAL MG/L
CREATININE, URINE POC: ABNORMAL MG/DL
MICROALBUMIN/CREAT RATIO POC: >300 MG/G

## 2019-12-13 RX ORDER — VARENICLINE TARTRATE 1 MG/1
TABLET, FILM COATED ORAL
Qty: 60 TAB | Refills: 2 | Status: SHIPPED | OUTPATIENT
Start: 2019-12-13 | End: 2020-03-12

## 2019-12-13 NOTE — PATIENT INSTRUCTIONS
Deciding About Using Medicines To Quit Smoking How can you decide about using medicines to quit smoking? What are the medicines you can use? Your doctor may prescribe varenicline (Chantix) or bupropion (Zyban). These medicines can help you cope with cravings for tobacco. They are pills that don't contain nicotine. You also can use nicotine replacement products. These do contain nicotine. There are many types. · Gum and lozenges slowly release nicotine into your mouth. · Patches stick to your skin. They slowly release nicotine into your bloodstream. 
· An inhaler has a gudino that contains nicotine. You breathe in a puff of nicotine vapor through your mouth and throat. · Nasal spray releases a mist that contains nicotine. What are key points about this decision? · Using medicines can double your chances of quitting smoking. They can ease cravings and withdrawal symptoms. · Getting counseling along with using medicine can raise your chances of quitting even more. · If you smoke fewer than 5 cigarettes a day, you may not need medicines to help you quit smoking. · These medicines have less nicotine than cigarettes. And by itself, nicotine is not nearly as harmful as smoking. The tars, carbon monoxide, and other toxic chemicals in tobacco cause the harmful effects. · The side effects of nicotine replacement products depend on the type of product. For example, a patch can make your skin red and itchy. Medicines in pill form can make you sick to your stomach. They can also cause dry mouth and trouble sleeping. For most people, the side effects are not bad enough to make them stop using the products. Why might you choose to use medicines to quit smoking? · You have tried on your own to stop smoking, but you were not able to stop. · You smoke more than 5 cigarettes a day. · You want to increase your chances of quitting smoking. · You want to reduce your cravings and withdrawal symptoms. · You feel the benefits of medicine outweigh the side effects. Why might you choose not to use medicine? · You want to try quitting on your own by stopping all at once (\"cold turkey\"). · You want to cut back slowly on the number of cigarettes you smoke. · You smoke fewer than 5 cigarettes a day. · You do not like using medicine. · You feel the side effects of medicines outweigh the benefits. · You are worried about the cost of medicines. Your decision Thinking about the facts and your feelings can help you make a decision that is right for you. Be sure you understand the benefits and risks of your options, and think about what else you need to do before you make the decision. Where can you learn more? Go to http://jaz-bhavna.info/. Enter G061 in the search box to learn more about \"Deciding About Using Medicines To Quit Smoking. \" Current as of: September 26, 2018 Content Version: 12.2 © 8774-5955 [employer]. Care instructions adapted under license by [employer] (which disclaims liability or warranty for this information). If you have questions about a medical condition or this instruction, always ask your healthcare professional. [place] [address] any warranty or liability for your use of this information.

## 2019-12-13 NOTE — PROGRESS NOTES
Identified pt with two pt identifiers(name and ). Reviewed record in preparation for visit and have obtained necessary documentation. Chief Complaint   Patient presents with    Diabetes        Health Maintenance Due   Topic    EYE EXAM RETINAL OR DILATED     Shingrix Vaccine Age 49> (1 of 2)    Influenza Age 5 to Adult     FOOT EXAM Q1     LIPID PANEL Q1     MICROALBUMIN Q1        Visit Vitals  /79 (BP 1 Location: Left arm, BP Patient Position: Sitting)   Pulse 91   Temp 98.2 °F (36.8 °C) (Oral)   Resp 18   Ht 6' 4\" (1.93 m)   Wt 242 lb (109.8 kg)   SpO2 95%   BMI 29.46 kg/m²         Coordination of Care Questionnaire:  :   1) Have you been to an emergency room, urgent care, or hospitalized since your last visit? If yes, where when, and reason for visit? no       2. Have seen or consulted any other health care provider since your last visit? If yes, where when, and reason for visit? NO      3) Do you have an Advanced Directive/ Living Will in place? NO  If yes, do we have a copy on file NO  If no, would you like information NO    Patient is accompanied by self I have received verbal consent from Ignacio Boyd to discuss any/all medical information while they are present in the room.
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
## 2019-12-14 LAB
BUN SERPL-MCNC: 20 MG/DL (ref 8–27)
BUN/CREAT SERPL: 22 (ref 10–24)
CALCIUM SERPL-MCNC: 9.5 MG/DL (ref 8.6–10.2)
CHLORIDE SERPL-SCNC: 97 MMOL/L (ref 96–106)
CHOLEST SERPL-MCNC: 181 MG/DL (ref 100–199)
CO2 SERPL-SCNC: 22 MMOL/L (ref 20–29)
CREAT SERPL-MCNC: 0.89 MG/DL (ref 0.76–1.27)
EST. AVERAGE GLUCOSE BLD GHB EST-MCNC: 180 MG/DL
GLUCOSE SERPL-MCNC: 236 MG/DL (ref 65–99)
HBA1C MFR BLD: 7.9 % (ref 4.8–5.6)
HDLC SERPL-MCNC: 52 MG/DL
INTERPRETATION, 910389: NORMAL
LDLC SERPL CALC-MCNC: 88 MG/DL (ref 0–99)
Lab: NORMAL
POTASSIUM SERPL-SCNC: 4.7 MMOL/L (ref 3.5–5.2)
SODIUM SERPL-SCNC: 136 MMOL/L (ref 134–144)
TRIGL SERPL-MCNC: 204 MG/DL (ref 0–149)
VLDLC SERPL CALC-MCNC: 41 MG/DL (ref 5–40)

## 2019-12-16 NOTE — PROGRESS NOTES
A1c less than 8!! Not at goal but the best its been all year. Lipids good; pt cont to refuse statin    Please call and notify pt that his A1c is 7.9%!   Remaining labs are stable  Let us know if labs need to be sent to his dentist

## 2019-12-17 ENCOUNTER — TELEPHONE (OUTPATIENT)
Dept: FAMILY MEDICINE CLINIC | Age: 64
End: 2019-12-17

## 2019-12-18 ENCOUNTER — TELEPHONE (OUTPATIENT)
Dept: CARDIOLOGY CLINIC | Age: 64
End: 2019-12-18

## 2019-12-18 NOTE — TELEPHONE ENCOUNTER
Attempted to contact patient to see if he have scheduled an appointment with EP at his Cardiologist. Patient unavailable, LVM to call the office.

## 2019-12-18 NOTE — TELEPHONE ENCOUNTER
Patient returned call to nurse Annis Lundborg, nurse not available. Asked patient about appointment, he states he has not but will call to make the appointment right now.

## 2019-12-18 NOTE — TELEPHONE ENCOUNTER
Patient would like to schedule an appt with Dr. Mey Zaldivar per his PCP's recommendation.      Phone: [phone]

## 2020-01-10 ENCOUNTER — OFFICE VISIT (OUTPATIENT)
Dept: CARDIOLOGY CLINIC | Age: 65
End: 2020-01-10

## 2020-01-10 VITALS
OXYGEN SATURATION: 96 % | WEIGHT: 240.6 LBS | RESPIRATION RATE: 20 BRPM | BODY MASS INDEX: 29.3 KG/M2 | HEART RATE: 82 BPM | DIASTOLIC BLOOD PRESSURE: 72 MMHG | HEIGHT: 76 IN | SYSTOLIC BLOOD PRESSURE: 140 MMHG

## 2020-01-10 DIAGNOSIS — R06.02 SOB (SHORTNESS OF BREATH): ICD-10-CM

## 2020-01-10 DIAGNOSIS — R55 SYNCOPE AND COLLAPSE: ICD-10-CM

## 2020-01-10 DIAGNOSIS — E11.21 TYPE 2 DIABETES WITH NEPHROPATHY (HCC): ICD-10-CM

## 2020-01-10 DIAGNOSIS — I10 ESSENTIAL HYPERTENSION: ICD-10-CM

## 2020-01-10 DIAGNOSIS — I48.91 ATRIAL FIBRILLATION, UNSPECIFIED TYPE (HCC): Primary | ICD-10-CM

## 2020-01-10 DIAGNOSIS — J41.0 SIMPLE CHRONIC BRONCHITIS (HCC): ICD-10-CM

## 2020-01-10 RX ORDER — LISINOPRIL AND HYDROCHLOROTHIAZIDE 12.5; 2 MG/1; MG/1
1 TABLET ORAL DAILY
COMMUNITY
Start: 2020-01-04 | End: 2020-07-13

## 2020-01-10 NOTE — PROGRESS NOTES
ROOM # 2  Holter:6/12-6/17/19  SOB, occasional sharp pain left ribs  Visit Vitals  /72 (BP 1 Location: Left arm, BP Patient Position: Sitting)   Pulse 82   Resp 20   Ht 6' 4\" (1.93 m)   Wt 240 lb 9.6 oz (109.1 kg)   SpO2 96%   BMI 29.29 kg/m²     Eliquis 5 mg samples givent o patient per verbal order Dr. Pelon Guidry. Free 30 day Eliquis card also given to patient.
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
## 2020-01-11 ENCOUNTER — DOCUMENTATION ONLY (OUTPATIENT)
Dept: CARDIOLOGY CLINIC | Age: 65
End: 2020-01-11

## 2020-01-30 ENCOUNTER — TELEPHONE (OUTPATIENT)
Dept: FAMILY MEDICINE CLINIC | Age: 65
End: 2020-01-30

## 2020-01-30 NOTE — TELEPHONE ENCOUNTER
Returned call to patient and left voicemail for him to call us back. Need to clarify what he needs as we do not prescribe his Eliquis, looks like a Loco Riley MD is the one that last prescribed it.

## 2020-02-05 ENCOUNTER — TELEPHONE (OUTPATIENT)
Dept: CARDIOLOGY CLINIC | Age: 65
End: 2020-02-05

## 2020-02-05 ENCOUNTER — TELEPHONE (OUTPATIENT)
Dept: FAMILY MEDICINE CLINIC | Age: 65
End: 2020-02-05

## 2020-02-05 NOTE — TELEPHONE ENCOUNTER
Radha from [facility] is calling to discuss the patients Eliquis prescription. He is having a dental procedure and will need to stop the medication 2 days prior and proceed when safe.      Phone: [phone]

## 2020-02-05 NOTE — TELEPHONE ENCOUNTER
Returned call to [address], no answer. Per MAY Roblero NP patient may hold Eliquis for 48 hours prior to dental procedure and resume as soon as safely possible.

## 2020-02-05 NOTE — TELEPHONE ENCOUNTER
Patient is schedule for an upcoming dental procedure. Patient takes Eliquis 5 mg 2 times a day. Writer contacted Dr Carolee Luis office (Cardiology) to discuss patients Eliquis prescription protocal prior to surgery.  Awaiting return call from patients cardiologist office

## 2020-02-06 DIAGNOSIS — J41.1 MUCOPURULENT CHRONIC BRONCHITIS (HCC): ICD-10-CM

## 2020-02-06 NOTE — TELEPHONE ENCOUNTER
[facility] returned call to the nurse. She said it is OK to hold off on eliquis for 48 hours prior to dental surgery and to resume as soon as possible.

## 2020-02-06 NOTE — TELEPHONE ENCOUNTER
Called and spoke with Sanket Estrada from [facility], Sanket Estrada states that [employer] was out of the office on 02/06/2020, and will leave a message in the pt chart regarding the approval to hold eliquis 48 hr prior to dental procedure but to resume medication as soon as possible in a safely manner. I informed her that if she had any questions or concerns to please give the office a call back. Sanket Estrada voiced understanding.

## 2020-02-07 NOTE — TELEPHONE ENCOUNTER
Returned call to patient. ID confirmed x 2. We discussed his recent afib dx and ELiquis. He is worried the Eliquis is raising his blood sugars. Assured him it is not likely the Eliquis. He is reluctant to continue but we discussed the importance of compliance to that medication. If any continued concerns, we can discuss at next visit or he can follow up with Dr. Earl Naranjo.      Clint Wheatley MD

## 2020-02-10 DIAGNOSIS — J41.0 SIMPLE CHRONIC BRONCHITIS (HCC): ICD-10-CM

## 2020-02-12 NOTE — TELEPHONE ENCOUNTER
Lauryn Nielsen with [place] [address] calling in regards to the clearance for a dental procedure on 3/11/20.  Please advise    Phone:[phone]  [phone]

## 2020-02-12 NOTE — TELEPHONE ENCOUNTER
Returned call to [facility], no answer. Left message to have her return call to [phone].

## 2020-02-13 NOTE — TELEPHONE ENCOUNTER
Received call from Alfred Sanchez @ [facility] dental,patient ID verified using two patient identifiers. Informed her that I had not received any forms from Gideon Jaimes for this patient. She will refax forms to [phone]. Patient is seeing L. Gladies Gottron NP tomorrow, she will complete forms once she has seen patient.

## 2020-02-14 ENCOUNTER — DOCUMENTATION ONLY (OUTPATIENT)
Dept: CARDIOLOGY CLINIC | Age: 65
End: 2020-02-14

## 2020-02-14 ENCOUNTER — OFFICE VISIT (OUTPATIENT)
Dept: CARDIOLOGY CLINIC | Age: 65
End: 2020-02-14

## 2020-02-14 VITALS
BODY MASS INDEX: 29.35 KG/M2 | WEIGHT: 241 LBS | RESPIRATION RATE: 16 BRPM | OXYGEN SATURATION: 96 % | HEART RATE: 90 BPM | HEIGHT: 76 IN | DIASTOLIC BLOOD PRESSURE: 82 MMHG | SYSTOLIC BLOOD PRESSURE: 128 MMHG

## 2020-02-14 DIAGNOSIS — R55 SYNCOPE AND COLLAPSE: ICD-10-CM

## 2020-02-14 DIAGNOSIS — E11.21 TYPE 2 DIABETES WITH NEPHROPATHY (HCC): ICD-10-CM

## 2020-02-14 DIAGNOSIS — I10 ESSENTIAL HYPERTENSION: ICD-10-CM

## 2020-02-14 DIAGNOSIS — I48.91 ATRIAL FIBRILLATION, UNSPECIFIED TYPE (HCC): Primary | ICD-10-CM

## 2020-02-14 PROBLEM — I48.0 PAROXYSMAL ATRIAL FIBRILLATION (HCC): Status: ACTIVE | Noted: 2019-09-12
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
## 2020-02-14 NOTE — PROGRESS NOTES
Faxed completed surgical clearance form, last office note, last EKG and  Most recent labs to [place] [id number]  [phone]. Confirmation received.

## 2020-02-14 NOTE — PROGRESS NOTES
Chief Complaint   Patient presents with    Irregular Heart Beat    Follow-up     1 month     Visit Vitals  /82 (BP 1 Location: Left arm, BP Patient Position: Sitting)   Pulse 90   Resp 16   Ht 6' 4\" (1.93 m)   Wt 241 lb (109.3 kg)   SpO2 96%   BMI 29.34 kg/m²     Patient presents to office with no complaints of pain, swelling, dizziness, or chest pain. SOB on exertion eases with rest. Needs refills today, out of eliquis. No recent hospitalizations reported.    Michele Salas LPN

## 2020-02-24 ENCOUNTER — TELEPHONE (OUTPATIENT)
Dept: CARDIOLOGY CLINIC | Age: 65
End: 2020-02-24

## 2020-02-24 NOTE — TELEPHONE ENCOUNTER
[facility] calling in regards to the clearance.  Please advise      Phone:[phone]  Fax [phone]

## 2020-02-24 NOTE — TELEPHONE ENCOUNTER
Returned call, no answer. Left VM to have call returned to [phone]. Clearance was faxed on  2/14/2020 to [phone].

## 2020-03-02 NOTE — TELEPHONE ENCOUNTER
Called [facility], spoke with Alfred Sanchez, she states they never received clearance forms for patient. Advised her that I faxed them on 2/14/20 to [phone] with a confirmation received. She ask that forms be re faxed. Forms re faxed to [phone] as requested. Confirmation # 300 [facility].

## 2020-03-13 ENCOUNTER — TELEPHONE (OUTPATIENT)
Dept: CARDIOLOGY CLINIC | Age: 65
End: 2020-03-13

## 2020-03-13 NOTE — TELEPHONE ENCOUNTER
Received call from [facility] with Dr. Francisco Link @ [address]. Patient ID verified using two patient identifiers. [facility] states that patient had a procedure and was to restart his Eliquis the next day. Dr. Francisco Link called patient to follow up and confirm that he had restarted the Eliquis. Per [facility] patient told Dr. Francisco Link that he has no intentions of restarting his Eliquis. Informed [facility] that I would relay information to Dr. Jesus Cintron and AMY Nettles NP.

## 2020-03-13 NOTE — TELEPHONE ENCOUNTER
Marilia Espino NP aware that patient no longer taking Eliquis and will discuss with him at his next appointment.      Future Appointments   Date Time Provider Chino Hou   4/9/2020 11:00 AM Fabrizio Norton NP [address] [place] [address]

## 2020-04-07 ENCOUNTER — TELEPHONE (OUTPATIENT)
Dept: CARDIOLOGY CLINIC | Age: 65
End: 2020-04-07

## 2020-04-07 NOTE — TELEPHONE ENCOUNTER
Called pt regarding his appt on Thur 4/9 with Rosa M Raines NP for f/u to loop monitor. Spoke to pt & he never started wearing the monitor b/c he was having dental issues. Asked him if he was going to start now & he said he will call Gladys to see if he can use the same monitor or if I need to order another. If he does start it will be another wk before he does. Told him that since he didn't do the monitor then he wouldn't need the appt since she wont have the data. He will cxl appt on 4/9 & call back after he talks to Gladsy then will make appts.

## 2020-04-23 ENCOUNTER — TELEPHONE (OUTPATIENT)
Dept: FAMILY MEDICINE CLINIC | Age: 65
End: 2020-04-23

## 2020-04-23 NOTE — TELEPHONE ENCOUNTER
I called [facility] to touched base with them in regards to any needs they may have. No answer.  Unable to leave       Franklyn Bonilla MD

## 2020-06-03 ENCOUNTER — TELEPHONE (OUTPATIENT)
Dept: FAMILY MEDICINE CLINIC | Age: 65
End: 2020-06-03

## 2020-06-03 NOTE — TELEPHONE ENCOUNTER
I called [facility] to touched base with them in regards to any needs they may have. Pt called 3 times and unable to reach. This outreach was a proactive measure to see if pt had any acute concerns or needs medication refill.       El Grossman, DO

## 2020-07-13 RX ORDER — LISINOPRIL AND HYDROCHLOROTHIAZIDE 12.5; 2 MG/1; MG/1
TABLET ORAL
Qty: 90 TAB | Refills: 0 | Status: SHIPPED | OUTPATIENT
Start: 2020-07-13 | End: 2020-09-29

## 2020-09-09 DIAGNOSIS — J41.1 MUCOPURULENT CHRONIC BRONCHITIS (HCC): ICD-10-CM

## 2020-09-09 RX ORDER — CALCIUM CITRATE/VITAMIN D3 200MG-6.25
TABLET ORAL
Qty: 100 STRIP | Refills: 3 | Status: SHIPPED | OUTPATIENT
Start: 2020-09-09 | End: 2022-01-07

## 2020-09-09 RX ORDER — GLIPIZIDE 10 MG/1
TABLET ORAL
Qty: 60 TAB | Refills: 0 | Status: SHIPPED | OUTPATIENT
Start: 2020-09-09 | End: 2020-11-17

## 2020-09-09 RX ORDER — FLUTICASONE PROPIONATE AND SALMETEROL XINAFOATE 115; 21 UG/1; UG/1
AEROSOL, METERED RESPIRATORY (INHALATION)
Qty: 1 INHALER | Refills: 3 | Status: SHIPPED | OUTPATIENT
Start: 2020-09-09 | End: 2020-11-17

## 2020-09-10 ENCOUNTER — TELEPHONE (OUTPATIENT)
Dept: FAMILY MEDICINE CLINIC | Age: 65
End: 2020-09-10

## 2020-09-10 NOTE — TELEPHONE ENCOUNTER
----- Message from Sil Beckwith sent at 9/8/2020  2:41 PM EDT -----  Regarding: DR Arvis Ormond  / [facility] Message/Vendor Calls    Pt is requesting to speak with nurse in regard to being exposed to a co worker that tested positive for COVID-19. He does not having any symptoms at this time. Declined scheduling virtual visit. Requesting to be tested as soon possible.            Callback required   Best contact number(s):  [phone]          Sil Beckwith

## 2020-09-11 NOTE — TELEPHONE ENCOUNTER
Spoke with Patient and he went to [facility] and got tested yesterday.  Informed him I would call back next week to see what his results were and then we will schedule a Visit with

## 2020-09-29 RX ORDER — LISINOPRIL AND HYDROCHLOROTHIAZIDE 12.5; 2 MG/1; MG/1
TABLET ORAL
Qty: 90 TAB | Refills: 0 | Status: SHIPPED | OUTPATIENT
Start: 2020-09-29 | End: 2020-11-17

## 2020-10-28 ENCOUNTER — VIRTUAL VISIT (OUTPATIENT)
Dept: FAMILY MEDICINE CLINIC | Age: 65
End: 2020-10-28
Payer: MEDICAID

## 2020-10-28 DIAGNOSIS — Z79.4 TYPE 2 DIABETES MELLITUS WITH DIABETIC NEPHROPATHY, WITH LONG-TERM CURRENT USE OF INSULIN (HCC): ICD-10-CM

## 2020-10-28 DIAGNOSIS — Z72.0 TOBACCO ABUSE: ICD-10-CM

## 2020-10-28 DIAGNOSIS — I10 ESSENTIAL HYPERTENSION: Primary | ICD-10-CM

## 2020-10-28 DIAGNOSIS — R45.89 DEPRESSED MOOD: ICD-10-CM

## 2020-10-28 DIAGNOSIS — E11.21 TYPE 2 DIABETES MELLITUS WITH DIABETIC NEPHROPATHY, WITH LONG-TERM CURRENT USE OF INSULIN (HCC): ICD-10-CM

## 2020-10-28 DIAGNOSIS — J41.0 SIMPLE CHRONIC BRONCHITIS (HCC): ICD-10-CM

## 2020-10-28 PROCEDURE — 99442 PR PHYS/QHP TELEPHONE EVALUATION 11-20 MIN: CPT | Performed by: FAMILY MEDICINE

## 2020-10-28 RX ORDER — VARENICLINE TARTRATE 1 MG/1
TABLET, FILM COATED ORAL
COMMUNITY
Start: 2020-10-14 | End: 2021-11-03
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
## 2020-10-28 NOTE — Clinical Note
Carlos Alberto Reed,    I know this will be a challenge. .. But I want to try and get Mr. Dileep Laguna an in person visit next month. Do I happen to have any openings? Would be for labs and to address his diabetes.      CL[place]

## 2020-11-16 DIAGNOSIS — J44.1 COPD EXACERBATION (HCC): ICD-10-CM

## 2020-11-16 DIAGNOSIS — J41.1 MUCOPURULENT CHRONIC BRONCHITIS (HCC): ICD-10-CM

## 2020-11-17 RX ORDER — ALBUTEROL SULFATE 90 UG/1
AEROSOL, METERED RESPIRATORY (INHALATION)
Qty: 9 G | Refills: 0 | Status: SHIPPED | OUTPATIENT
Start: 2020-11-17 | End: 2021-01-11

## 2020-11-17 RX ORDER — METFORMIN HYDROCHLORIDE 1000 MG/1
TABLET ORAL
Qty: 180 TAB | Refills: 3 | Status: SHIPPED | OUTPATIENT
Start: 2020-11-17 | End: 2022-01-07

## 2020-11-17 RX ORDER — LISINOPRIL AND HYDROCHLOROTHIAZIDE 12.5; 2 MG/1; MG/1
TABLET ORAL
Qty: 90 TAB | Refills: 3 | Status: SHIPPED | OUTPATIENT
Start: 2020-11-17 | End: 2021-12-10

## 2020-11-17 RX ORDER — PEN NEEDLE, DIABETIC 29 G X1/2"
NEEDLE, DISPOSABLE MISCELLANEOUS
Qty: 100 PEN NEEDLE | Refills: 3 | Status: SHIPPED | OUTPATIENT
Start: 2020-11-17 | End: 2021-08-18 | Stop reason: SDUPTHER

## 2020-11-17 RX ORDER — FLUTICASONE PROPIONATE AND SALMETEROL XINAFOATE 115; 21 UG/1; UG/1
AEROSOL, METERED RESPIRATORY (INHALATION)
Qty: 12 INHALER | Refills: 3 | Status: SHIPPED | OUTPATIENT
Start: 2020-11-17 | End: 2021-03-16

## 2020-11-17 RX ORDER — GLIPIZIDE 10 MG/1
TABLET ORAL
Qty: 180 TAB | Refills: 3 | Status: SHIPPED | OUTPATIENT
Start: 2020-11-17 | End: 2022-01-07

## 2020-11-19 DIAGNOSIS — Z79.4 TYPE 2 DIABETES MELLITUS WITH DIABETIC NEPHROPATHY, WITH LONG-TERM CURRENT USE OF INSULIN (HCC): Primary | ICD-10-CM

## 2020-11-19 DIAGNOSIS — E11.21 TYPE 2 DIABETES MELLITUS WITH DIABETIC NEPHROPATHY, WITH LONG-TERM CURRENT USE OF INSULIN (HCC): Primary | ICD-10-CM

## 2020-11-19 RX ORDER — INSULIN GLARGINE 100 [IU]/ML
33 INJECTION, SOLUTION SUBCUTANEOUS
Qty: 30 ML | Refills: 3 | Status: SHIPPED | OUTPATIENT
Start: 2020-11-19 | End: 2021-02-17

## 2020-11-23 ENCOUNTER — OFFICE VISIT (OUTPATIENT)
Dept: FAMILY MEDICINE CLINIC | Age: 65
End: 2020-11-23
Payer: MEDICAID

## 2020-11-23 VITALS
TEMPERATURE: 97.3 F | WEIGHT: 239.13 LBS | DIASTOLIC BLOOD PRESSURE: 73 MMHG | HEART RATE: 84 BPM | BODY MASS INDEX: 29.11 KG/M2 | SYSTOLIC BLOOD PRESSURE: 119 MMHG | RESPIRATION RATE: 16 BRPM | OXYGEN SATURATION: 95 %

## 2020-11-23 DIAGNOSIS — L98.9 SKIN LESION OF HAND: ICD-10-CM

## 2020-11-23 DIAGNOSIS — B35.1 ONYCHOMYCOSIS: ICD-10-CM

## 2020-11-23 DIAGNOSIS — I10 ESSENTIAL HYPERTENSION: ICD-10-CM

## 2020-11-23 DIAGNOSIS — Z79.4 TYPE 2 DIABETES MELLITUS WITH DIABETIC NEPHROPATHY, WITH LONG-TERM CURRENT USE OF INSULIN (HCC): Primary | ICD-10-CM

## 2020-11-23 DIAGNOSIS — E11.21 TYPE 2 DIABETES MELLITUS WITH DIABETIC NEPHROPATHY, WITH LONG-TERM CURRENT USE OF INSULIN (HCC): Primary | ICD-10-CM

## 2020-11-23 PROBLEM — M54.16 LUMBAR RADICULOPATHY: Status: ACTIVE | Noted: 2020-11-23

## 2020-11-23 PROCEDURE — 99214 OFFICE O/P EST MOD 30 MIN: CPT | Performed by: FAMILY MEDICINE

## 2020-11-23 RX ORDER — INSULIN GLARGINE 100 [IU]/ML
INJECTION, SOLUTION SUBCUTANEOUS
COMMUNITY
End: 2021-08-12
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
## 2020-11-23 NOTE — PATIENT INSTRUCTIONS
Eye Doctor Referral:  
 
[facility]hthal[facility] Alexis Kos DO [address], [place], [address] Phone: [phone] [employer] 
(Numerous locations; see web site) [address] Muriel Gomes [facility], [address] [place], [address] [facility] 
[phone] Www. [employer] Learning About Foot and Toenail Care Foot and toenail care: Overview Checking your loved one's feet and keeping them clean and soft can help prevent cracks and infection in the skin. This is especially important for people who have diabetes. Keeping toenails trimmedand polished if that's what the person likesalso helps the person feel well-groomed. If the person you care for has diabetes or has foot problems, such as bad bunions and corns, think about taking them to see a podiatrist. This is a doctor who specializes in the care of the feet. Sometimes a podiatrist will come to the home if the person can't go out for visits. Try to take the person for salon pedicures if that is what they want. It's a chance to get out and see people and continue a favorite activity. You can do basic nail care at home. Usually all you need to do is keep the nails clean and at a safe length. How do you trim someone's toenails? Try to trim the person's nails every week. Or check the nails each week to see if they need to be trimmed. It's easiest to trim nails after the person has had a shower or foot bath. It makes the nails softer and easier to trim. Start by gathering your supplies. You will need toenail clippers and a nail file. You may also need nail polish and nail polish remover. To trim the nails: 
1. Wash and dry your hands. You don't need to wear gloves. 2. Use nail polish remover to take off any polish. 3. Hold the person's foot and toe steady with one hand while you trim the nail with your other hand. Trim the nails straight across.  Leave the nails a little longer at the corners so that the sharp ends don't cut into the skin. 4. Keep the nails no longer than the tip of the toes. 5. Let the nails dry if they are still damp and soft. 6. Use a nail file to gently smooth the edges of the nails, especially at the corners. They may be sharp after the nails are cut straight. 7. Apply nail polish, if the person wants it. If the person's nails are thick and discolored, it may be safest to have a podiatrist cut them. What else do you need to know? When you're caring for someone's nails, it is important to remember not to trim or cut the cuticles. A minor cut in a cuticle could lead to an infection. Wash the feet daily in the shower or bath or in a basin made for washing feet. It's extra important to wash the feet carefully if the person has diabetes. After washing the feet, dry gently. Put lotion on the feet, especially on the heels. But don't put it between the toes. If the person doesn't have diabetes and you see signs of athlete's foot (such as dry, cracking, or itchy skin between the toes), you can try an over-the-counter medicine. These medicines can kill the fungus that causes athlete's foot. If the problem doesn't go away, talk to the person's doctor. Look every day for cuts or signs of infection, such as pain, swelling, redness, or warmth. If you see any of these signsespecially in someone who has diabetescall the doctor. Where can you learn more? Go to http://www.gray.com/ Enter A726 in the search box to learn more about \"Learning About Foot and Toenail Care. \" Current as of: December 9, 2019               Content Version: 12.6 © 3235-6215 [employer]. Care instructions adapted under license by [employer] (which disclaims liability or warranty for this information).  If you have questions about a medical condition or this instruction, always ask your healthcare professional. [address] any warranty or liability for your use of this information.

## 2020-11-23 NOTE — PROGRESS NOTES
Chief Complaint   Patient presents with    Follow Up Chronic Condition     Patient presents to follow up on diabetes mellitus. Visit Vitals  /73 (BP 1 Location: Left arm, BP Patient Position: Sitting)   Pulse 84   Temp 97.3 °F (36.3 °C) (Temporal)   Resp 16   Wt 239 lb 2 oz (108.5 kg)   SpO2 95%   BMI 29.11 kg/m²      1. Have you been to the ER, urgent care clinic since your last visit? Hospitalized since your last visit? No     2. Have you seen or consulted any other health care providers outside of the [address] [place] [address] since your last visit? Include any pap smears or colon screening.  No

## 2020-11-24 LAB
ALBUMIN/CREAT UR: 1052 MG/G CREAT (ref 0–29)
BUN SERPL-MCNC: 15 MG/DL (ref 8–27)
BUN/CREAT SERPL: 17 (ref 10–24)
CALCIUM SERPL-MCNC: 9.5 MG/DL (ref 8.6–10.2)
CHLORIDE SERPL-SCNC: 98 MMOL/L (ref 96–106)
CHOLEST SERPL-MCNC: 173 MG/DL (ref 100–199)
CO2 SERPL-SCNC: 25 MMOL/L (ref 20–29)
CREAT SERPL-MCNC: 0.9 MG/DL (ref 0.76–1.27)
CREAT UR-MCNC: 84.6 MG/DL
ERYTHROCYTE [DISTWIDTH] IN BLOOD BY AUTOMATED COUNT: 12 % (ref 11.6–15.4)
EST. AVERAGE GLUCOSE BLD GHB EST-MCNC: 246 MG/DL
GLUCOSE SERPL-MCNC: 249 MG/DL (ref 65–99)
HBA1C MFR BLD: 10.2 % (ref 4.8–5.6)
HCT VFR BLD AUTO: 42.9 % (ref 37.5–51)
HDLC SERPL-MCNC: 52 MG/DL
HGB BLD-MCNC: 14.5 G/DL (ref 13–17.7)
INTERPRETATION, 910389: NORMAL
LDLC SERPL CALC-MCNC: 99 MG/DL (ref 0–99)
Lab: NORMAL
Lab: NORMAL
MCH RBC QN AUTO: 29.5 PG (ref 26.6–33)
MCHC RBC AUTO-ENTMCNC: 33.8 G/DL (ref 31.5–35.7)
MCV RBC AUTO: 87 FL (ref 79–97)
MICROALBUMIN UR-MCNC: 890.4 UG/ML
PLATELET # BLD AUTO: 224 X10E3/UL (ref 150–450)
POTASSIUM SERPL-SCNC: 4.9 MMOL/L (ref 3.5–5.2)
RBC # BLD AUTO: 4.92 X10E6/UL (ref 4.14–5.8)
SODIUM SERPL-SCNC: 138 MMOL/L (ref 134–144)
TRIGL SERPL-MCNC: 123 MG/DL (ref 0–149)
VLDLC SERPL CALC-MCNC: 22 MG/DL (ref 5–40)
WBC # BLD AUTO: 7.4 X10E3/UL (ref 3.4–10.8)

## 2020-11-27 NOTE — PROGRESS NOTES
A1c poorly controlled  DM with nephropathy and proteinuria   Remaining labs stable  Will call to adjust insulin regimen and rpt labs

## 2020-11-30 ENCOUNTER — TELEPHONE (OUTPATIENT)
Dept: FAMILY MEDICINE CLINIC | Age: 65
End: 2020-11-30

## 2020-11-30 NOTE — TELEPHONE ENCOUNTER
----- Message from Beth Body sent at 11/25/2020  9:26 AM EST -----  Regarding: Dr. Chandan Delcid    Patient return call    Caller's first and last name and relationship (if not the patient): Cristy/ Kwesi Wisdom contact number(s): [phone]      Whose call is being returned: Grisel Enrique      Details to clarify the request: Prior to  peer to peer needs most recent 2-3 office notes faxed to [phone]. Requesting to be sent by the end of business day.       Beth Body

## 2020-11-30 NOTE — TELEPHONE ENCOUNTER
----- Message from Natalie Guillen sent at 11/24/2020 11:37 AM EST -----  Regarding: Dr. Leobardo Lizama  General Message/Vendor Calls    Caller's first and last name: Luciana Parker with [employer]      Reason for call: Attn: Jose Angel Swain required yes/no and why: Yes       Best contact number(s): [phone]      Details to clarify the request: Luciana Parker,  with [employer], calling to speak with [facility]- She is needing the most recent 3 chart notes review faxed to [place]

## 2020-12-04 NOTE — TELEPHONE ENCOUNTER
Dr. David Cruz   Received: 2 days ago   Message Contents   [address]               General Message/Vendor Calls     Caller's first and last name: Self     Reason for call: Status Update       Callback required yes/no and why: yes     Best contact number(s): [phone]     Details to clarify the request: Patient would like a status update on the prior authorization that is needed for his advair that was ordered on 11/23/2020. He has still not been able to fill the medication.  Patient states prior [place] can be completed through [facility] at  [phone]. Please call the patient to discuss.      Eva Morgan

## 2020-12-09 ENCOUNTER — TELEPHONE (OUTPATIENT)
Dept: FAMILY MEDICINE CLINIC | Age: 65
End: 2020-12-09

## 2020-12-09 DIAGNOSIS — J41.0 SIMPLE CHRONIC BRONCHITIS (HCC): Primary | ICD-10-CM

## 2020-12-09 NOTE — TELEPHONE ENCOUNTER
Advair -21 MCG denied by insurance. They may consider approval of the drug after a trial of certain drugs.     A> Fluticasone/salmeterol powder  B> Dulera  C> Symbicort

## 2020-12-17 RX ORDER — BUDESONIDE AND FORMOTEROL FUMARATE DIHYDRATE 160; 4.5 UG/1; UG/1
2 AEROSOL RESPIRATORY (INHALATION) 2 TIMES DAILY
Qty: 10.2 INHALER | Refills: 3 | Status: SHIPPED | OUTPATIENT
Start: 2020-12-17 | End: 2021-06-15

## 2020-12-17 NOTE — TELEPHONE ENCOUNTER
Attempted to call pt. LVM to return call to office. DM poorly controlled with A1c at 10.2. Pt currently taking Glipizide, Metformin and now Lantus 33 units nightly. Would recommend he increase his Lantus to 36 units nightly and will recommend increasing by 2 units every 3-4 days with a goal of a fasting blood sugar < 140. If pt calls, please get his latest blood sugar readings.      Sheeba Crawford MD

## 2021-01-09 DIAGNOSIS — J44.1 COPD EXACERBATION (HCC): ICD-10-CM

## 2021-01-11 RX ORDER — ALBUTEROL SULFATE 90 UG/1
AEROSOL, METERED RESPIRATORY (INHALATION)
Qty: 9 G | Refills: 3 | Status: SHIPPED | OUTPATIENT
Start: 2021-01-11 | End: 2021-05-04

## 2021-01-22 ENCOUNTER — TELEPHONE (OUTPATIENT)
Dept: FAMILY MEDICINE CLINIC | Age: 66
End: 2021-01-22

## 2021-03-15 DIAGNOSIS — J41.1 MUCOPURULENT CHRONIC BRONCHITIS (HCC): ICD-10-CM

## 2021-03-16 RX ORDER — FLUTICASONE PROPIONATE AND SALMETEROL XINAFOATE 115; 21 UG/1; UG/1
AEROSOL, METERED RESPIRATORY (INHALATION)
Qty: 1 INHALER | Refills: 3 | Status: SHIPPED | OUTPATIENT
Start: 2021-03-16 | End: 2021-10-12 | Stop reason: SDUPTHER

## 2021-03-25 ENCOUNTER — TELEPHONE (OUTPATIENT)
Dept: FAMILY MEDICINE CLINIC | Age: 66
End: 2021-03-25

## 2021-03-25 NOTE — TELEPHONE ENCOUNTER
Per call from pt, notes he was seen at [facility] on Sunday for spot on head as it was bothering him. He states from there he was referred to [facility] in which he had CT-SCAN DONE. He states they referred him to see a surgeon in which he was scheduled on Monday to have surgery in which he canceled. He states because of him having to work in the fact that he would feel more comfortable discussing all of this prior to him making any decisions because you know what's going on with him in that he's been of pt of yours for years in trust you more. Per [facility] he has appt scheduled with you for 4/7/21 in which he called to verify also. He asked that I send you a message to be aware. He states if you need to call him and if he's not available please leave a message.     Dre Gamble (Self) [phone] (H)

## 2021-04-07 ENCOUNTER — OFFICE VISIT (OUTPATIENT)
Dept: FAMILY MEDICINE CLINIC | Age: 66
End: 2021-04-07
Payer: MEDICAID

## 2021-04-07 VITALS
OXYGEN SATURATION: 99 % | RESPIRATION RATE: 18 BRPM | HEIGHT: 76 IN | HEART RATE: 85 BPM | TEMPERATURE: 97.5 F | DIASTOLIC BLOOD PRESSURE: 77 MMHG | WEIGHT: 233 LBS | SYSTOLIC BLOOD PRESSURE: 128 MMHG | BODY MASS INDEX: 28.37 KG/M2

## 2021-04-07 DIAGNOSIS — L98.9 SKIN LESION OF HAND: ICD-10-CM

## 2021-04-07 DIAGNOSIS — L98.9 SCALP LESION: ICD-10-CM

## 2021-04-07 DIAGNOSIS — Z79.4 TYPE 2 DIABETES MELLITUS WITH DIABETIC NEPHROPATHY, WITH LONG-TERM CURRENT USE OF INSULIN (HCC): Primary | ICD-10-CM

## 2021-04-07 DIAGNOSIS — E11.21 TYPE 2 DIABETES MELLITUS WITH DIABETIC NEPHROPATHY, WITH LONG-TERM CURRENT USE OF INSULIN (HCC): Primary | ICD-10-CM

## 2021-04-07 DIAGNOSIS — Z72.0 TOBACCO ABUSE: ICD-10-CM

## 2021-04-07 DIAGNOSIS — J41.1 MUCOPURULENT CHRONIC BRONCHITIS (HCC): ICD-10-CM

## 2021-04-07 PROCEDURE — 99214 OFFICE O/P EST MOD 30 MIN: CPT | Performed by: FAMILY MEDICINE

## 2021-04-07 RX ORDER — IBUPROFEN 200 MG
3 TABLET ORAL
COMMUNITY
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
## 2021-04-07 NOTE — PROGRESS NOTES
Chief Complaint   Patient presents with    ED Follow-up     went to [facility], and a wound care specialist. the specialist wanted to schedule him for surgery and he would like a second opinion. it has been there for years, he bumped his head a few weeks ago and it began to drain. describes it as a muddy color. uses alcohol swabs, hydrogen peroxide, and neosporin, then covers it with a bandage.  Medication Refill     spiriva 2.5 mcg     Visit Vitals  /77 (BP 1 Location: Right upper arm, BP Patient Position: Sitting, BP Cuff Size: Large adult)   Pulse 85   Temp 97.5 °F (36.4 °C) (Temporal)   Resp 18   Ht 6' 4\" (1.93 m)   Wt 233 lb (105.7 kg)   SpO2 99%   BMI 28.36 kg/m²     1. Have you been to the ER, urgent care clinic since your last visit? Hospitalized since your last visit? Yes boil on his head opened and has drainage    2. Have you seen or consulted any other health care providers outside of the [address] [place] [address] since your last visit? Include any pap smears or colon screening.  Yes brian, [address] ED, and a wound care specialist

## 2021-04-08 LAB
ALBUMIN SERPL-MCNC: 4 G/DL (ref 3.8–4.8)
ALBUMIN/GLOB SERPL: 1.5 {RATIO} (ref 1.2–2.2)
ALP SERPL-CCNC: 57 IU/L (ref 39–117)
ALT SERPL-CCNC: 55 IU/L (ref 0–44)
AST SERPL-CCNC: 46 IU/L (ref 0–40)
BILIRUB SERPL-MCNC: 0.3 MG/DL (ref 0–1.2)
BUN SERPL-MCNC: 13 MG/DL (ref 8–27)
BUN/CREAT SERPL: 14 (ref 10–24)
CALCIUM SERPL-MCNC: 9.4 MG/DL (ref 8.6–10.2)
CHLORIDE SERPL-SCNC: 99 MMOL/L (ref 96–106)
CO2 SERPL-SCNC: 25 MMOL/L (ref 20–29)
CREAT SERPL-MCNC: 0.9 MG/DL (ref 0.76–1.27)
EST. AVERAGE GLUCOSE BLD GHB EST-MCNC: 226 MG/DL
GLOBULIN SER CALC-MCNC: 2.6 G/DL (ref 1.5–4.5)
GLUCOSE SERPL-MCNC: 188 MG/DL (ref 65–99)
HBA1C MFR BLD: 9.5 % (ref 4.8–5.6)
POTASSIUM SERPL-SCNC: 4.7 MMOL/L (ref 3.5–5.2)
PROT SERPL-MCNC: 6.6 G/DL (ref 6–8.5)
SODIUM SERPL-SCNC: 136 MMOL/L (ref 134–144)

## 2021-04-16 ENCOUNTER — TELEPHONE (OUTPATIENT)
Dept: FAMILY MEDICINE CLINIC | Age: 66
End: 2021-04-16

## 2021-04-16 NOTE — TELEPHONE ENCOUNTER
Attempted to call patient. LVM. Reviewed labs. A1c minimally improved to 9.5%. At last visit he was not checking his sugars or increasing insulin as we agreed. Will have him resume checking his BGs. He is aware of how to increase his insulin with the goal of getting his fasting sugars consistently < 150.      Clydia Fleischer, MD

## 2021-04-20 NOTE — TELEPHONE ENCOUNTER
LVM asking patient to call the office back if he has any questions in regards to Dr. Moriah Betancourt' voice mail about his lab results, insulin regimen, and checking his blood sugar.

## 2021-05-03 DIAGNOSIS — J44.1 COPD EXACERBATION (HCC): ICD-10-CM

## 2021-05-04 RX ORDER — ALBUTEROL SULFATE 90 UG/1
AEROSOL, METERED RESPIRATORY (INHALATION)
Qty: 9 G | Refills: 0 | Status: SHIPPED | OUTPATIENT
Start: 2021-05-04 | End: 2021-08-12

## 2021-05-20 ENCOUNTER — TELEPHONE (OUTPATIENT)
Dept: FAMILY MEDICINE CLINIC | Age: 66
End: 2021-05-20

## 2021-05-20 NOTE — TELEPHONE ENCOUNTER
----- Message from Brian Lozano MD sent at 5/19/2021  5:04 PM EDT -----  Regarding: June Schedule  Please schedule for follow up with me in office in June. Thanks! For Diabetes follow up. Can also be MWV if due.

## 2021-08-17 ENCOUNTER — TELEPHONE (OUTPATIENT)
Dept: FAMILY MEDICINE CLINIC | Age: 66
End: 2021-08-17

## 2021-08-18 RX ORDER — PEN NEEDLE, DIABETIC 29 G X1/2"
NEEDLE, DISPOSABLE MISCELLANEOUS
Qty: 100 PEN NEEDLE | Refills: 3 | Status: SHIPPED | OUTPATIENT
Start: 2021-08-18

## 2021-10-12 DIAGNOSIS — J41.1 MUCOPURULENT CHRONIC BRONCHITIS (HCC): ICD-10-CM

## 2021-10-18 RX ORDER — FLUTICASONE PROPIONATE AND SALMETEROL XINAFOATE 115; 21 UG/1; UG/1
2 AEROSOL, METERED RESPIRATORY (INHALATION) 2 TIMES DAILY
Qty: 4 EACH | Refills: 3 | Status: SHIPPED | OUTPATIENT
Start: 2021-10-18 | End: 2021-11-03 | Stop reason: SDUPTHER

## 2021-11-03 ENCOUNTER — TRANSCRIBE ORDER (OUTPATIENT)
Dept: SCHEDULING | Age: 66
End: 2021-11-03

## 2021-11-03 ENCOUNTER — OFFICE VISIT (OUTPATIENT)
Dept: FAMILY MEDICINE CLINIC | Age: 66
End: 2021-11-03
Payer: MEDICARE

## 2021-11-03 VITALS
SYSTOLIC BLOOD PRESSURE: 127 MMHG | DIASTOLIC BLOOD PRESSURE: 78 MMHG | BODY MASS INDEX: 28.49 KG/M2 | HEIGHT: 76 IN | RESPIRATION RATE: 18 BRPM | OXYGEN SATURATION: 99 % | TEMPERATURE: 97.5 F | HEART RATE: 79 BPM | WEIGHT: 234 LBS

## 2021-11-03 DIAGNOSIS — I48.91 ATRIAL FIBRILLATION, UNSPECIFIED TYPE (HCC): ICD-10-CM

## 2021-11-03 DIAGNOSIS — J41.1 MUCOPURULENT CHRONIC BRONCHITIS (HCC): ICD-10-CM

## 2021-11-03 DIAGNOSIS — Z72.0 TOBACCO ABUSE: ICD-10-CM

## 2021-11-03 DIAGNOSIS — M79.641 RIGHT HAND PAIN: ICD-10-CM

## 2021-11-03 DIAGNOSIS — Z87.891 PERSONAL HISTORY OF TOBACCO USE, PRESENTING HAZARDS TO HEALTH: ICD-10-CM

## 2021-11-03 DIAGNOSIS — Z79.4 TYPE 2 DIABETES MELLITUS WITH DIABETIC NEPHROPATHY, WITH LONG-TERM CURRENT USE OF INSULIN (HCC): Primary | ICD-10-CM

## 2021-11-03 DIAGNOSIS — Z72.0 TOBACCO ABUSE: Primary | ICD-10-CM

## 2021-11-03 DIAGNOSIS — E11.21 TYPE 2 DIABETES MELLITUS WITH DIABETIC NEPHROPATHY, WITH LONG-TERM CURRENT USE OF INSULIN (HCC): Primary | ICD-10-CM

## 2021-11-03 DIAGNOSIS — I10 PRIMARY HYPERTENSION: ICD-10-CM

## 2021-11-03 PROCEDURE — 99214 OFFICE O/P EST MOD 30 MIN: CPT | Performed by: FAMILY MEDICINE

## 2021-11-03 RX ORDER — PEN NEEDLE, DIABETIC 32GX 5/32"
NEEDLE, DISPOSABLE MISCELLANEOUS
COMMUNITY
Start: 2021-10-20

## 2021-11-03 RX ORDER — BUDESONIDE AND FORMOTEROL FUMARATE DIHYDRATE 160; 4.5 UG/1; UG/1
2 AEROSOL RESPIRATORY (INHALATION)
COMMUNITY
End: 2022-01-18

## 2021-11-03 RX ORDER — FLUTICASONE PROPIONATE AND SALMETEROL XINAFOATE 115; 21 UG/1; UG/1
2 AEROSOL, METERED RESPIRATORY (INHALATION) 2 TIMES DAILY
Qty: 4 EACH | Refills: 3 | Status: SHIPPED | OUTPATIENT
Start: 2021-11-03 | End: 2022-02-01

## 2021-11-03 NOTE — PATIENT INSTRUCTIONS
[facility] [address], [address], [place], [address] [place] [address] 
[phone] Medicare Wellness Visit, Male The best way to live healthy is to have a lifestyle where you eat a well-balanced diet, exercise regularly, limit alcohol use, and quit all forms of tobacco/nicotine, if applicable. Regular preventive services are another way to keep healthy. Preventive services (vaccines, screening tests, monitoring & exams) can help personalize your care plan, which helps you manage your own care. Screening tests can find health problems at the earliest stages, when they are easiest to treat. [facility] follows the current, evidence-based guidelines published by the [facility] Jemal Kosta ([facility]) when recommending preventive services for our patients. Because we follow these guidelines, sometimes recommendations change over time as research supports it. (For example, a prostate screening blood test is no longer routinely recommended for men with no symptoms). Of course, you and your doctor may decide to screen more often for some diseases, based on your risk and co-morbidities (chronic disease you are already diagnosed with). Preventive services for you include: - Medicare offers their members a free annual wellness visit, which is time for you and your primary care provider to discuss and plan for your preventive service needs. Take advantage of this benefit every year! 
-All adults over age 72 should receive the recommended pneumonia vaccines. Current USPSTF guidelines recommend a series of two vaccines for the best pneumonia protection.  
-All adults should have a flu vaccine yearly and tetanus vaccine every 10 years. 
-All adults age 48 and older should receive the shingles vaccines (series of two vaccines).       
-All adults age 38-68 who are overweight should have a diabetes screening test once every three years.  
-Other screening tests & preventive services for persons with diabetes include: an eye exam to screen for diabetic retinopathy, a kidney function test, a foot exam, and stricter control over your cholesterol.  
-Cardiovascular screening for adults with routine risk involves an electrocardiogram (ECG) at intervals determined by the provider.  
-Colorectal cancer screening should be done for adults age 54-65 with no increased risk factors for colorectal cancer. There are a number of acceptable methods of screening for this type of cancer. Each test has its own benefits and drawbacks. Discuss with your provider what is most appropriate for you during your annual wellness visit. The different tests include: colonoscopy (considered the best screening method), a fecal occult blood test, a fecal DNA test, and sigmoidoscopy. 
-All adults born between [facility] should be screened once for Hepatitis C. 
-An Abdominal Aortic Aneurysm (AAA) Screening is recommended for men age 73-68 who has ever smoked in their lifetime. Here is a list of your current Health Maintenance items (your personalized list of preventive services) with a due date: 
Health Maintenance Due Topic Date Due  Eye Exam  Never done  Hemoglobin A1C    07/07/2021  AAA Screening  Never done  Smoker or Former Smoker - [address]  10/12/2021  Albumin Urine Test  11/23/2021

## 2021-11-03 NOTE — PROGRESS NOTES
Anson Riley is a 73 y.o. male    Chief Complaint   Patient presents with    Diabetes     diabetes follow up. sometimes checks blood sugar at home       1. Have you been to the ER, urgent care clinic since your last visit? Hospitalized since your last visit? No    2. Have you seen or consulted any other health care providers outside of the [address] [place] [address] since your last visit? Include any pap smears or colon screening. No      Visit Vitals  /78 (BP 1 Location: Right upper arm, BP Patient Position: Sitting, BP Cuff Size: Large adult)   Pulse 79   Temp 97.5 °F (36.4 °C) (Temporal)   Resp 18   Ht 6' 4\" (1.93 m)   Wt 234 lb (106.1 kg)   SpO2 99%   BMI 28.48 kg/m²           Health Maintenance Due   Topic Date Due    Eye Exam Retinal or Dilated  Never done    A1C test (Diabetic or Prediabetic)  07/07/2021    AAA Screening 73-67 YO Male Smoking Patients  Never done    Low dose CT lung screening  10/12/2021    MICROALBUMIN Q1  11/23/2021         Medication Reconciliation completed, changes noted.   Please  Update medication list.
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
## 2021-11-04 ENCOUNTER — HOSPITAL ENCOUNTER (OUTPATIENT)
Dept: ULTRASOUND IMAGING | Age: 66
Discharge: HOME | End: 2021-11-04
Attending: FAMILY MEDICINE
Payer: MEDICARE

## 2021-11-04 ENCOUNTER — HOSPITAL ENCOUNTER (OUTPATIENT)
Dept: GENERAL RADIOLOGY | Age: 66
Discharge: HOME | End: 2021-11-04
Attending: FAMILY MEDICINE
Payer: MEDICARE

## 2021-11-04 ENCOUNTER — HOSPITAL ENCOUNTER (OUTPATIENT)
Dept: CT IMAGING | Age: 66
Discharge: HOME | End: 2021-11-04
Attending: FAMILY MEDICINE
Payer: MEDICARE

## 2021-11-04 VITALS — HEIGHT: 76 IN | WEIGHT: 235 LBS | BODY MASS INDEX: 28.62 KG/M2

## 2021-11-04 DIAGNOSIS — Z72.0 TOBACCO ABUSE: ICD-10-CM

## 2021-11-04 DIAGNOSIS — Z87.891 PERSONAL HISTORY OF TOBACCO USE, PRESENTING HAZARDS TO HEALTH: ICD-10-CM

## 2021-11-04 DIAGNOSIS — M79.641 RIGHT HAND PAIN: ICD-10-CM

## 2021-11-04 LAB
ALBUMIN SERPL-MCNC: 4 G/DL (ref 3.8–4.8)
ALBUMIN/CREAT UR: 1704 MG/G CREAT (ref 0–29)
ALBUMIN/GLOB SERPL: 1.5 {RATIO} (ref 1.2–2.2)
ALP SERPL-CCNC: 53 IU/L (ref 44–121)
ALT SERPL-CCNC: 34 IU/L (ref 0–44)
AST SERPL-CCNC: 29 IU/L (ref 0–40)
BILIRUB SERPL-MCNC: 0.3 MG/DL (ref 0–1.2)
BUN SERPL-MCNC: 17 MG/DL (ref 8–27)
BUN/CREAT SERPL: 18 (ref 10–24)
CALCIUM SERPL-MCNC: 9.3 MG/DL (ref 8.6–10.2)
CHLORIDE SERPL-SCNC: 99 MMOL/L (ref 96–106)
CO2 SERPL-SCNC: 25 MMOL/L (ref 20–29)
CREAT SERPL-MCNC: 0.97 MG/DL (ref 0.76–1.27)
CREAT UR-MCNC: 45.7 MG/DL
ERYTHROCYTE [DISTWIDTH] IN BLOOD BY AUTOMATED COUNT: 12.5 % (ref 11.6–15.4)
EST. AVERAGE GLUCOSE BLD GHB EST-MCNC: 203 MG/DL
GLOBULIN SER CALC-MCNC: 2.6 G/DL (ref 1.5–4.5)
GLUCOSE SERPL-MCNC: 166 MG/DL (ref 65–99)
HBA1C MFR BLD: 8.7 % (ref 4.8–5.6)
HCT VFR BLD AUTO: 42.5 % (ref 37.5–51)
HGB BLD-MCNC: 15.1 G/DL (ref 13–17.7)
MCH RBC QN AUTO: 29.8 PG (ref 26.6–33)
MCHC RBC AUTO-ENTMCNC: 35.5 G/DL (ref 31.5–35.7)
MCV RBC AUTO: 84 FL (ref 79–97)
MICROALBUMIN UR-MCNC: 778.8 UG/ML
PLATELET # BLD AUTO: 236 X10E3/UL (ref 150–450)
POTASSIUM SERPL-SCNC: 4.4 MMOL/L (ref 3.5–5.2)
PROT SERPL-MCNC: 6.6 G/DL (ref 6–8.5)
RBC # BLD AUTO: 5.07 X10E6/UL (ref 4.14–5.8)
SODIUM SERPL-SCNC: 137 MMOL/L (ref 134–144)
WBC # BLD AUTO: 8 X10E3/UL (ref 3.4–10.8)

## 2021-11-04 PROCEDURE — 73130 X-RAY EXAM OF HAND: CPT

## 2021-11-04 PROCEDURE — 76706 US ABDL AORTA SCREEN AAA: CPT

## 2021-11-04 PROCEDURE — 71271 CT THORAX LUNG CANCER SCR C-: CPT

## 2021-11-05 ENCOUNTER — TELEPHONE (OUTPATIENT)
Dept: FAMILY MEDICINE CLINIC | Age: 66
End: 2021-11-05

## 2021-11-05 NOTE — TELEPHONE ENCOUNTER
Called patient. ID confirmed x 2. Discussed labs. A1c elevated but improved. Continue with plan to adjust DM medications. CBC, CMP good with resolution of elevated LFTs. Urine is positive for severe range proteinuria, fortunately Cr/ GFR is stable. We discussed Nephrology eval in the past and I reiterated that we should consider this again now. Discussed LDCT, AAA screening and hand xray. For hand xray, shows OA as we suspected. Can consider PT/OT for hand function +/- hand surgeon. Follow up in 3 months.      CLINTON

## 2021-12-10 RX ORDER — LISINOPRIL AND HYDROCHLOROTHIAZIDE 12.5; 2 MG/1; MG/1
TABLET ORAL
Qty: 90 TABLET | Refills: 0 | Status: SHIPPED | OUTPATIENT
Start: 2021-12-10 | End: 2022-03-28

## 2022-02-01 PROBLEM — Z53.20 REFUSAL OF STATIN MEDICATION BY PATIENT: Status: ACTIVE | Noted: 2022-02-01

## 2022-03-09 ENCOUNTER — TELEPHONE (OUTPATIENT)
Dept: FAMILY MEDICINE CLINIC | Age: 67
End: 2022-03-09

## 2022-03-09 NOTE — TELEPHONE ENCOUNTER
----- Message from Harris Farzana sent at 3/8/2022  9:43 AM EST -----  Subject: Referral Request    QUESTIONS   Reason for referral request? DERMATOLOGIST   Has the physician seen you for this condition before? Yes  Select a date? 2021-11-03  Select the Provider the patient wants to be referred to, if known (PCP or   Specialist)? Outside Physician - [address] [place] [address]   Preferred Specialist (if applicable)? Do you already have an appointment scheduled? No  Additional Information for Provider? patient needs referral resent due to   insurance error   ---------------------------------------------------------------------------  --------------  CALL BACK INFO  What is the best way for the office to contact you? OK to leave message on   voicemail  Preferred Call Back Phone Number?  [phone]

## 2022-03-18 PROBLEM — E11.40 TYPE 2 DIABETES MELLITUS WITH DIABETIC NEUROPATHY (HCC): Status: ACTIVE | Noted: 2019-12-13

## 2022-03-18 PROBLEM — Z28.21 REFUSED INFLUENZA VACCINE: Status: ACTIVE | Noted: 2018-10-02

## 2022-03-18 PROBLEM — I48.0 PAROXYSMAL ATRIAL FIBRILLATION (HCC): Status: ACTIVE | Noted: 2019-09-12

## 2022-03-18 PROBLEM — Z53.20 REFUSAL OF STATIN MEDICATION BY PATIENT: Status: ACTIVE | Noted: 2022-02-01

## 2022-03-19 PROBLEM — E11.21 TYPE 2 DIABETES WITH NEPHROPATHY (HCC): Status: ACTIVE | Noted: 2019-08-14

## 2022-03-19 PROBLEM — Z79.4 TYPE 2 DIABETES MELLITUS, WITH LONG-TERM CURRENT USE OF INSULIN (HCC): Status: ACTIVE | Noted: 2018-04-18

## 2022-03-19 PROBLEM — M54.16 LUMBAR RADICULOPATHY: Status: ACTIVE | Noted: 2020-11-23

## 2022-03-19 PROBLEM — E11.9 TYPE 2 DIABETES MELLITUS, WITH LONG-TERM CURRENT USE OF INSULIN (HCC): Status: ACTIVE | Noted: 2018-04-18

## 2022-03-19 PROBLEM — H52.209 ASTIGMATISM: Status: ACTIVE | Noted: 2017-05-01

## 2022-03-20 PROBLEM — L98.9 SCALP LESION: Status: ACTIVE | Noted: 2021-04-07

## 2022-04-01 ENCOUNTER — OFFICE VISIT (OUTPATIENT)
Dept: FAMILY MEDICINE CLINIC | Age: 67
End: 2022-04-01
Payer: MEDICARE

## 2022-04-01 VITALS
OXYGEN SATURATION: 97 % | SYSTOLIC BLOOD PRESSURE: 132 MMHG | DIASTOLIC BLOOD PRESSURE: 68 MMHG | WEIGHT: 226.2 LBS | RESPIRATION RATE: 17 BRPM | HEIGHT: 76 IN | BODY MASS INDEX: 27.54 KG/M2 | HEART RATE: 96 BPM

## 2022-04-01 DIAGNOSIS — Z00.00 MEDICARE ANNUAL WELLNESS VISIT, SUBSEQUENT: ICD-10-CM

## 2022-04-01 DIAGNOSIS — Z79.4 TYPE 2 DIABETES MELLITUS WITH DIABETIC NEPHROPATHY, WITH LONG-TERM CURRENT USE OF INSULIN (HCC): Primary | ICD-10-CM

## 2022-04-01 DIAGNOSIS — Z12.83 SKIN CANCER SCREENING: ICD-10-CM

## 2022-04-01 DIAGNOSIS — E11.69 ONYCHOMYCOSIS OF MULTIPLE TOENAILS WITH TYPE 2 DIABETES MELLITUS (HCC): ICD-10-CM

## 2022-04-01 DIAGNOSIS — I48.0 PAROXYSMAL ATRIAL FIBRILLATION (HCC): ICD-10-CM

## 2022-04-01 DIAGNOSIS — E11.21 TYPE 2 DIABETES MELLITUS WITH DIABETIC NEPHROPATHY, WITH LONG-TERM CURRENT USE OF INSULIN (HCC): Primary | ICD-10-CM

## 2022-04-01 DIAGNOSIS — R55 SYNCOPE AND COLLAPSE: ICD-10-CM

## 2022-04-01 DIAGNOSIS — I10 ESSENTIAL HYPERTENSION: ICD-10-CM

## 2022-04-01 DIAGNOSIS — B35.1 ONYCHOMYCOSIS OF MULTIPLE TOENAILS WITH TYPE 2 DIABETES MELLITUS (HCC): ICD-10-CM

## 2022-04-01 DIAGNOSIS — Z91.81 AT HIGH RISK FOR FALLS: ICD-10-CM

## 2022-04-01 DIAGNOSIS — J41.1 MUCOPURULENT CHRONIC BRONCHITIS (HCC): ICD-10-CM

## 2022-04-01 PROBLEM — G89.4 CHRONIC PAIN DISORDER: Status: ACTIVE | Noted: 2021-12-30

## 2022-04-01 PROCEDURE — 99214 OFFICE O/P EST MOD 30 MIN: CPT | Performed by: FAMILY MEDICINE

## 2022-04-01 PROCEDURE — 2022F DILAT RTA XM EVC RTNOPTHY: CPT | Performed by: FAMILY MEDICINE

## 2022-04-01 PROCEDURE — G8419 CALC BMI OUT NRM PARAM NOF/U: HCPCS | Performed by: FAMILY MEDICINE

## 2022-04-01 PROCEDURE — G0439 PPPS, SUBSEQ VISIT: HCPCS | Performed by: FAMILY MEDICINE

## 2022-04-01 PROCEDURE — G8536 NO DOC ELDER MAL SCRN: HCPCS | Performed by: FAMILY MEDICINE

## 2022-04-01 PROCEDURE — 3017F COLORECTAL CA SCREEN DOC REV: CPT | Performed by: FAMILY MEDICINE

## 2022-04-01 PROCEDURE — G8427 DOCREV CUR MEDS BY ELIG CLIN: HCPCS | Performed by: FAMILY MEDICINE

## 2022-04-01 PROCEDURE — G8432 DEP SCR NOT DOC, RNG: HCPCS | Performed by: FAMILY MEDICINE

## 2022-04-01 PROCEDURE — G8752 SYS BP LESS 140: HCPCS | Performed by: FAMILY MEDICINE

## 2022-04-01 PROCEDURE — 1101F PT FALLS ASSESS-DOCD LE1/YR: CPT | Performed by: FAMILY MEDICINE

## 2022-04-01 PROCEDURE — G8754 DIAS BP LESS 90: HCPCS | Performed by: FAMILY MEDICINE

## 2022-04-01 PROCEDURE — 3046F HEMOGLOBIN A1C LEVEL >9.0%: CPT | Performed by: FAMILY MEDICINE

## 2022-04-01 RX ORDER — FLUTICASONE PROPIONATE AND SALMETEROL XINAFOATE 115; 21 UG/1; UG/1
2 AEROSOL, METERED RESPIRATORY (INHALATION) 2 TIMES DAILY
COMMUNITY
Start: 2022-03-25
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
## 2022-04-01 NOTE — Clinical Note
Rahul Sharif,    Need your help with a couple of referrals. 1. Physical therapy. I referred him to [facility]. I have asked him to notify us which location and the appointment date when made for us to send what they need. 2. I referred th patient to Dermatology last year. He went to the appointment but was unable to be seen because they told him they did not have the referral. Are you able to send/ process the Dermatology referral? When done, please let me or Shanti Howard know so that we can give him the green light to make the appointment. Thanks so much! I hope I am not getting on your nerves too much with my messages. Thanks!     RAMAKRISHNAT

## 2022-04-01 NOTE — PROGRESS NOTES
Subjective:   Leonidas Bautista is a 73 y.o. male presenting for diabetes management and Medicare wellness. T2DM  Last HbgA1C 8.7% in November 2021.   - Monitors BG at home inconsistently, has not done it recently. - Denies polyuria, polydipsia. - Reports decreased sensation and increased \"sharp\" pain in b/l toes and dorsum of feet. - Diet varies. Usually cereal in the mornings, seafood with dinner (shrimp, salmon). Eats bananas. No consistent vegetable intake. - No regular exercise. Fall Risk Management  History of two falls, most recently occurring several months ago. At time of most recent fall, felt his \"face blacking out\" before falling to the floor without LOC. Lives in [facility], was able to grab onto something to slow his fall to the floor. Concerned about falling again.  - Has cane for ambulation, uses infrequently. - Asked about possibly getting brain MRI, recommended to him by another healthcare provider. Skin  Was given derm referral at prior visit, but was unable to be seen in the office (issue with referral being received and processed). Concerned about lesion on his head, L hand. ROS:  Feeling well. No dyspnea or chest pain on exertion. No abdominal pain, change in bowel habits, black or bloody stools. No urinary tract or prostatic symptoms. No neurological complaints.       Current Outpatient Medications:     Advair -21 mcg/actuation inhaler, Take 2 Puffs by inhalation two (2) times a day., Disp: , Rfl:     lisinopril-hydroCHLOROthiazide (PRINZIDE, ZESTORETIC) 20-12.5 mg per tablet, Take 1 tablet by mouth once daily, Disp: 90 Tablet, Rfl: 3    ProAir HFA 90 mcg/actuation inhaler, INHALE 2 PUFFS BY MOUTH EVERY 4 HOURS AS NEEDED FOR WHEEZING, Disp: 9 g, Rfl: 3    Symbicort 160-4.5 mcg/actuation HFAA, Inhale 2 puffs by mouth twice daily, Disp: 11 g, Rfl: 3    metFORMIN (GLUCOPHAGE) 1,000 mg tablet, TAKE 1 TABLET BY MOUTH TWICE DAILY WITH MEALS, Disp: 180 Tablet, Rfl: 1    glipiZIDE (GLUCOTROL) 10 mg tablet, Take 1 tablet by mouth twice daily, Disp: 180 Tablet, Rfl: 1    glucose blood VI test strips (True Metrix Glucose Test Strip) strip, USE 1 STRIP TO CHECK GLUCOSE ONCE DAILY, Disp: 100 Strip, Rfl: 3    pen needle, diabetic 31 gauge x 15/64\" ndle, , Disp: , Rfl:     empagliflozin (JARDIANCE) 10 mg tablet, Take 1 Tablet by mouth daily. , Disp: 90 Tablet, Rfl: 1    Insulin Needles, Disposable, 31 gauge x 1/4\" ndle, Take insulin daily as prescribed, Disp: 100 Pen Needle, Rfl: 3    Lantus Solostar U-100 Insulin 100 unit/mL (3 mL) inpn, INJECT 33 UNITS SUBCUTANEOUSLY NIGHTLY (Patient taking differently: 36 Units by SubCUTAneous route nightly.), Disp: 30 mL, Rfl: 3    tiotropium bromide (Spiriva Respimat) 2.5 mcg/actuation inhaler, Take 2 Puffs by inhalation daily. , Disp: 3 Inhaler, Rfl: 3    ibuprofen (MOTRIN) 200 mg tablet, 3 Tablets every eight (8) hours as needed. , Disp: , Rfl:     Nebulizer & Compressor machine, 1 Each by Does Not Apply route daily as needed. , Disp: 1 Each, Rfl: 0    TRUETEST TEST STRIPS strip, CHECK BLOOD GLUCOSE FASTING ONCE DAILY, Disp: 50 Strip, Rfl: 21    TRUETEST TEST STRIPS strip, USE AS DIRECTED TO TEST BLOOD SUGAR ONCE DAILY, Disp: 50 Strip, Rfl: 7    TRUE METRIX GLUCOSE METER misc, USE TO CHECK GLUCOSE ONCE DAILY, Disp: 1 Each, Rfl: 0    oxyCODONE-acetaminophen (PERCOCET) 5-325 mg per tablet, Take 2 Tablets by mouth every eight (8) hours as needed. , Disp: , Rfl:     Lancets misc, Check blood glucose daily, Disp: 1 Each, Rfl: 11     No Known Allergies    Objective:     Visit Vitals  /68 (BP 1 Location: Right arm, BP Patient Position: Sitting)   Pulse 96   Resp 17   Ht 6' 4\" (1.93 m)   Wt 226 lb 3.2 oz (102.6 kg)   SpO2 97%   BMI 27.53 kg/m²     General: The patient appears well, alert, oriented x 3, in no acute distress  HEENT: Mucus membranes moist.  Neck: Supple.    Lungs: Lung are clear to auscultation in all anterior and posterior fields, good air entry, no wheezes, rhonchi or rales. Normal work of breathing. Cardiac: Normal rate, regular rhythm. No murmurs, gallops, rubs. S1 and S2 present with normal intensity. Abdominal:  Abdomen soft without tenderness, guarding, mass or organomegaly. Extremities: No peripheral edema. Peripheral pulses intact. Monofilament testing without deficits bilaterally. MSK:  Full range of motion is noted to all joints. No myalgias or arthralgias. Skin: Verrucous lesion on PIP of L 5th digit. Diffuse dry, erythematous lesions on b/l upper extremities. B/l toenails with onychomycosis. Flaky, dry skin on b.l feet. Neuro: No focal deficits. Psych: Appropriate mood and affect. Assessment/Plan:   Lele Childress is a 73 y.o. male presenting for diabetes management. Plan  - Order HgbA1C, BMP, CBC, urine microalbumin/Cr, lipid panel  - Discussed managing risk for falls including usage of assistive devices, clearing walkways. - Will order carotid artery US, brain MRI for further evaluation of fall risk management. - Refer to physical therapy for evaluation of gait and management of fall risk.   - Repeat referral to dermatology for evaluation of various skin lesions.   - Recommended following up with cardiology for atrial fibrillation (anti-coagulation indicated). Medical Student Attestation: Acting in my role as the medical student assigned to the care of this patient, I have completed my evaluation in the presence of a licensed physician who will review all of my documentation and appropriately edit prior to signing. *ATTENTION:  This note has been created by a medical student for educational purposes only. Please do not refer to the content of this note for clinical decision-making, billing, or other purposes. Please see attending physicians note to obtain clinical information on this patient. *

## 2022-04-01 NOTE — PATIENT INSTRUCTIONS
Dr. Radha Peace MD  Address: [address], [place], [address] [facility]  Phone: [phone]     Preventing Falls: Care Instructions  Your Care Instructions    Getting around your home safely can be a challenge if you have injuries or health problems that make it easy for you to fall. Loose rugs and furniture in walkways are among the dangers for many older people who have problems walking or who have poor eyesight. People who have conditions such as arthritis, osteoporosis, or dementia also have to be careful not to fall. You can make your home safer with a few simple measures. Follow-up care is a key part of your treatment and safety. Be sure to make and go to all appointments, and call your doctor if you are having problems. It's also a good idea to know your test results and keep a list of the medicines you take. How can you care for yourself at home? Taking care of yourself  · You may get dizzy if you do not drink enough water. To prevent dehydration, drink plenty of fluids, enough so that your urine is light yellow or clear like water. Choose water and other caffeine-free clear liquids. If you have kidney, heart, or liver disease and have to limit fluids, talk with your doctor before you increase the amount of fluids you drink. · Exercise regularly to improve your strength, muscle tone, and balance. Walk if you can. Swimming may be a good choice if you cannot walk easily. · Have your vision and hearing checked each year or any time you notice a change. If you have trouble seeing and hearing, you might not be able to avoid objects and could lose your balance. · Know the side effects of the medicines you take. Ask your doctor or pharmacist whether the medicines you take can affect your balance. Sleeping pills or sedatives can affect your balance. · Limit the amount of alcohol you drink. Alcohol can impair your balance and other senses.   · Ask your doctor whether calluses or corns on your feet need to be removed. If you wear loose-fitting shoes because of calluses or corns, you can lose your balance and fall. · Talk to your doctor if you have numbness in your feet. Preventing falls at home  · Remove raised doorway thresholds, throw rugs, and clutter. Repair loose carpet or raised areas in the floor. · Move furniture and electrical cords to keep them out of walking paths. · Use nonskid floor wax, and wipe up spills right away, especially on ceramic tile floors. · If you use a walker or cane, put rubber tips on it. If you use crutches, clean the bottoms of them regularly with an abrasive pad, such as steel wool. · Keep your house well lit, especially [place], and outside walkways. Use night-lights in areas such as hallways and bathrooms. Add extra light switches or use remote switches (such as switches that go on or off when you clap your hands) to make it easier to turn lights on if you have to get up during the night. · Install sturdy handrails on stairways. · Move items in your cabinets so that the things you use a lot are on the lower shelves (about waist level). · Keep a cordless phone and a flashlight with new batteries by your bed. If possible, put a phone in each of the main rooms of your house, or carry a cell phone in case you fall and cannot reach a phone. Or, you can wear a device around your neck or wrist. You push a button that sends a signal for help. · Wear low-heeled shoes that fit well and give your feet good support. Use footwear with nonskid soles. Check the heels and soles of your shoes for wear. Repair or replace worn heels or soles. · Do not wear socks without shoes on wood floors. · Walk on the grass when the sidewalks are slippery. If you live in an area that gets snow and ice in the winter, sprinkle salt on slippery steps and sidewalks.   Preventing falls in the bath  · Install grab bars and nonskid mats inside and outside your shower or tub and near the toilet and sinks. · Use shower chairs and bath benches. · Use a hand-held shower head that will allow you to sit while showering. · Get into a tub or shower by putting the weaker leg in first. Get out of a tub or shower with your strong side first.  · Repair loose toilet seats and consider installing a raised toilet seat to make getting on and off the toilet easier. · Keep your bathroom door unlocked while you are in the shower. Where can you learn more? Go to http://www.morgan.com/. Enter [phone] in the search box to learn more about \"Preventing Falls: Care Instructions. \"  Current as of: March 16, 2018  Content Version: 11.8  © 7122-3293 [employer]. Care instructions adapted under license by [employer] (which disclaims liability or warranty for this information). If you have questions about a medical condition or this instruction, always ask your healthcare professional. [place] [address] any warranty or liability for your use of this information.
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
## 2022-04-02 LAB
ANION GAP SERPL CALC-SCNC: 8 MMOL/L (ref 5–15)
BUN SERPL-MCNC: 29 MG/DL (ref 6–20)
BUN/CREAT SERPL: 23 (ref 12–20)
CALCIUM SERPL-MCNC: 8.8 MG/DL (ref 8.5–10.1)
CHLORIDE SERPL-SCNC: 98 MMOL/L (ref 97–108)
CHOLEST SERPL-MCNC: 167 MG/DL
CO2 SERPL-SCNC: 25 MMOL/L (ref 21–32)
CREAT SERPL-MCNC: 1.24 MG/DL (ref 0.7–1.3)
EST. AVERAGE GLUCOSE BLD GHB EST-MCNC: 229 MG/DL
GLUCOSE SERPL-MCNC: 342 MG/DL (ref 65–100)
HBA1C MFR BLD: 9.6 % (ref 4–5.6)
HDLC SERPL-MCNC: 44 MG/DL
HDLC SERPL: 3.8 {RATIO} (ref 0–5)
LDLC SERPL CALC-MCNC: 59.6 MG/DL (ref 0–100)
POTASSIUM SERPL-SCNC: 4.3 MMOL/L (ref 3.5–5.1)
SODIUM SERPL-SCNC: 131 MMOL/L (ref 136–145)
TRIGL SERPL-MCNC: 317 MG/DL (ref ?–150)
VLDLC SERPL CALC-MCNC: 63.4 MG/DL

## 2022-04-05 ENCOUNTER — TELEPHONE (OUTPATIENT)
Dept: FAMILY MEDICINE CLINIC | Age: 67
End: 2022-04-05

## 2022-04-05 DIAGNOSIS — Z79.4 TYPE 2 DIABETES MELLITUS WITH DIABETIC NEPHROPATHY, WITH LONG-TERM CURRENT USE OF INSULIN (HCC): ICD-10-CM

## 2022-04-05 DIAGNOSIS — E11.21 TYPE 2 DIABETES MELLITUS WITH DIABETIC NEPHROPATHY, WITH LONG-TERM CURRENT USE OF INSULIN (HCC): ICD-10-CM

## 2022-04-05 RX ORDER — INSULIN GLARGINE 100 [IU]/ML
40 INJECTION, SOLUTION SUBCUTANEOUS
Qty: 30 ML | Refills: 3 | Status: SHIPPED | OUTPATIENT
Start: 2022-04-05

## 2022-04-05 NOTE — TELEPHONE ENCOUNTER
No answer; LVM for pt to call office back. Labs notable for elevated A1c and significantly elevated blood sugar. Recommendations should he call back:  - Increase Jardiance to 25 mg daily; new script sent to pharmacy  - Increase Lantus to 40 units nightly  - Consider Freestyle Earnest meter. If pt interested, I can place order.      Detra Mcburney, MD

## 2022-04-06 ENCOUNTER — TELEPHONE (OUTPATIENT)
Dept: FAMILY MEDICINE CLINIC | Age: 67
End: 2022-04-06

## 2022-04-06 DIAGNOSIS — M54.16 LUMBAR RADICULOPATHY: ICD-10-CM

## 2022-04-06 DIAGNOSIS — R55 SYNCOPE AND COLLAPSE: ICD-10-CM

## 2022-04-06 DIAGNOSIS — Z91.81 AT HIGH RISK FOR FALLS: Primary | ICD-10-CM

## 2022-04-06 NOTE — TELEPHONE ENCOUNTER
----- Message from April Duke sent at 4/4/2022  2:18 PM EDT -----  Subject: Referral Request    QUESTIONS   Reason for referral request? Patient was referred for physical therapy by   Dr. Naveen Bob, and the place he was referred to does not take his   insurance. patient requesting a new referral somewhere else. Has the physician seen you for this condition before? Yes  Select a date? 2022-04-01  Select the Provider the patient wants to be referred to, if known (PCP or   Specialist)? Outside Physician - any   Preferred Specialist (if applicable)? Do you already have an appointment scheduled? No  Additional Information for Provider? Please call patient back when new   referral placed, thank you   ---------------------------------------------------------------------------  --------------  CALL BACK INFO  What is the best way for the office to contact you? OK to leave message on   voicemail  Preferred Call Back Phone Number? [phone]  ---------------------------------------------------------------------------  --------------  SCRIPT ANSWERS  Relationship to Patient?  Self

## 2022-04-12 ENCOUNTER — HOSPITAL ENCOUNTER (OUTPATIENT)
Dept: MRI IMAGING | Age: 67
Discharge: HOME | End: 2022-04-12
Attending: FAMILY MEDICINE
Payer: MEDICARE

## 2022-04-12 ENCOUNTER — HOSPITAL ENCOUNTER (OUTPATIENT)
Dept: VASCULAR SURGERY | Age: 67
Discharge: HOME | End: 2022-04-12
Attending: FAMILY MEDICINE
Payer: MEDICARE

## 2022-04-12 VITALS — BODY MASS INDEX: 27.51 KG/M2 | WEIGHT: 226 LBS

## 2022-04-12 DIAGNOSIS — R55 SYNCOPE AND COLLAPSE: ICD-10-CM

## 2022-04-12 PROCEDURE — 70553 MRI BRAIN STEM W/O & W/DYE: CPT

## 2022-04-12 PROCEDURE — 74011250636 HC RX REV CODE- 250/636: Performed by: RADIOLOGY

## 2022-04-12 PROCEDURE — 93880 EXTRACRANIAL BILAT STUDY: CPT

## 2022-04-12 PROCEDURE — A9575 INJ GADOTERATE MEGLUMI 0.1ML: HCPCS | Performed by: RADIOLOGY

## 2022-04-12 RX ORDER — GADOTERATE MEGLUMINE 376.9 MG/ML
20 INJECTION INTRAVENOUS
Status: COMPLETED | OUTPATIENT
Start: 2022-04-12 | End: 2022-04-12

## 2022-04-12 RX ADMIN — GADOTERATE MEGLUMINE 20 ML: 376.9 INJECTION, SOLUTION INTRAVENOUS at 13:42

## 2022-04-12 NOTE — TELEPHONE ENCOUNTER
Initial PT referral out of network.  Placed new referral to following:  [facility]   Physical Medicine & Rehabilitation   [address], [address] [place] [address]   Ph: [phone]

## 2022-04-13 LAB
LEFT CCA DIST DIAS: 11.8 CM/S
LEFT CCA DIST SYS: 56.7 CM/S
LEFT CCA PROX DIAS: 13.8 CM/S
LEFT CCA PROX SYS: 99.9 CM/S
LEFT ECA DIAS: 10.6 CM/S
LEFT ECA SYS: 99 CM/S
LEFT ICA DIST DIAS: 15.4 CM/S
LEFT ICA DIST SYS: 59 CM/S
LEFT ICA MID DIAS: 15.4 CM/S
LEFT ICA MID SYS: 55.5 CM/S
LEFT ICA PROX DIAS: 11.9 CM/S
LEFT ICA PROX SYS: 46.8 CM/S
LEFT ICA/CCA SYS: 1 NO UNITS
LEFT VERTEBRAL DIAS: 0 CM/S
LEFT VERTEBRAL SYS: 25 CM/S
RIGHT CCA DIST DIAS: 10.3 CM/S
RIGHT CCA DIST SYS: 74.2 CM/S
RIGHT CCA PROX DIAS: 10.3 CM/S
RIGHT CCA PROX SYS: 105.5 CM/S
RIGHT ECA DIAS: 6.4 CM/S
RIGHT ECA SYS: 75.5 CM/S
RIGHT ICA DIST DIAS: 9.5 CM/S
RIGHT ICA DIST SYS: 39.5 CM/S
RIGHT ICA MID DIAS: 15 CM/S
RIGHT ICA MID SYS: 59.4 CM/S
RIGHT ICA PROX DIAS: 7.4 CM/S
RIGHT ICA PROX SYS: 26.7 CM/S
RIGHT ICA/CCA SYS: 0.8 NO UNITS
RIGHT VERTEBRAL DIAS: 8.5 CM/S
RIGHT VERTEBRAL SYS: 29.2 CM/S
VAS LEFT SUBCLAVIAN PROX EDV: 2.6 CM/S
VAS LEFT SUBCLAVIAN PROX PSV: 107.9 CM/S
VAS RIGHT SUBCLAVIAN PROX EDV: 0 CM/S
VAS RIGHT SUBCLAVIAN PROX PSV: 74.2 CM/S

## 2022-04-14 ENCOUNTER — TELEPHONE (OUTPATIENT)
Dept: FAMILY MEDICINE CLINIC | Age: 67
End: 2022-04-14

## 2022-04-14 NOTE — TELEPHONE ENCOUNTER
Called to review imaging and lab results. ID confirmed x 2. We discussed his carotid doppler and MRI of the brain. No acute findings that would explain the near-syncope or syncope. We discussed his labs. Increased Lantus to 40 units daily and increased Jardiance to 25mg daily. He recently bought a pill box to help him keep an account of his medications. He is also working on his diet. Pt was in agreement with plan and seems motivated to change.     Mukesh Peng MD

## 2022-07-07 ENCOUNTER — OFFICE VISIT (OUTPATIENT)
Dept: FAMILY MEDICINE CLINIC | Age: 67
End: 2022-07-07
Payer: MEDICARE

## 2022-07-07 VITALS
WEIGHT: 223 LBS | HEIGHT: 76 IN | RESPIRATION RATE: 16 BRPM | SYSTOLIC BLOOD PRESSURE: 120 MMHG | OXYGEN SATURATION: 96 % | DIASTOLIC BLOOD PRESSURE: 67 MMHG | HEART RATE: 89 BPM | BODY MASS INDEX: 27.16 KG/M2

## 2022-07-07 DIAGNOSIS — M19.049 HAND ARTHRITIS: ICD-10-CM

## 2022-07-07 DIAGNOSIS — I10 ESSENTIAL HYPERTENSION: ICD-10-CM

## 2022-07-07 DIAGNOSIS — E11.21 TYPE 2 DIABETES MELLITUS WITH DIABETIC NEPHROPATHY, WITH LONG-TERM CURRENT USE OF INSULIN (HCC): Primary | ICD-10-CM

## 2022-07-07 DIAGNOSIS — Z79.4 TYPE 2 DIABETES MELLITUS WITH DIABETIC NEPHROPATHY, WITH LONG-TERM CURRENT USE OF INSULIN (HCC): Primary | ICD-10-CM

## 2022-07-07 DIAGNOSIS — E16.2 HYPOGLYCEMIA: ICD-10-CM

## 2022-07-07 DIAGNOSIS — N18.31 STAGE 3A CHRONIC KIDNEY DISEASE (HCC): ICD-10-CM

## 2022-07-07 PROBLEM — N18.30 CHRONIC RENAL DISEASE, STAGE III (HCC): Status: ACTIVE | Noted: 2022-07-07

## 2022-07-07 LAB — HBA1C MFR BLD HPLC: 8.6 %

## 2022-07-07 PROCEDURE — 99214 OFFICE O/P EST MOD 30 MIN: CPT | Performed by: FAMILY MEDICINE

## 2022-07-07 PROCEDURE — G8417 CALC BMI ABV UP PARAM F/U: HCPCS | Performed by: FAMILY MEDICINE

## 2022-07-07 PROCEDURE — G8432 DEP SCR NOT DOC, RNG: HCPCS | Performed by: FAMILY MEDICINE

## 2022-07-07 PROCEDURE — G8752 SYS BP LESS 140: HCPCS | Performed by: FAMILY MEDICINE

## 2022-07-07 PROCEDURE — 3017F COLORECTAL CA SCREEN DOC REV: CPT | Performed by: FAMILY MEDICINE

## 2022-07-07 PROCEDURE — 1101F PT FALLS ASSESS-DOCD LE1/YR: CPT | Performed by: FAMILY MEDICINE

## 2022-07-07 PROCEDURE — G8536 NO DOC ELDER MAL SCRN: HCPCS | Performed by: FAMILY MEDICINE

## 2022-07-07 PROCEDURE — 1123F ACP DISCUSS/DSCN MKR DOCD: CPT | Performed by: FAMILY MEDICINE

## 2022-07-07 PROCEDURE — 3052F HG A1C>EQUAL 8.0%<EQUAL 9.0%: CPT | Performed by: FAMILY MEDICINE

## 2022-07-07 PROCEDURE — G8754 DIAS BP LESS 90: HCPCS | Performed by: FAMILY MEDICINE

## 2022-07-07 PROCEDURE — G8427 DOCREV CUR MEDS BY ELIG CLIN: HCPCS | Performed by: FAMILY MEDICINE

## 2022-07-07 PROCEDURE — 2022F DILAT RTA XM EVC RTNOPTHY: CPT | Performed by: FAMILY MEDICINE

## 2022-07-07 PROCEDURE — 83036 HEMOGLOBIN GLYCOSYLATED A1C: CPT | Performed by: FAMILY MEDICINE

## 2022-07-07 RX ORDER — GLIPIZIDE 10 MG/1
10 TABLET ORAL DAILY
Qty: 180 TABLET | Refills: 1
Start: 2022-07-07

## 2022-07-07 RX ORDER — PEN NEEDLE, DIABETIC 32 GX 1/4"
NEEDLE, DISPOSABLE MISCELLANEOUS
COMMUNITY
Start: 2022-06-15 | End: 2022-10-13

## 2022-07-07 RX ORDER — DICLOFENAC SODIUM 10 MG/G
2 GEL TOPICAL 4 TIMES DAILY
Qty: 100 G | Refills: 0 | Status: SHIPPED | OUTPATIENT
Start: 2022-07-07

## 2022-07-07 NOTE — PATIENT INSTRUCTIONS
[facility]   Physical Medicine & Rehabilitation   [address] [place], [place] [id number] [facility], [address] [place] [address]   Ph: [phone]

## 2022-07-07 NOTE — PROGRESS NOTES
Subjective:     Shannan Benavidez is a 73 y.o. male with a history of Afib, COPD, essential HTN, CKD (stage III) and T2DM who presents to the clinic for follow up of DM management. He reports having several low glucose readings recently where he has woken up in the middle of the night with tremors and checked his blood glucose which has been in the 40-50 range. He notes not checking his blood glucose every day, but it is typically in the 150-180 when he does check. He also reports worsening numbness, tingling, and pain in his feet. He denies vision changes, CP, SOB, or any other concerns at this time.    Diet: fruit, steak, garlic bread, shrimp, salmon    Patient Active Problem List   Diagnosis Code    COPD (chronic obstructive pulmonary disease) ([facility] J44.9    Low back pain M54.50    Tobacco abuse Z72.0    Hypertension I10    Overweight E66.3    Hepatitis C antibody test positive R76.8    Proteinuria R80.9    Astigmatism H52.209    Type 2 diabetes mellitus, with long-term current use of insulin [facility] E11.9, Z79.4    Refused influenza vaccine Z28.21    Type 2 diabetes with nephropathy (HCC) E11.21    Paroxysmal atrial fibrillation (HCC) I48.0    Type 2 diabetes mellitus with diabetic neuropathy [facility] E11.40    Lumbar radiculopathy M54.16    Scalp lesion L98.9    Refusal of statin medication by patient Z53.20    Chronic pain disorder G89.4    At high risk for falls Z91.81    Chronic renal disease, stage III N18.30        Lab Results   Component Value Date/Time    Hemoglobin A1c 9.6 (H) 04/01/2022 11:50 AM    Hemoglobin A1c 8.7 (H) 11/03/2021 12:00 AM    Hemoglobin A1c 9.5 (H) 04/07/2021 10:27 AM    Glucose 342 (H) 04/01/2022 11:50 AM    Glucose  10/02/2018 09:32 AM    Microalb/Creat ratio (ug/mg creat.) 1,704 (H) 11/03/2021 12:00 AM    LDL, calculated 59.6 04/01/2022 11:50 AM    Creatinine 1.24 04/01/2022 11:50 AM         Review of Systems    Review of Systems   Constitutional: Negative for chills and fever. HENT: Negative for hearing loss. Eyes: Negative for blurred vision and double vision. Respiratory: Negative for cough. Cardiovascular: Negative for chest pain. Gastrointestinal: Negative for abdominal pain, nausea and vomiting. Genitourinary: Negative for dysuria. Musculoskeletal: Positive for back pain and joint pain (Left hand). Negative for myalgias. Neurological: Negative for dizziness and headaches. Psychiatric/Behavioral: Negative for depression. Objective:     Visit Vitals  /67 (BP 1 Location: Right arm, BP Patient Position: Sitting)   Pulse 89   Resp 16   Ht 6' 4\" (1.93 m)   Wt 223 lb (101.2 kg)   SpO2 96%   BMI 27.14 kg/m²     Physical Exam  Constitutional:       Appearance: Normal appearance. HENT:      Head: Normocephalic and atraumatic. Eyes:      Extraocular Movements: Extraocular movements intact. Pupils: Pupils are equal, round, and reactive to light. Cardiovascular:      Rate and Rhythm: Normal rate and regular rhythm. Pulses: Normal pulses. Heart sounds: Normal heart sounds. Pulmonary:      Effort: Pulmonary effort is normal.      Breath sounds: Normal breath sounds. Abdominal:      General: Abdomen is flat. Bowel sounds are normal.      Palpations: Abdomen is soft. Musculoskeletal:      Cervical back: Normal range of motion and neck supple. Skin:     General: Skin is warm and dry. Capillary Refill: Capillary refill takes less than 2 seconds. Neurological:      General: No focal deficit present. Mental Status: He is alert and oriented to person, place, and time. Psychiatric:         Mood and Affect: Mood normal.         Behavior: Behavior normal.           Assessment/Plan:     Type II DM:  - Will check patients a1c at today's visit  - Due to episodes of hypoglycemia, the patient has been advised to discontinue glipizide dose before bed to prevent further episodes.    - Patient has been instruction to contact the clinic if he continues experiencing episodes of hypoglycemia.  - Follow up in 3 month for continued DM management. Encounter Diagnoses   Name Primary?  Type 2 diabetes mellitus with diabetic nephropathy, with long-term current use of insulin (HCC) Yes    Paroxysmal atrial fibrillation (HCC)     Essential hypertension     Stage 3a chronic kidney disease (HCC)     Hand arthritis     Hypoglycemia      *ATTENTION:  This note has been created by a medical student for educational purposes only. Please do not refer to the content of this note for clinical decision-making, billing, or other purposes. Please see attending physicians note to obtain clinical information on this patient. *
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
## 2022-07-07 NOTE — PROGRESS NOTES
Miguel Villela is a 73 y.o. male    Chief Complaint   Patient presents with    Diabetes       1. Have you been to the ER, urgent care clinic since your last visit? Hospitalized since your last visit? No  2. Have you seen or consulted any other health care providers outside of the [address] [place] [address] since your last visit? Include any pap smears or colon screening. No    Visit Vitals  /67 (BP 1 Location: Right arm, BP Patient Position: Sitting)   Pulse 89   Resp 16   Ht 6' 4\" (1.93 m)   Wt 223 lb (101.2 kg)   SpO2 96%   BMI 27.14 kg/m²       Health Maintenance Due   Topic Date Due    Eye Exam Retinal or Dilated  Never done    COVID-19 Vaccine (2 - Booster for Tai series) 06/28/2021     Patient states since his medication change, there have been two times his blood sugars have been in the 40's-50's.

## 2023-01-15 NOTE — MR AVS SNAPSHOT
Bed: 23  Expected date:   Expected time:   Means of arrival:   Comments:  EMS   Visit Information Date & Time Provider Department Dept. Phone Encounter #  
 6/9/2017  9:05 AM aDrby Matthew, [address] [phone] [id number] Follow-up Instructions Return in about 3 months (around 9/9/2017) for Diabetes. Upcoming Health Maintenance Date Due INFLUENZA AGE 9 TO ADULT 8/1/2017 FOOT EXAM Q1 9/15/2017 MICROALBUMIN Q1 9/15/2017 LIPID PANEL Q1 9/15/2017 HEMOGLOBIN A1C Q6M 10/27/2017 EYE EXAM RETINAL OR DILATED Q1 4/24/2018 DTaP/Tdap/Td series (2 - Td) 9/15/2022 COLONOSCOPY 4/25/2026 Allergies as of 6/9/2017  Review Complete On: 6/9/2017 By: Darby Matthew MD  
 No Known Allergies Current Immunizations  Reviewed on 2/27/2017 No immunizations on file. Not reviewed this visit You Were Diagnosed With   
  
 Codes Comments Uncontrolled type 2 diabetes mellitus with stage 3 chronic kidney disease, with long-term current use of insulin (HCC)    -  Primary ICD-10-CM: E11.22, E11.65, N18.3, Z79.4 ICD-9-CM: 250.52, 585.3, V58.67 Simple chronic bronchitis (HCC)     ICD-10-CM: J41.0 ICD-9-CM: 491.0 Tobacco abuse     ICD-10-CM: Z72.0 ICD-9-CM: 305.1 Essential hypertension     ICD-10-CM: I10 
ICD-9-CM: 401.9 Hepatitis C antibody test positive     ICD-10-CM: R76.8 ICD-9-CM: 795.79 Persistent proteinuria     ICD-10-CM: R80.1 ICD-9-CM: 791.0 Vitals BP Pulse Temp Resp Height(growth percentile) Weight(growth percentile) 145/89 69 97.3 °F (36.3 °C) (Oral) 16 6' 3\" (1.905 m) 232 lb (105.2 kg) SpO2 BMI Smoking Status 97% 29 kg/m2 Current Every Day Smoker Vitals History BMI and BSA Data Body Mass Index Body Surface Area  
 29 kg/m 2 2.36 m 2 Preferred Pharmacy Pharmacy Name Phone [facility] [place], [address] [phone] Your Updated Medication List  
  
   
 This list is accurate as of: 6/9/17  9:47 AM.  Always use your most recent med list.  
  
  
  
  
 atorvastatin 80 mg tablet Commonly known as:  LIPITOR Take 1 Tab by mouth daily. budesonide-formoterol 160-4.5 mcg/actuation HFA inhaler Commonly known as:  SYMBICORT Take 2 Puffs by inhalation two (2) times a day. cyclobenzaprine 10 mg tablet Commonly known as:  FLEXERIL Take 1 Tab by mouth three (3) times daily as needed for Muscle Spasm(s). gabapentin 600 mg tablet Commonly known as:  NEURONTIN Take  by mouth two (2) times a day. glipiZIDE 10 mg tablet Commonly known as:  GLUCOTROL  
TAKE ONE TABLET BY MOUTH TWICE DAILY  
  
 glucose blood VI test strips strip Commonly known as:  TRUE METRIX GLUCOSE TEST STRIP Test blood sugars 2 x daily. ibuprofen 600 mg tablet Commonly known as:  MOTRIN  
TAKE ONE TABLET BY MOUTH EVERY 8 HOURS AS NEEDED FOR PAIN  
  
 insulin glargine 100 unit/mL (3 mL) Inpn Commonly known as:  Nati Jay Take 33 units of insulin once daily Insulin Needles (Disposable) 31 gauge x 1/4\" Ndle Commonly known as:  COMFORT EZ PEN NEEDLES  
1 Units by Does Not Apply route daily. Lancets Misc Check blood glucose daily  
  
 lisinopril-hydroCHLOROthiazide 20-12.5 mg per tablet Commonly known as:  PRINZIDE, ZESTORETIC  
TAKE ONE TABLET BY MOUTH ONCE DAILY  
  
 metFORMIN 1,000 mg tablet Commonly known as:  GLUCOPHAGE  
TAKE ONE TABLET BY MOUTH TWICE DAILY WITH  MEALS  
  
 oxyCODONE-acetaminophen 5-325 mg per tablet Commonly known as:  PERCOCET SITagliptin 50 mg tablet Commonly known as:  Doc Lannyck Take 1 Tab by mouth daily. tiotropium bromide 1.25 mcg/actuation inhaler Commonly known as:  Adelita Hammers Take 2 Puffs by inhalation daily. TRUE METRIX GLUCOSE METER Misc Generic drug:  Blood-Glucose Meter USE TO CHECK GLUCOSE ONCE DAILY Prescriptions Sent to Pharmacy Refills  
 insulin glargine (LANTUS,BASAGLAR) 100 unit/mL (3 mL) inpn 3 Sig: Take 33 units of insulin once daily Class: Normal  
 Pharmacy: [facility]  #: [phone] Follow-up Instructions Return in about 3 months (around 9/9/2017) for Diabetes. Patient Instructions Insulin Glargine (By injection) Insulin Glargine, Recombinant (IN-taylor-lisa GLAR-jeen, yvonne-KOM-bi-anthnoyjuanito) Treats diabetes. Brand Name(s): Basaglar, Lantus, Lantus SoloStar, Toujeo There may be other brand names for this medicine. When This Medicine Should Not Be Used: This medicine is not right for everyone. Do not use it if you had an allergic reaction to insulin glargine. How to Use This Medicine:  
Injectable · Your healthcare provider will work with you to personalize your dose and treatment based on your insulin needs and lifestyle. You will be taught how to give yourself the injections. Make sure you understand all instructions. Ask the doctor, nurse, or pharmacist if you have questions. · If you use insulin once a day, it is best to use it at about the same time every day. · Always double-check both the concentration (strength) of your insulin and your dose. Concentration and dose are not the same. The dose is how many units of insulin you will use. The concentration tells how many units of insulin are in each milliliter (mL), such as 100 units/mL (U-100), but this does not mean you will use 100 units at a time. · Read and follow the patient instructions that come with this medicine. Talk to your doctor or pharmacist if you have any questions. · This medicine should look clear before you use it. Do not shake the vial. Do not mix this medicine with any other insulin or with water. · You will be shown the body areas where this shot can be given. Use a different body area each time you give yourself a shot.  Keep track of where you give each shot to make sure you rotate body areas. · Use a new needle and syringe each time you inject your medicine. If you use a syringe, use only the kind that is made for insulin injections. Some insulin must be given with a specific type of syringe or needle. Ask your pharmacist if you are not sure which one to use. · Always check the label before use, to make sure you have the correct type of insulin. Do not change the brand, type, or concentration unless your doctor tells you to. If you use a pump or other device, make sure the insulin is made for that device. · Unopened medicine: Store the vials, cartridges, and SoloStar® pens in the refrigerator. Protect from light. Do not freeze. · Opened medicine: ¨ Vials: Store in the refrigerator or at room temperature in a cool place, away from sunlight and heat. Use within 28 days. ¨ Cartridge or [employer]: Store at room temperature, away from direct heat and light. Do not refrigerate. Throw away any opened cartridge or Lantus® SoloStar® pen after 28 days. Throw away any opened [employer] after 42 days. · Throw away used needles in a hard, closed container that the needles cannot poke through. Keep this container away from children and pets. Drugs and Foods to Avoid: Ask your doctor or pharmacist before using any other medicine, including over-the-counter medicines, vitamins, and herbal products. · Some medicines can change the amount of insulin you need to use and make it harder for you to control your diabetes. Tell your doctor about all other medicines that you are using. · Do not drink alcohol while you are using this medicine. Warnings While Using This Medicine: · Tell your doctor if you are pregnant or breastfeeding, or if you have kidney disease, liver disease, heart disease, or heart failure. · This medicine may cause the following problems: 
¨ Low blood sugar or low potassium levels in the blood ¨ Fluid retention or heart failure (when used with thiazolidinedione [TZD] medicine) · Never share insulin pens, needles, or cartridges with anyone. Sharing these can pass hepatitis viruses, HIV, or other illnesses from one person to another. · Keep all medicine out of the reach of children. Never share your medicine with anyone. Possible Side Effects While Using This Medicine:  
Call your doctor right away if you notice any of these side effects: · Allergic reaction: Itching or hives, swelling in your face or hands, swelling or tingling in your mouth or throat, chest tightness, trouble breathing · Dry mouth, increased thirst, muscle cramps, nausea or vomiting, uneven heartbeat · Rapid weight gain, swelling in your hands, ankles, or feet, trouble breathing, tiredness · Shaking, trembling, sweating, fast or pounding heartbeat, lightheadedness, hunger, confusion If you notice these less serious side effects, talk with your doctor: · Redness, pain, itching, swelling, or any skin changes where the shot was given If you notice other side effects that you think are caused by this medicine, tell your doctor. Call your doctor for medical advice about side effects. You may report side effects to FDA at 1-542-FDA-0534 © 2017 [address] Information is for End User's use only and may not be sold, redistributed or otherwise used for commercial purposes. The above information is an  only. It is not intended as medical advice for individual conditions or treatments. Talk to your doctor, nurse or pharmacist before following any medical regimen to see if it is safe and effective for you. Introducing \[facility]\"" & HEALTH SERVICES! Ian Johnson introduces Look[employer] patient portal. Now you can access parts of your medical record, email your doctor's office, and request medication refills online. 1. In your internet browser, go to [email or web address] 2. Click on the First Time User? Click Here link in the Sign In box. You will see the New Member Sign Up page. 3. Enter your [employer] Access Code exactly as it appears below. You will not need to use this code after youve completed the sign-up process. If you do not sign up before the expiration date, you must request a new code. · Trade[employer] Access Code: [email or web address] Expires: 7/26/2017  3:58 PM 
 
4. Enter the last four digits of your Social Security Number (xxxx) and Date of Birth (mm/dd/yyyy) as indicated and click Submit. You will be taken to the next sign-up page. 5. Create a TradeCard ID. This will be your TradeCard login ID and cannot be changed, so think of one that is secure and easy to remember. 6. Create a Trade[employer] password. You can change your password at any time. 7. Enter your Password Reset Question and Answer. This can be used at a later time if you forget your password. 8. Enter your e-mail address. You will receive e-mail notification when new information is available in [address] 9. Click Sign Up. You can now view and download portions of your medical record. 10. Click the Download Summary menu link to download a portable copy of your medical information. If you have questions, please visit the Frequently Asked Questions section of the [employer] website. Remember, [employer] is NOT to be used for urgent needs. For medical emergencies, dial 911. Now available from your iPhone and Android! Please provide this summary of care documentation to your next provider. Your primary care clinician is listed as Katlyn Cole. If you have any questions after today's visit, please call [phone].

## 2023-02-01 ENCOUNTER — OFFICE VISIT (OUTPATIENT)
Dept: FAMILY MEDICINE CLINIC | Age: 68
End: 2023-02-01
Payer: MEDICARE

## 2023-02-01 VITALS
TEMPERATURE: 98.2 F | SYSTOLIC BLOOD PRESSURE: 102 MMHG | BODY MASS INDEX: 26.93 KG/M2 | DIASTOLIC BLOOD PRESSURE: 67 MMHG | HEIGHT: 76 IN | WEIGHT: 221.2 LBS | OXYGEN SATURATION: 95 % | RESPIRATION RATE: 18 BRPM | HEART RATE: 101 BPM

## 2023-02-01 DIAGNOSIS — I48.0 PAROXYSMAL ATRIAL FIBRILLATION (HCC): ICD-10-CM

## 2023-02-01 DIAGNOSIS — Z79.4 TYPE 2 DIABETES MELLITUS WITH DIABETIC NEPHROPATHY, WITH LONG-TERM CURRENT USE OF INSULIN (HCC): Primary | ICD-10-CM

## 2023-02-01 DIAGNOSIS — Z87.891 PERSONAL HISTORY OF TOBACCO USE, PRESENTING HAZARDS TO HEALTH: ICD-10-CM

## 2023-02-01 DIAGNOSIS — G57.93 NEUROPATHIC PAIN OF BOTH FEET: ICD-10-CM

## 2023-02-01 DIAGNOSIS — J41.1 MUCOPURULENT CHRONIC BRONCHITIS (HCC): ICD-10-CM

## 2023-02-01 DIAGNOSIS — N18.31 STAGE 3A CHRONIC KIDNEY DISEASE (HCC): ICD-10-CM

## 2023-02-01 DIAGNOSIS — Z72.0 TOBACCO ABUSE: ICD-10-CM

## 2023-02-01 DIAGNOSIS — R68.89 OTHER GENERAL SYMPTOMS AND SIGNS: ICD-10-CM

## 2023-02-01 DIAGNOSIS — E11.21 TYPE 2 DIABETES MELLITUS WITH DIABETIC NEPHROPATHY, WITH LONG-TERM CURRENT USE OF INSULIN (HCC): Primary | ICD-10-CM

## 2023-02-01 LAB — HBA1C MFR BLD HPLC: 9 %

## 2023-02-01 PROCEDURE — G8536 NO DOC ELDER MAL SCRN: HCPCS | Performed by: FAMILY MEDICINE

## 2023-02-01 PROCEDURE — G8417 CALC BMI ABV UP PARAM F/U: HCPCS | Performed by: FAMILY MEDICINE

## 2023-02-01 PROCEDURE — 1123F ACP DISCUSS/DSCN MKR DOCD: CPT | Performed by: FAMILY MEDICINE

## 2023-02-01 PROCEDURE — G8432 DEP SCR NOT DOC, RNG: HCPCS | Performed by: FAMILY MEDICINE

## 2023-02-01 PROCEDURE — 3017F COLORECTAL CA SCREEN DOC REV: CPT | Performed by: FAMILY MEDICINE

## 2023-02-01 PROCEDURE — 83036 HEMOGLOBIN GLYCOSYLATED A1C: CPT | Performed by: FAMILY MEDICINE

## 2023-02-01 PROCEDURE — G0296 VISIT TO DETERM LDCT ELIG: HCPCS | Performed by: FAMILY MEDICINE

## 2023-02-01 PROCEDURE — 99214 OFFICE O/P EST MOD 30 MIN: CPT | Performed by: FAMILY MEDICINE

## 2023-02-01 PROCEDURE — 3078F DIAST BP <80 MM HG: CPT | Performed by: FAMILY MEDICINE

## 2023-02-01 PROCEDURE — 2022F DILAT RTA XM EVC RTNOPTHY: CPT | Performed by: FAMILY MEDICINE

## 2023-02-01 PROCEDURE — 1101F PT FALLS ASSESS-DOCD LE1/YR: CPT | Performed by: FAMILY MEDICINE

## 2023-02-01 PROCEDURE — 3052F HG A1C>EQUAL 8.0%<EQUAL 9.0%: CPT | Performed by: FAMILY MEDICINE

## 2023-02-01 PROCEDURE — 3074F SYST BP LT 130 MM HG: CPT | Performed by: FAMILY MEDICINE

## 2023-02-01 PROCEDURE — G8427 DOCREV CUR MEDS BY ELIG CLIN: HCPCS | Performed by: FAMILY MEDICINE

## 2023-02-01 NOTE — Clinical Note
Recently moved to [facility]. Needs help finding a cardiologist in [facility] to establish with.

## 2023-02-01 NOTE — PROGRESS NOTES
Chief Complaint   Patient presents with    Diabetes    Follow-up     Visit Vitals  /67 (BP 1 Location: Right upper arm, BP Patient Position: Sitting, BP Cuff Size: Large adult)   Pulse (!) 101   Temp 98.2 °F (36.8 °C) (Oral)   Resp 18   Ht 6' 4\" (1.93 m)   Wt 221 lb 3.2 oz (100.3 kg)   SpO2 95%   BMI 26.93 kg/m²     1. \"Have you been to the ER, urgent care clinic since your last visit? Hospitalized since your last visit? \" No    2. \"Have you seen or consulted any other health care providers outside of the [address] [place] [address] since your last visit? \" No     3. For patients aged 39-70: Has the patient had a colonoscopy / FIT/ Cologuard? No      If the patient is female:    4. For patients aged 41-77: Has the patient had a mammogram within the past 2 years? NA - based on age or sex      11. For patients aged 21-65: Has the patient had a pap smear?  NA - based on age or sex

## 2023-02-01 NOTE — PATIENT INSTRUCTIONS
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
## 2023-02-02 LAB
ALBUMIN SERPL-MCNC: 4.2 G/DL (ref 3.8–4.8)
ALBUMIN/CREAT UR: 246 MG/G CREAT (ref 0–29)
ALBUMIN/GLOB SERPL: 1.4 {RATIO} (ref 1.2–2.2)
ALP SERPL-CCNC: 59 IU/L (ref 44–121)
ALT SERPL-CCNC: 34 IU/L (ref 0–44)
AST SERPL-CCNC: 28 IU/L (ref 0–40)
BILIRUB SERPL-MCNC: 0.5 MG/DL (ref 0–1.2)
BUN SERPL-MCNC: 19 MG/DL (ref 8–27)
BUN/CREAT SERPL: 18 (ref 10–24)
CALCIUM SERPL-MCNC: 10 MG/DL (ref 8.6–10.2)
CHLORIDE SERPL-SCNC: 94 MMOL/L (ref 96–106)
CHOLEST SERPL-MCNC: 165 MG/DL (ref 100–199)
CO2 SERPL-SCNC: 22 MMOL/L (ref 20–29)
CREAT SERPL-MCNC: 1.08 MG/DL (ref 0.76–1.27)
CREAT UR-MCNC: 62.2 MG/DL
EGFRCR SERPLBLD CKD-EPI 2021: 76 ML/MIN/1.73
ERYTHROCYTE [DISTWIDTH] IN BLOOD BY AUTOMATED COUNT: 11.8 % (ref 11.6–15.4)
GLOBULIN SER CALC-MCNC: 3.1 G/DL (ref 1.5–4.5)
GLUCOSE SERPL-MCNC: 249 MG/DL (ref 70–99)
HCT VFR BLD AUTO: 47 % (ref 37.5–51)
HDLC SERPL-MCNC: 50 MG/DL
HGB BLD-MCNC: 16.4 G/DL (ref 13–17.7)
IMP & REVIEW OF LAB RESULTS: NORMAL
LDLC SERPL CALC-MCNC: 96 MG/DL (ref 0–99)
MCH RBC QN AUTO: 29.4 PG (ref 26.6–33)
MCHC RBC AUTO-ENTMCNC: 34.9 G/DL (ref 31.5–35.7)
MCV RBC AUTO: 84 FL (ref 79–97)
MICROALBUMIN UR-MCNC: 153.1 UG/ML
PLATELET # BLD AUTO: 259 X10E3/UL (ref 150–450)
POTASSIUM SERPL-SCNC: 4.9 MMOL/L (ref 3.5–5.2)
PROT SERPL-MCNC: 7.3 G/DL (ref 6–8.5)
RBC # BLD AUTO: 5.58 X10E6/UL (ref 4.14–5.8)
SODIUM SERPL-SCNC: 133 MMOL/L (ref 134–144)
TRIGL SERPL-MCNC: 105 MG/DL (ref 0–149)
VLDLC SERPL CALC-MCNC: 19 MG/DL (ref 5–40)
WBC # BLD AUTO: 9.6 X10E3/UL (ref 3.4–10.8)

## 2023-02-09 ENCOUNTER — TELEPHONE (OUTPATIENT)
Dept: FAMILY MEDICINE CLINIC | Age: 68
End: 2023-02-09

## 2023-02-09 NOTE — TELEPHONE ENCOUNTER
----- Message from Landry Meckel sent at 2/8/2023  2:43 PM EST -----  Subject: Message to Provider    QUESTIONS  Information for Provider? Pt returning [address] call. Please call pt back.   ---------------------------------------------------------------------------  --------------  Karli Malloy [facility]  [phone]; OK to leave message on voicemail  ---------------------------------------------------------------------------  --------------  SCRIPT ANSWERS  undefined

## 2023-03-08 ENCOUNTER — OFFICE VISIT (OUTPATIENT)
Dept: CARDIOLOGY CLINIC | Facility: CLINIC | Age: 68
End: 2023-03-08

## 2023-03-08 ENCOUNTER — TELEPHONE (OUTPATIENT)
Dept: CARDIOLOGY CLINIC | Age: 68
End: 2023-03-08

## 2023-03-08 VITALS
RESPIRATION RATE: 22 BRPM | BODY MASS INDEX: 27.52 KG/M2 | TEMPERATURE: 97 F | SYSTOLIC BLOOD PRESSURE: 121 MMHG | HEART RATE: 96 BPM | WEIGHT: 226 LBS | DIASTOLIC BLOOD PRESSURE: 73 MMHG | OXYGEN SATURATION: 97 % | HEIGHT: 76 IN

## 2023-03-08 DIAGNOSIS — I48.0 PAROXYSMAL ATRIAL FIBRILLATION (HCC): ICD-10-CM

## 2023-03-08 DIAGNOSIS — I10 PRIMARY HYPERTENSION: ICD-10-CM

## 2023-03-08 DIAGNOSIS — R55 VASOVAGAL SYNCOPE: Primary | ICD-10-CM

## 2023-03-08 DIAGNOSIS — J43.1 PANLOBULAR EMPHYSEMA (HCC): ICD-10-CM

## 2023-03-08 DIAGNOSIS — Z72.0 TOBACCO ABUSE: ICD-10-CM
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
## 2023-03-08 NOTE — LETTER
3/31/2023    Patient: Melvina Merida   YOB: 1955   Date of Visit: 3/8/2023     Jude Byrne, [address]    Dear Jude Byrne MD,      Thank you for referring Mr. Yariel Benjamin to [address] for evaluation. My notes for this consultation are attached. If you have questions, please do not hesitate to call me. I look forward to following your patient along with you.       Sincerely,    Benji Orosco MD

## 2023-03-08 NOTE — TELEPHONE ENCOUNTER
Enrolled with [employer] - Ordered and being shipped to patient's home address on file. ETA within 5-7 business days. Message  Received: Today  DANIEL Barakat; Nuria Vo  Thank you! Previous Messages     ----- Message -----   From: Pawan Dior MD   Sent: 3/8/2023  12:25 PM EST   To: Anna Burton RN     If you have another episode of syncope we may stop the HCTZ but for now continue current medications. We will get a 7 Day Holter mailed to you and then return at we will call you with results.    See us back at [facility] in 6 months

## 2023-03-08 NOTE — PROGRESS NOTES
1. \"Have you been to the ER, urgent care clinic since your last visit? Hospitalized since your last visit? \" No    2. \"Have you seen or consulted any other health care providers outside of the [address] [place] [address] since your last visit? \" No         Health Maintenance Due   Topic Date Due    Eye Exam Retinal or Dilated  Never done    Low dose CT lung screening  11/04/2022    Medicare Yearly Exam  04/02/2023

## 2023-03-08 NOTE — PATIENT INSTRUCTIONS
If you have another episode of syncope we may stop the HCTZ but for now continue current medications. We will get a 7 Day Holter mailed to you and then return at we will call you with results.   See us back at [facility] in 6 months

## 2023-05-11 ENCOUNTER — OFFICE VISIT (OUTPATIENT)
Age: 68
End: 2023-05-11
Payer: MEDICARE

## 2023-05-11 VITALS
RESPIRATION RATE: 16 BRPM | BODY MASS INDEX: 27.74 KG/M2 | WEIGHT: 227.8 LBS | SYSTOLIC BLOOD PRESSURE: 122 MMHG | TEMPERATURE: 97.9 F | HEART RATE: 79 BPM | OXYGEN SATURATION: 99 % | HEIGHT: 76 IN | DIASTOLIC BLOOD PRESSURE: 75 MMHG

## 2023-05-11 DIAGNOSIS — E11.21 TYPE 2 DIABETES WITH NEPHROPATHY (HCC): Primary | ICD-10-CM

## 2023-05-11 DIAGNOSIS — Z87.891 PERSONAL HISTORY OF TOBACCO USE: ICD-10-CM

## 2023-05-11 DIAGNOSIS — M54.16 LUMBAR RADICULOPATHY: ICD-10-CM

## 2023-05-11 DIAGNOSIS — Z72.0 TOBACCO ABUSE: ICD-10-CM

## 2023-05-11 DIAGNOSIS — J41.0 SIMPLE CHRONIC BRONCHITIS (HCC): ICD-10-CM

## 2023-05-11 DIAGNOSIS — Z53.20 REFUSAL OF STATIN MEDICATION BY PATIENT: ICD-10-CM

## 2023-05-11 DIAGNOSIS — I10 PRIMARY HYPERTENSION: ICD-10-CM

## 2023-05-11 DIAGNOSIS — Z00.00 MEDICARE ANNUAL WELLNESS VISIT, SUBSEQUENT: ICD-10-CM

## 2023-05-11 LAB — HBA1C MFR BLD: 8.5 %

## 2023-05-11 PROCEDURE — 83036 HEMOGLOBIN GLYCOSYLATED A1C: CPT | Performed by: FAMILY MEDICINE

## 2023-05-11 PROCEDURE — 3078F DIAST BP <80 MM HG: CPT | Performed by: FAMILY MEDICINE

## 2023-05-11 PROCEDURE — G0296 VISIT TO DETERM LDCT ELIG: HCPCS | Performed by: FAMILY MEDICINE

## 2023-05-11 PROCEDURE — 3074F SYST BP LT 130 MM HG: CPT | Performed by: FAMILY MEDICINE

## 2023-05-11 PROCEDURE — 1123F ACP DISCUSS/DSCN MKR DOCD: CPT | Performed by: FAMILY MEDICINE

## 2023-05-11 PROCEDURE — 99213 OFFICE O/P EST LOW 20 MIN: CPT | Performed by: FAMILY MEDICINE

## 2023-05-11 PROCEDURE — G0439 PPPS, SUBSEQ VISIT: HCPCS | Performed by: FAMILY MEDICINE

## 2023-05-11 RX ORDER — PEN NEEDLE, DIABETIC 32 GX 1/4"
NEEDLE, DISPOSABLE MISCELLANEOUS
COMMUNITY
Start: 2023-02-13

## 2023-05-11 RX ORDER — CYCLOBENZAPRINE HCL 5 MG
TABLET ORAL
COMMUNITY
Start: 2023-05-08

## 2023-05-11 RX ORDER — EMPAGLIFLOZIN 25 MG/1
25 TABLET, FILM COATED ORAL DAILY
COMMUNITY
Start: 2023-02-13 | End: 2023-05-12

## 2023-05-11 RX ORDER — INSULIN GLARGINE 100 [IU]/ML
40 INJECTION, SOLUTION SUBCUTANEOUS NIGHTLY
Qty: 5 ADJUSTABLE DOSE PRE-FILLED PEN SYRINGE | Refills: 3 | Status: SHIPPED | OUTPATIENT
Start: 2023-05-11

## 2023-05-11 RX ORDER — INSULIN GLARGINE 100 [IU]/ML
40 INJECTION, SOLUTION SUBCUTANEOUS NIGHTLY
COMMUNITY
Start: 2022-04-05 | End: 2023-05-11 | Stop reason: SDUPTHER

## 2023-05-11 RX ORDER — LISINOPRIL AND HYDROCHLOROTHIAZIDE 20; 12.5 MG/1; MG/1
1 TABLET ORAL DAILY
Qty: 90 TABLET | Refills: 3 | Status: SHIPPED | OUTPATIENT
Start: 2023-05-11

## 2023-05-11 RX ORDER — OXYCODONE HYDROCHLORIDE AND ACETAMINOPHEN 5; 325 MG/1; MG/1
TABLET ORAL
COMMUNITY
Start: 2023-04-17

## 2023-05-11 RX ORDER — GLIPIZIDE 10 MG/1
10 TABLET ORAL 2 TIMES DAILY
COMMUNITY
Start: 2023-02-13

## 2023-05-11 RX ORDER — LISINOPRIL AND HYDROCHLOROTHIAZIDE 20; 12.5 MG/1; MG/1
1 TABLET ORAL DAILY
COMMUNITY
Start: 2022-03-28 | End: 2023-05-11 | Stop reason: SDUPTHER

## 2023-05-11 RX ORDER — ALBUTEROL SULFATE 90 UG/1
2 AEROSOL, METERED RESPIRATORY (INHALATION) EVERY 4 HOURS PRN
COMMUNITY
Start: 2022-10-13

## 2023-05-11 SDOH — ECONOMIC STABILITY: FOOD INSECURITY: WITHIN THE PAST 12 MONTHS, THE FOOD YOU BOUGHT JUST DIDN'T LAST AND YOU DIDN'T HAVE MONEY TO GET MORE.: SOMETIMES TRUE

## 2023-05-11 SDOH — ECONOMIC STABILITY: HOUSING INSECURITY
IN THE LAST 12 MONTHS, WAS THERE A TIME WHEN YOU DID NOT HAVE A STEADY PLACE TO SLEEP OR SLEPT IN A SHELTER (INCLUDING NOW)?: NO

## 2023-05-11 SDOH — ECONOMIC STABILITY: FOOD INSECURITY: WITHIN THE PAST 12 MONTHS, YOU WORRIED THAT YOUR FOOD WOULD RUN OUT BEFORE YOU GOT MONEY TO BUY MORE.: OFTEN TRUE

## 2023-05-11 SDOH — ECONOMIC STABILITY: INCOME INSECURITY: HOW HARD IS IT FOR YOU TO PAY FOR THE VERY BASICS LIKE FOOD, HOUSING, MEDICAL CARE, AND HEATING?: HARD

## 2023-05-11 ASSESSMENT — ENCOUNTER SYMPTOMS
COUGH: 1
SHORTNESS OF BREATH: 1

## 2023-05-11 ASSESSMENT — PATIENT HEALTH QUESTIONNAIRE - PHQ9
SUM OF ALL RESPONSES TO PHQ QUESTIONS 1-9: 2
1. LITTLE INTEREST OR PLEASURE IN DOING THINGS: 1
SUM OF ALL RESPONSES TO PHQ QUESTIONS 1-9: 2
SUM OF ALL RESPONSES TO PHQ9 QUESTIONS 1 & 2: 2
2. FEELING DOWN, DEPRESSED OR HOPELESS: 1

## 2023-05-11 ASSESSMENT — LIFESTYLE VARIABLES: HOW OFTEN DO YOU HAVE A DRINK CONTAINING ALCOHOL: NEVER
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
## 2023-05-11 NOTE — PATIENT INSTRUCTIONS
may run in your family, and various assessments and screenings as appropriate. After reviewing your medical record and screening and assessments performed today your provider may have ordered immunizations, labs, imaging, and/or referrals for you. A list of these orders (if applicable) as well as your Preventive Care list are included within your After Visit Summary for your review. Other Preventive Recommendations:    A preventive eye exam performed by an eye specialist is recommended every 1-2 years to screen for glaucoma; cataracts, macular degeneration, and other eye disorders. A preventive dental visit is recommended every 6 months. Try to get at least 150 minutes of exercise per week or 10,000 steps per day on a pedometer . Order or download the FREE \"Exercise & Physical Activity: Your Everyday Guide\" from The [employer]. Call 0-362.473.8389 or search The [employer] online. You need 4072-7511 mg of calcium and 9376-7873 IU of vitamin D per day. It is possible to meet your calcium requirement with diet alone, but a vitamin D supplement is usually necessary to meet this goal.  When exposed to the sun, use a sunscreen that protects against both UVA and UVB radiation with an SPF of 30 or greater. Reapply every 2 to 3 hours or after sweating, drying off with a towel, or swimming. Always wear a seat belt when traveling in a car. Always wear a helmet when riding a bicycle or motorcycle.

## 2023-05-11 NOTE — ACP (ADVANCE CARE PLANNING)
I discussed Advance Care Planning with Dale Newberry today which included the importance of making their choices for care and treatment in the case of a health event that adversely affects their decision-making abilities. He has not completed the Advance Care Directives. He does not have an active health care agent at this time. Dale Newberry was encouraged to complete the declaration forms and provide a signed copy of his medical records. He was referred to ACP specialist to assist with completion. I advised patient we would continue this discussion at future visits.

## 2023-05-12 ENCOUNTER — CLINICAL DOCUMENTATION (OUTPATIENT)
Dept: SPIRITUAL SERVICES | Age: 68
End: 2023-05-12

## 2023-05-12 LAB
BUN SERPL-MCNC: 18 MG/DL (ref 8–27)
BUN/CREAT SERPL: 19 (ref 10–24)
CALCIUM SERPL-MCNC: 9.4 MG/DL (ref 8.6–10.2)
CHLORIDE SERPL-SCNC: 99 MMOL/L (ref 96–106)
CO2 SERPL-SCNC: 25 MMOL/L (ref 20–29)
CREAT SERPL-MCNC: 0.94 MG/DL (ref 0.76–1.27)
EGFRCR SERPLBLD CKD-EPI 2021: 89 ML/MIN/1.73
GLUCOSE SERPL-MCNC: 208 MG/DL (ref 70–99)
POTASSIUM SERPL-SCNC: 4.9 MMOL/L (ref 3.5–5.2)
SODIUM SERPL-SCNC: 139 MMOL/L (ref 134–144)

## 2023-05-12 RX ORDER — EMPAGLIFLOZIN 25 MG/1
TABLET, FILM COATED ORAL
Qty: 90 TABLET | Refills: 1 | Status: SHIPPED | OUTPATIENT
Start: 2023-05-12

## 2023-05-12 NOTE — TELEPHONE ENCOUNTER
Requested Prescriptions     Pending Prescriptions Disp Refills    JARDIANCE 25 MG tablet [Pharmacy Med Name: Jardiance 25 MG Oral Tablet] 90 tablet 1     Sig: Take 1 tablet by mouth once daily     Last Office Visit 05/11/2023    Hemoglobin A1C   Date Value Ref Range Status   04/01/2022 9.6 (H) 4.0 - 5.6 % Final     Comment:     NEW METHOD PLEASE NOTE NEW REFERENCE RANGE  (NOTE)  HbA1C Interpretive Ranges  <5.7              Normal  5.7 - 6.4         Consider Prediabetes  >6.5              Consider Diabetes

## 2023-05-12 NOTE — ACP (ADVANCE CARE PLANNING)
Advance Care Planning   Ambulatory ACP Specialist Patient Outreach    Date:  5/12/2023    ACP Specialist:  Clifton Iron    Outreach call to patient in follow-up to ACP Specialist referral from:  Magan Beatty MD    [x] PCP  [] Provider   [] Ambulatory Care Management [] Other     For:                  [x] Advance Directive Assistance              [] Complete Portable DNR order              [] Complete POST/POLST/MOST              [] Code Status Discussion             [] Discuss Goals of Care             [] Early ACP Decision-Making              [x] Other (Specify)  HCDM    Date Referral Received:  5/11/2023    Next Step:   [x] ACP scheduled conversation  [] Outreach again in one week               [x] Email / Mail [address]  [x] Email / Mail Advance Directive   [] Closing referral.  Routing closure to referring provider/staff and to ACP Specialist . [] Closure letter mailed to patient with invitation to contact ACP Specialist if / when ready. [] Other (Specify here): Outreaches:       [x] 1st -  Date:  5/12/2023                              Intervention:  [x] Spoke with Patient   [] Left Voice mail [] Email / Mail    [] [employer]  [] Other [id number]) : Outcomes:  Patient is agreeable to a conversation with ACP Specialist Elisa Cervantes on Friday, 5/19/2023 at 3:00 PM.  A copy of [facility] and ACP information sheets mailed to patient's confirmed home  address on file. [] 2nd -  Date:                                Intervention:  [] Spoke with Patient  [] Left Voice mail [] Email / Mail    [] [employer]  [] Other [id number]) : Outcomes:                [] 3rd -  Date:                               Intervention:  [] Spoke with Patient   [] Left Voice mail [] Email / Mail    [] [employer]  [] Other [id number]) :                       Outcomes:           []  Additional Outreach -  Date:     (Specify Dates & special circumstances):

## 2023-05-15 RX ORDER — CALCIUM CITRATE/VITAMIN D3 200MG-6.25
TABLET ORAL
Qty: 100 EACH | Refills: 3 | Status: SHIPPED | OUTPATIENT
Start: 2023-05-15

## 2023-05-18 ENCOUNTER — CLINICAL DOCUMENTATION (OUTPATIENT)
Dept: SPIRITUAL SERVICES | Age: 68
End: 2023-05-18

## 2023-05-18 DIAGNOSIS — Z79.4 LONG TERM (CURRENT) USE OF INSULIN (HCC): ICD-10-CM

## 2023-05-18 DIAGNOSIS — E11.21 TYPE 2 DIABETES WITH NEPHROPATHY (HCC): Primary | ICD-10-CM

## 2023-05-18 RX ORDER — LANCETS 33 GAUGE
EACH MISCELLANEOUS
Qty: 100 EACH | Refills: 3 | Status: SHIPPED | OUTPATIENT
Start: 2023-05-18

## 2023-05-18 RX ORDER — BLOOD-GLUCOSE METER
EACH MISCELLANEOUS
Qty: 1 KIT | Refills: 0 | Status: SHIPPED | OUTPATIENT
Start: 2023-05-18

## 2023-05-18 NOTE — TELEPHONE ENCOUNTER
[facility] states insurance will only pay for One Touch testing supplies. They are wondering if we can send in a new script for the meter, lancets, and test strips. I will also place this in the black box. Thanks!

## 2023-05-18 NOTE — TELEPHONE ENCOUNTER
Prescriptions pending, pharmacy verified. Encounter forwarded to physician for review and authorization.

## 2023-05-18 NOTE — PROGRESS NOTES
Advance Care Planning   Ambulatory ACP Specialist Patient Outreach    Date:  5/18/2023    ACP Specialist:  CHASTITY Yost    Outreach call to patient in follow-up to ACP Specialist referral Gutierrez Treadwell MD    [x] PCP  [] Provider   [] Ambulatory Care Management [] Other     For:                  [x] Advance Directive Assistance              [] Complete Portable DNR order              [] Complete POST/POLST/MOST              [] Code Status Discussion             [] Discuss Goals of Care             [] Early ACP Decision-Making              [x] Other (Specify): [facility]    Date Referral Received: 05/11/2023    Next Step:   [] ACP scheduled conversation  [] Outreach again in one week               [] Email / Mail ACP Info Sheets  [] Email / Mail Advance Directive   [] Closing referral.  Routing closure to referring provider/staff and to ACP Specialist . [] Closure letter mailed to patient with invitation to contact ACP Specialist if / when ready. [] Other (Specify here): Outreaches:            [x]  Additional Outreach -  Date:   05/18/2023       Outcomes:  ACP specialist made a reminder call for pt's scheduled ACP conversation appt for tomorrow 05/19 at 3pm. Pt did not answer this call, which resulted in a vm left encouraging this pt to return this call.          Thank you for this referral.

## 2023-05-19 ENCOUNTER — CLINICAL DOCUMENTATION (OUTPATIENT)
Dept: SPIRITUAL SERVICES | Age: 68
End: 2023-05-19

## 2023-05-19 NOTE — ACP (ADVANCE CARE PLANNING)
Advance Care Planning   Ambulatory ACP Specialist Patient Outreach    Date:  5/19/2023    ACP Specialist:  KAITY Rascon    Outreach call to patient in follow-up to ACP Specialist referral Elliott Ramos MD    [x] PCP  [] Provider   [] Ambulatory Care Management [] Other     For:                  [] Advance Directive Assistance              [] Complete Portable DNR order              [] Complete POST/POLST/MOST              [] Code Status Discussion             [] Discuss Goals of Care             [] Early ACP Decision-Making              [x] Other (Specify) HCDM    Date Referral Received:05/11/2023    Next Step:   [] ACP scheduled conversation  [x] Outreach again in one week               [] Email / Mail [address]  [] Email / Mail Advance Directive   [] Closing referral.  Routing closure to referring provider/staff and to ACP Specialist . [] Closure letter mailed to patient with invitation to contact ACP Specialist if / when ready. [] Other (Specify here): Outreaches:            [x] 2nd -  Date:  05/19/2023               Intervention:  [] Spoke with Patient  [x] Left Voice mail [] Email / Mail    [] [employer]  [] Other [id number]) : Outcomes:Call placed to pt for scheduled 3pm ACP visit today. No answer; left VM on \"[place]\" mailbox and will make another outreach attempt in about 1 week. [] 3rd -  Date:                Intervention:  [] Spoke with Patient   [] Left Voice mail [] Email / Mail    [] [employer]  [] Other [id number]) : Outcomes:           []  Additional Outreach -  Date:     (Specify Dates & special circumstances): Outcomes:          Thank you for this referral.

## 2023-06-20 DIAGNOSIS — J41.0 SIMPLE CHRONIC BRONCHITIS (HCC): ICD-10-CM

## 2023-06-21 NOTE — TELEPHONE ENCOUNTER
Medication Refill Request    Buddie Crigler is requesting a refill of the following medication(s):   Requested Prescriptions     Pending Prescriptions Disp Refills    tiotropium (SPIRIVA RESPIMAT) 2.5 MCG/ACT AERS inhaler [Pharmacy Med Name: Spiriva Respimat 2.5 MCG/ACT Inhalation Aerosol Solution] 12 g 0     Sig: INHALE 2 PUFFS BY MOUTH ONCE DAILY    Insulin Glargine-yfgn 100 UNIT/ML SOPN [Pharmacy Med Name: Insulin Glargine-yfgn 100 UNIT/ML Subcutaneous Solution Pen-injector] 30 mL 0     Sig: INJECT 40 UNITS SUBCUTANEOUSLY NIGHTLY    PROAIR  (90 Base) MCG/ACT inhaler [Pharmacy Med Name: ProAir  (90 Base) MCG/ACT Inhalation Aerosol Solution] 9 g 0     Sig: INHALE 2 PUFFS BY MOUTH EVERY 4 HOURS AS NEEDED FOR WHEEZING      Last provider to prescribe medication: Dr. Modesta Balderas  Last Office Visit Date: 05/11/2023    Please send refill to:     [address] [place] [address]  Phone: [phone] Fax: [phone]

## 2023-06-22 RX ORDER — INSULIN GLARGINE-YFGN 100 [IU]/ML
INJECTION, SOLUTION SUBCUTANEOUS
Qty: 30 ML | Refills: 3 | Status: SHIPPED | OUTPATIENT
Start: 2023-06-22

## 2023-07-28 ENCOUNTER — TELEPHONE (OUTPATIENT)
Age: 68
End: 2023-07-28

## 2023-07-28 NOTE — TELEPHONE ENCOUNTER
----- Message from Francisca Anderson sent at 7/28/2023  4:05 PM EDT -----  Subject: Message to Provider    QUESTIONS  Information for Provider? pt called in stating he received a letter from   his insurance saying this office is no longer in network, he is wondering   what insurance the office does accept. Unable to reach office for   transfer. Please follow up to advise.  ---------------------------------------------------------------------------  --------------  Osvaldo WEBB  [phone]; OK to leave message on voicemail  ---------------------------------------------------------------------------  --------------  SCRIPT ANSWERS  Relationship to Patient?  Self

## 2023-08-23 ENCOUNTER — TELEPHONE (OUTPATIENT)
Age: 68
End: 2023-08-23

## 2023-08-23 NOTE — TELEPHONE ENCOUNTER
----- Message from Jessica Church sent at 8/16/2023  3:39 PM EDT -----  Subject: Message to Provider    QUESTIONS  Information for Provider? Heather from [address] [place] [address] is recommended patient be   started on Statin due to diabetes to help reduce cardio issues. If this is   approved  can forward to patient's pharmacy.   ---------------------------------------------------------------------------  --------------  [address] Charanjit  [phone]; OK to leave message on voicemail  ---------------------------------------------------------------------------  --------------  SCRIPT ANSWERS  Relationship to Patient? Covered Entity  Covered Entity Type? Health Insurance? Representative Name?  Heather

## 2023-10-03 RX ORDER — PEN NEEDLE, DIABETIC 32 GX 1/4"
NEEDLE, DISPOSABLE MISCELLANEOUS
Qty: 100 EACH | Refills: 3 | Status: SHIPPED | OUTPATIENT
Start: 2023-10-03

## 2023-11-07 DIAGNOSIS — J41.0 SIMPLE CHRONIC BRONCHITIS (HCC): ICD-10-CM

## 2023-11-07 RX ORDER — FLUTICASONE PROPIONATE AND SALMETEROL XINAFOATE 115; 21 UG/1; UG/1
2 AEROSOL, METERED RESPIRATORY (INHALATION) 2 TIMES DAILY
Qty: 12 G | Refills: 2 | Status: SHIPPED | OUTPATIENT
Start: 2023-11-07

## 2024-01-29 RX ORDER — EMPAGLIFLOZIN 25 MG/1
TABLET, FILM COATED ORAL
Qty: 90 TABLET | Refills: 0 | Status: SHIPPED | OUTPATIENT
Start: 2024-01-29

## 2024-01-29 NOTE — TELEPHONE ENCOUNTER
Medication Refill Request    Markell Lowery is requesting a refill of the following medication(s):   Requested Prescriptions     Pending Prescriptions Disp Refills    JARDIANCE 25 MG tablet [Pharmacy Med Name: Jardiance 25 MG Oral Tablet] 90 tablet 0     Sig: Take 1 tablet by mouth once daily        Listed PCP is Renee Peterson MD   Last provider to prescribe medication: Dr. Peterson  Last Date of Medication Prescribed: 05/12/2023   Date of Last Office Visit at [facility]: 05/11/2023   Last Labs:  Hemoglobin A1C   Date Value Ref Range Status   04/01/2022 9.6 (H) 4.0 - 5.6 % Final     Comment:     NEW METHOD PLEASE NOTE NEW REFERENCE RANGE  (NOTE)  HbA1C Interpretive Ranges  <5.7              Normal  5.7 - 6.4         Consider Prediabetes  >6.5              Consider Diabetes         Future Appointment: No future appointments.    Refill Request Note: Overdue for 3 month follow up.     Please send refill to:    [facility] [address] [place] [address] [facility] -  [phone] - F [phone]  [address] [id number]  Phone: [phone] Fax: [phone]    [facility] [address] [place] [address] - [facility] -  [phone] - F [phone]  [address] [place] [address]  Phone: [phone] Fax: [phone]

## 2024-03-01 DIAGNOSIS — J41.0 SIMPLE CHRONIC BRONCHITIS (HCC): ICD-10-CM

## 2024-03-08 NOTE — TELEPHONE ENCOUNTER
Medication Refill Request    Markell Lowery is requesting a refill of the following medication(s):   Requested Prescriptions     Pending Prescriptions Disp Refills    ADVAIR -21 MCG/ACT inhaler [Pharmacy Med Name: Advair -21 MCG/ACT Inhalation Aerosol] 12 g 0     Sig: Inhale 2 puffs by mouth twice daily        Listed PCP is Renee Peterson MD   Last provider to prescribe medication: Dr. Peterson  Last Date of Medication Prescribed: 11/07/2023   Date of Last Office Visit at [facility]: 05/11/2023     Future Appointment:   Future Appointments   Date Time Provider Department Center   3/28/2024  9:20 AM Marva Lemos MD [facility] BS AMB       Please send refill to:    [facility] [address] [place] [address] [facility] -  [phone] - F [phone]  [address] [facility] [address]  Phone: [phone] Fax: [phone]    [facility] [address] [place] [address] [facility] -  [phone] - F [phone]  [address] [place] [address]  Phone: [phone] Fax: [phone]

## 2024-03-11 RX ORDER — FLUTICASONE PROPIONATE AND SALMETEROL XINAFOATE 115; 21 UG/1; UG/1
2 AEROSOL, METERED RESPIRATORY (INHALATION) 2 TIMES DAILY
Qty: 12 G | Refills: 3 | Status: SHIPPED | OUTPATIENT
Start: 2024-03-11
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
## 2024-04-11 RX ORDER — INSULIN GLARGINE 100 [IU]/ML
INJECTION, SOLUTION SUBCUTANEOUS
Qty: 30 ML | Refills: 3 | Status: SHIPPED | OUTPATIENT
Start: 2024-04-11

## 2024-04-11 NOTE — TELEPHONE ENCOUNTER
Medication Refill Request    Markell Lowery is requesting a refill of the following medication(s):   Requested Prescriptions     Pending Prescriptions Disp Refills    LANTUS SOLOSTAR 100 UNIT/ML injection pen [Pharmacy Med Name: Lantus SoloStar 100 UNIT/ML Subcutaneous Solution Pen-injector] 30 mL 0     Sig: INJECT 40 UNITS SUBCUTANEOUSLY NIGHTLY        Listed PCP is Renee Peterson MD   Last provider to prescribe medication: Dr. Peterson  Last Date of Medication Prescribed: 05/11/2023   Date of Last Office Visit at [facility]: 05/11/2023   Last Labs: 05/11/2023  Hemoglobin A1C, POC  % 8.5     Future Appointment:   Future Appointments   Date Time Provider Department Center   5/28/2024 10:40 AM Renee Peterson MD [facility] BS AMB       Please send refill to:    [facility] [address] [place] [address] [facility] -  [phone] - F [phone]  [address] [facility] [address]  Phone: [phone] Fax: [phone]    [facility] [address] [place] [address] - [address] [place] [address] -  [phone] - F [phone]  [address] [place] [address]  Phone: [phone] Fax: [phone]

## 2024-06-12 ENCOUNTER — ENROLLMENT (OUTPATIENT)
Dept: PHARMACY | Facility: CLINIC | Age: 69
End: 2024-06-12

## 2024-06-25 DIAGNOSIS — I10 PRIMARY HYPERTENSION: ICD-10-CM

## 2024-06-25 DIAGNOSIS — J41.0 SIMPLE CHRONIC BRONCHITIS (HCC): ICD-10-CM

## 2024-06-27 RX ORDER — INSULIN GLARGINE 100 [IU]/ML
INJECTION, SOLUTION SUBCUTANEOUS
Qty: 6 ML | Refills: 0 | Status: SHIPPED | OUTPATIENT
Start: 2024-06-27

## 2024-06-27 RX ORDER — LISINOPRIL AND HYDROCHLOROTHIAZIDE 20; 12.5 MG/1; MG/1
1 TABLET ORAL DAILY
Qty: 90 TABLET | Refills: 0 | Status: SHIPPED | OUTPATIENT
Start: 2024-06-27

## 2024-06-27 NOTE — TELEPHONE ENCOUNTER
Pt called to check the status of this request. He stated that he has enough to last until the end of the week.    Thank you

## 2024-07-10 NOTE — TELEPHONE ENCOUNTER
Medication Refill Request    Attempted to contact patient to verify Pharmacy patient would like medication. Per Fax we will send to [facility] in [place], [place]      Markell Lowery is requesting a refill of the following medication(s):   Requested Prescriptions     Pending Prescriptions Disp Refills    empagliflozin (JARDIANCE) 25 MG tablet 90 tablet 0     Sig: Take 1 tablet by mouth daily        Listed PCP is Renee Peterson MD   Last provider to prescribe medication: Dr. Gloria on 6/26/24  Date of Last Office Visit at [facility]: 5/11/23 with Dr. Peterson    FUTURE APPOINTMENT:   Future Appointments   Date Time Provider Department Center   7/30/2024  9:20 AM Renee Peterson MD [facility] BS AMB       Please send refill to:    [facility] [address] [place] [address] [facility] - P [phone] - F [phone] [address] [facility] [address]  Phone: [phone] Fax: [phone]    [facility] [address] [place], [place] [address] [facility] -  [phone] - F [phone]  [address] [place] [address]  Phone: [phone] Fax: [phone]      Please review request and approve or deny with recommendations within 48 hours.

## 2024-07-30 ENCOUNTER — OFFICE VISIT (OUTPATIENT)
Age: 69
End: 2024-07-30
Payer: MEDICARE

## 2024-07-30 VITALS
HEIGHT: 76 IN | BODY MASS INDEX: 26.33 KG/M2 | OXYGEN SATURATION: 97 % | SYSTOLIC BLOOD PRESSURE: 119 MMHG | WEIGHT: 216.2 LBS | HEART RATE: 79 BPM | TEMPERATURE: 97.9 F | DIASTOLIC BLOOD PRESSURE: 85 MMHG | RESPIRATION RATE: 17 BRPM

## 2024-07-30 DIAGNOSIS — N18.31 CHRONIC KIDNEY DISEASE, STAGE 3A (HCC): ICD-10-CM

## 2024-07-30 DIAGNOSIS — Z87.891 PERSONAL HISTORY OF TOBACCO USE: ICD-10-CM

## 2024-07-30 DIAGNOSIS — J41.0 SIMPLE CHRONIC BRONCHITIS (HCC): ICD-10-CM

## 2024-07-30 DIAGNOSIS — I48.0 PAROXYSMAL ATRIAL FIBRILLATION (HCC): ICD-10-CM

## 2024-07-30 DIAGNOSIS — L98.491 SKIN ULCER, LIMITED TO BREAKDOWN OF SKIN (HCC): ICD-10-CM

## 2024-07-30 DIAGNOSIS — E11.40 TYPE 2 DIABETES MELLITUS WITH DIABETIC NEUROPATHY, WITH LONG-TERM CURRENT USE OF INSULIN (HCC): Primary | ICD-10-CM

## 2024-07-30 DIAGNOSIS — Z79.4 TYPE 2 DIABETES MELLITUS WITH DIABETIC NEUROPATHY, WITH LONG-TERM CURRENT USE OF INSULIN (HCC): Primary | ICD-10-CM

## 2024-07-30 LAB
ALBUMIN SERPL-MCNC: 3.8 G/DL (ref 3.5–5)
ALBUMIN/GLOB SERPL: 1.2 (ref 1.1–2.2)
ALP SERPL-CCNC: 55 U/L (ref 45–117)
ALT SERPL-CCNC: 42 U/L (ref 12–78)
ANION GAP SERPL CALC-SCNC: 7 MMOL/L (ref 5–15)
AST SERPL-CCNC: 31 U/L (ref 15–37)
BILIRUB SERPL-MCNC: 0.6 MG/DL (ref 0.2–1)
BUN SERPL-MCNC: 21 MG/DL (ref 6–20)
BUN/CREAT SERPL: 19 (ref 12–20)
CALCIUM SERPL-MCNC: 9.6 MG/DL (ref 8.5–10.1)
CHLORIDE SERPL-SCNC: 97 MMOL/L (ref 97–108)
CHOLEST SERPL-MCNC: 153 MG/DL
CO2 SERPL-SCNC: 31 MMOL/L (ref 21–32)
CREAT SERPL-MCNC: 1.12 MG/DL (ref 0.7–1.3)
ERYTHROCYTE [DISTWIDTH] IN BLOOD BY AUTOMATED COUNT: 12.5 % (ref 11.5–14.5)
EST. AVERAGE GLUCOSE BLD GHB EST-MCNC: 177 MG/DL
GLOBULIN SER CALC-MCNC: 3.3 G/DL (ref 2–4)
GLUCOSE SERPL-MCNC: 147 MG/DL (ref 65–100)
HBA1C MFR BLD: 7.8 % (ref 4–5.6)
HCT VFR BLD AUTO: 46.6 % (ref 36.6–50.3)
HDLC SERPL-MCNC: 57 MG/DL
HDLC SERPL: 2.7 (ref 0–5)
HGB BLD-MCNC: 15.8 G/DL (ref 12.1–17)
LDLC SERPL CALC-MCNC: 73.8 MG/DL (ref 0–100)
MCH RBC QN AUTO: 29.6 PG (ref 26–34)
MCHC RBC AUTO-ENTMCNC: 33.9 G/DL (ref 30–36.5)
MCV RBC AUTO: 87.4 FL (ref 80–99)
NRBC # BLD: 0 K/UL (ref 0–0.01)
NRBC BLD-RTO: 0 PER 100 WBC
PLATELET # BLD AUTO: 239 K/UL (ref 150–400)
PMV BLD AUTO: 10.7 FL (ref 8.9–12.9)
POTASSIUM SERPL-SCNC: 4.5 MMOL/L (ref 3.5–5.1)
PROT SERPL-MCNC: 7.1 G/DL (ref 6.4–8.2)
RBC # BLD AUTO: 5.33 M/UL (ref 4.1–5.7)
SODIUM SERPL-SCNC: 135 MMOL/L (ref 136–145)
TRIGL SERPL-MCNC: 111 MG/DL
VLDLC SERPL CALC-MCNC: 22.2 MG/DL
WBC # BLD AUTO: 9.2 K/UL (ref 4.1–11.1)

## 2024-07-30 PROCEDURE — 1123F ACP DISCUSS/DSCN MKR DOCD: CPT | Performed by: FAMILY MEDICINE

## 2024-07-30 PROCEDURE — 3079F DIAST BP 80-89 MM HG: CPT | Performed by: FAMILY MEDICINE

## 2024-07-30 PROCEDURE — G0296 VISIT TO DETERM LDCT ELIG: HCPCS | Performed by: FAMILY MEDICINE

## 2024-07-30 PROCEDURE — 99214 OFFICE O/P EST MOD 30 MIN: CPT | Performed by: FAMILY MEDICINE

## 2024-07-30 PROCEDURE — 3074F SYST BP LT 130 MM HG: CPT | Performed by: FAMILY MEDICINE

## 2024-07-30 PROCEDURE — G2211 COMPLEX E/M VISIT ADD ON: HCPCS | Performed by: FAMILY MEDICINE

## 2024-07-30 RX ORDER — INSULIN GLARGINE 100 [IU]/ML
INJECTION, SOLUTION SUBCUTANEOUS
Qty: 30 ML | Refills: 3 | Status: SHIPPED | OUTPATIENT
Start: 2024-07-30

## 2024-07-30 RX ORDER — OXYCODONE AND ACETAMINOPHEN 7.5; 325 MG/1; MG/1
1 TABLET ORAL EVERY 6 HOURS PRN
COMMUNITY
Start: 2024-07-05

## 2024-07-30 ASSESSMENT — ENCOUNTER SYMPTOMS
CHEST TIGHTNESS: 0
SHORTNESS OF BREATH: 0

## 2024-07-30 NOTE — PROGRESS NOTES
Identified pt with two pt identifiers(name and ). Reviewed record in preparation for visit and have obtained necessary documentation.  Chief Complaint   Patient presents with    Diabetes     Bilateral Foot Numbness and Pain; recurrent infection of bilateral legs         Health Maintenance Due   Topic    Pneumococcal 65+ years Vaccine (1 of 2 - PCV)    Diabetic retinal exam     DTaP/Tdap/Td vaccine (1 - Tdap)    Shingles vaccine (1 of 2)    Respiratory Syncytial Virus (RSV) Pregnant or age 60 yrs+ (1 - 1-dose 60+ series)    Lung Cancer Screening &/or Counseling     A1C test (Diabetic or Prediabetic)     Diabetic foot exam     COVID-19 Vaccine (2 -  season)    Annual Wellness Visit (Medicare Advantage)     Diabetic Alb to Cr ratio (uACR) test     Lipids     Depression Screen     GFR test (Diabetes, CKD 3-4, OR last GFR 15-59)        Vitals:    24 0903   BP: 119/85   Site: Right Upper Arm   Position: Sitting   Cuff Size: Large Adult   Pulse: 79   Resp: 17   Temp: 97.9 °F (36.6 °C)   TempSrc: Temporal   SpO2: 97%   Weight: 98.1 kg (216 lb 3.2 oz)   Height: 1.93 m (6' 4\")         \"Have you been to the ER, urgent care clinic since your last visit?  Hospitalized since your last visit?\"    NO    “Have you seen or consulted any other health care providers outside of [facility] since your last visit?”    NO      Click Here for Release of Records Request     This patient is accompanied in the office by his Self .  I have received verbal consent from Markell Lowery to discuss any/all medical information while they are present in the room.
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
## 2024-08-02 ENCOUNTER — TELEPHONE (OUTPATIENT)
Age: 69
End: 2024-08-02

## 2024-08-02 NOTE — TELEPHONE ENCOUNTER
This writer reached out to patient to notify patient of lab results. Patient verified by 2 identifiers by this writer. Patient notified of the following lab results per Dr Peterson. His A1c looks great! His cholesterol is good. Kidney function is doing well on the Jardiance. Blood counts good. No further questions at this time.

## 2024-08-03 DIAGNOSIS — J41.0 SIMPLE CHRONIC BRONCHITIS (HCC): ICD-10-CM

## 2024-08-06 RX ORDER — FLUTICASONE PROPIONATE AND SALMETEROL XINAFOATE 115; 21 UG/1; UG/1
2 AEROSOL, METERED RESPIRATORY (INHALATION) 2 TIMES DAILY
Qty: 12 G | Refills: 3 | Status: SHIPPED | OUTPATIENT
Start: 2024-08-06

## 2024-08-06 NOTE — TELEPHONE ENCOUNTER
Medication Refill Request    Markell Lowery is requesting a refill of the following medication(s):   Requested Prescriptions     Pending Prescriptions Disp Refills    ADVAIR -21 MCG/ACT inhaler [Pharmacy Med Name: Advair -21 MCG/ACT Inhalation Aerosol] 12 g 0     Sig: Inhale 2 puffs by mouth twice daily        Listed PCP is Renee Peterson MD   Last provider to prescribe medication: Dr. Peterson on 3/11/24  Date of Last Office Visit at [facility]: 7/30/24 with Dr. Peterson    FUTURE APPOINTMENT: No future appointments.    Please send refill to:    [facility] [address] [place] [address] -  [phone] - F [phone]  [address] [place] [address]  Phone: [phone] Fax: [phone]      Please review request and approve or deny with recommendations within 48 hours.

## 2024-08-08 DIAGNOSIS — J41.0 SIMPLE CHRONIC BRONCHITIS (HCC): ICD-10-CM

## 2024-08-08 NOTE — TELEPHONE ENCOUNTER
Medication Refill Request    Markell Lowery is requesting a refill of the following medication(s):   Requested Prescriptions     Pending Prescriptions Disp Refills    tiotropium (SPIRIVA RESPIMAT) 2.5 MCG/ACT AERS inhaler 4 g 0     Sig: INHALE 2 PUFFS BY MOUTH ONCE DAILY        Listed PCP is Renee Peterson MD   Last provider to prescribe medication: Dr. Glorai on 6/27/24  Date of Last Office Visit at [facility]: 7/30/24 with Dr. Peterson    FUTURE APPOINTMENT: No future appointments.    Please send refill to:    [facility] [address] [place] [address] -  [phone] - F [phone]  [address] [place] [address]  Phone: [phone] Fax: [phone]      Please review request and approve or deny with recommendations within 48 hours.

## 2024-08-08 NOTE — TELEPHONE ENCOUNTER
Pt is requesting a refill of tiotropium (SPIRIVA RESPIMAT) 2.5 MCG/ACT AERS inhaler. He stated that the Pharmacist informed her that several faxes have been faxed as a request. Please send refill to:    [facility] [address] [place] [address] - P [phone] F [phone] [address] [facility] [address]  Phone: [phone] Fax: [phone]    Thank you

## 2024-08-14 ENCOUNTER — TELEPHONE (OUTPATIENT)
Age: 69
End: 2024-08-14

## 2024-08-14 NOTE — TELEPHONE ENCOUNTER
A voicemail was left for the patient to call back to confirm the appointment date and time set for a follow up.

## 2024-08-14 NOTE — TELEPHONE ENCOUNTER
----- Message from Emily MARTELL sent at 7/31/2024 11:38 AM EDT -----  Regarding: end of september  Diabetes follow up

## 2024-08-29 ENCOUNTER — HOSPITAL ENCOUNTER (OUTPATIENT)
Facility: HOSPITAL | Age: 69
Discharge: HOME | End: 2024-08-29
Attending: FAMILY MEDICINE
Payer: MEDICARE

## 2024-08-29 ENCOUNTER — HOSPITAL ENCOUNTER (OUTPATIENT)
Dept: VASCULAR SURGERY | Facility: HOSPITAL | Age: 69
End: 2024-08-29
Attending: FAMILY MEDICINE
Payer: MEDICARE

## 2024-08-29 ENCOUNTER — HOSPITAL ENCOUNTER (OUTPATIENT)
Dept: VASCULAR SURGERY | Facility: HOSPITAL | Age: 69
Discharge: HOME | End: 2024-08-31
Attending: FAMILY MEDICINE
Payer: MEDICARE

## 2024-08-29 DIAGNOSIS — Z87.891 PERSONAL HISTORY OF TOBACCO USE: ICD-10-CM

## 2024-08-29 DIAGNOSIS — L98.491 SKIN ULCER, LIMITED TO BREAKDOWN OF SKIN (HCC): ICD-10-CM

## 2024-08-29 PROCEDURE — 71271 CT THORAX LUNG CANCER SCR C-: CPT

## 2024-08-29 PROCEDURE — 93922 UPR/L XTREMITY ART 2 LEVELS: CPT

## 2024-08-30 LAB
VAS LEFT ABI: 1.12
VAS LEFT ARM BP: 121 MMHG
VAS LEFT DORSALIS PEDIS BP: 133 MMHG
VAS LEFT PTA BP: 135 MMHG
VAS LEFT TBI: 0.95
VAS LEFT TOE PRESSURE: 115 MMHG
VAS RIGHT ABI: 1.17
VAS RIGHT ARM BP: 121 MMHG
VAS RIGHT DORSALIS PEDIS BP: 142 MMHG
VAS RIGHT PTA BP: 139 MMHG
VAS RIGHT TBI: 1.54
VAS RIGHT TOE PRESSURE: 186 MMHG

## 2024-09-06 ENCOUNTER — TELEPHONE (OUTPATIENT)
Age: 69
End: 2024-09-06

## 2024-09-30 DIAGNOSIS — I10 PRIMARY HYPERTENSION: ICD-10-CM

## 2024-09-30 RX ORDER — EMPAGLIFLOZIN 25 MG/1
25 TABLET, FILM COATED ORAL DAILY
Qty: 90 TABLET | Refills: 3 | Status: SHIPPED | OUTPATIENT
Start: 2024-09-30

## 2024-09-30 NOTE — TELEPHONE ENCOUNTER
Medication Refill Request    Markell Lowery is requesting a refill of the following medication(s):   Requested Prescriptions     Pending Prescriptions Disp Refills    JARDIANCE 25 MG tablet [Pharmacy Med Name: Jardiance 25 MG Oral Tablet] 90 tablet 0     Sig: Take 1 tablet by mouth once daily        Listed PCP is Renee Peterson MD   Last provider to prescribe medication: Dr. Peterson   Last Date of Medication Prescribed: 07/10/2024   Date of Last Office Visit at [facility]: 07/300/2024  Last Labs:  Hemoglobin A1C   Date Value Ref Range Status   07/30/2024 7.8 (H) 4.0 - 5.6 % Final     Comment:     (NOTE)  HbA1C Interpretive Ranges  <5.7              Normal  5.7 - 6.4         Consider Prediabetes  >6.5              Consider Diabetes         Future Appointment:   Future Appointments   Date Time Provider Department Center   10/8/2024  9:00 AM Renee Peterson MD [facility] ECC DEP       Please send refill to:  [facility] [address] [place] [address] -  [phone] - F [phone]  [address] [place] [address]  Phone: [phone] Fax: [phone]

## 2024-09-30 NOTE — TELEPHONE ENCOUNTER
Medication Refill Request    Markell Lowery is requesting a refill of the following medication(s):   Requested Prescriptions     Pending Prescriptions Disp Refills    metFORMIN (GLUCOPHAGE) 1000 MG tablet 180 tablet 3     Sig: Take 1 tablet by mouth 2 times daily (with meals)        Listed PCP is Renee Peterson MD   Last provider to prescribe medication: Dr. Gloria  Last Date of Medication Prescribed: Metformin - 06/27/2024   Date of Last Office Visit at [facility]: 07/30/2024   Last Labs:  Hemoglobin A1C   Date Value Ref Range Status   07/30/2024 7.8 (H) 4.0 - 5.6 % Final     Comment:     (NOTE)  HbA1C Interpretive Ranges  <5.7              Normal  5.7 - 6.4         Consider Prediabetes  >6.5              Consider Diabetes         Future Appointment:   Future Appointments   Date Time Provider Department Center   10/8/2024  9:00 AM Renee Peterson MD [facility] ECC DEP       Please send refill to:    [facility] [address] [place] [address] -  [phone] - F [phone]  [address] [place] [address]  Phone: [phone] Fax: [phone]

## 2024-10-09 RX ORDER — PEN NEEDLE, DIABETIC 32 GX 1/4"
NEEDLE, DISPOSABLE MISCELLANEOUS
Qty: 100 EACH | Refills: 4 | Status: SHIPPED | OUTPATIENT
Start: 2024-10-09

## 2024-10-09 NOTE — TELEPHONE ENCOUNTER
Medication Refill Request    Markell Lowery is requesting a refill of the following medication(s):   Requested Prescriptions     Pending Prescriptions Disp Refills    BD PEN NEEDLE MICRO U/F 32G X 6 MM MISC [Pharmacy Med Name: BD [id number] MIS] 100 each 0     Sig: USE TO INJECT INSULIN AS DIRECTED        Listed PCP is Renee Peterson MD   Last provider to prescribe medication: Dr. Peterson   Last Date of Medication Prescribed: 10/03/2023   Date of Last Office Visit at [facility]: 1956     Future Appointment: No future appointments.    Please send refill to:    [facility] [address] [place] [address] -  [phone] - F [phone]  [address] [place] [address]  Phone: [phone] Fax: [phone]

## 2024-11-15 DIAGNOSIS — I10 PRIMARY HYPERTENSION: ICD-10-CM

## 2024-11-15 RX ORDER — LISINOPRIL AND HYDROCHLOROTHIAZIDE 12.5; 2 MG/1; MG/1
1 TABLET ORAL DAILY
Qty: 100 TABLET | Refills: 3 | Status: SHIPPED | OUTPATIENT
Start: 2024-11-15

## 2024-11-15 NOTE — TELEPHONE ENCOUNTER
MD Markell Mattson Beverley has been flagged for Adherence by [facility].   Patient's Insurance will allow a 100 day supply     Lisinopril and hydrochlorothiazide 20/12.5 MG last filled on 24 for  qty 90 / 90 day supply. Next refill due: 24   0 refills remain    Prescriber: Renee Peterson MD   Pharmacy:[facility] [address] [place] [address] [phone] [facility] [phone]    I have pended refills for your approval and changed to a 100 day supply. Please let me know if there is anything I can help with.    LOV:24  NOV:none     Thank you,  Keyla Harrison [facility]  Population Health    [facility]   [phone] option 1     Requested Prescriptions     Pending Prescriptions Disp Refills    lisinopril-hydroCHLOROthiazide (PRINZIDE;ZESTORETIC) 20-12.5 MG per tablet 100 tablet 3     Sig: Take 1 tablet by mouth daily        ================================================================================    POPULATION HEALTH CLINICAL PHARMACY: ADHERENCE REVIEW  Identified care gap per [employer] fills with [facility]: ACE/ARB adherence    [facility]  Per insurer report, LIS-0 - co-pays are based on tiers and patient is subject to coverage gap.    ASSESSMENT    ACE/ARB ADHERENCE    Insurance Records claims through  24  (Prior Year PDC = not reported; YTD PDC = 81%; Potential Fail Date: 24):   LISINOP/HCTZ TAB 20-12.5 MG last filled on 24 for 90 day supply. Next refill due: 24    Prescribed si tablet/capsule daily    Per Reconcile Dispense History and Insurer Portal: last filled on 24 for 90 day supply.     Per [facility]: last picked up on 08.16.24 for 90 day supply. Billed through [facility]. 0 refills remaining.    BP Readings from Last 3 Encounters:   24 119/85   23 122/75   23 121/73     Estimated Creatinine Clearance: 78 mL/min (based on SCr of

## 2024-11-22 ENCOUNTER — TELEPHONE (OUTPATIENT)
Age: 69
End: 2024-11-22

## 2024-11-22 NOTE — TELEPHONE ENCOUNTER
Rere from [facility] called to inform you that Advair will no longer be covered by [facility]. The alternatives that are covered will be Symbicort and Wixela Diskus. She is also asking for a returned phone call to discuss if a statin would be appropriate due to pt's dx of diabetes. She asked that you return her phone call to [phone], ext [phone].    Thank you

## 2024-11-26 ENCOUNTER — TELEPHONE (OUTPATIENT)
Age: 69
End: 2024-11-26

## 2024-11-26 NOTE — TELEPHONE ENCOUNTER
- I spoke with the   at [facility] and was able to get the patient scheduled for Monday 12.02.24 @ 10:30am.          .     I called patient and provided this information and also sent out a letter to the home address on file.      Patient stated he did test couple months ago and wanted a call back regarding results. He stated he had a CT of the lung and also vascular ankle brachial index. He said the hospital sent a letter to his home that nothing was abnormal for his vascular exam and or his CT on 8/29/24 it resulted back as normal.     Also I rescheduled his missed appointment with you for time and date shown below:    Future Appointments   Date Time Provider Department Center   12/27/2024  1:40 PM Renee Peterson MD [facility] DEP         No further action needed.

## 2024-12-05 DIAGNOSIS — J41.0 SIMPLE CHRONIC BRONCHITIS (HCC): ICD-10-CM

## 2024-12-06 NOTE — TELEPHONE ENCOUNTER
Medication Refill Request    Markell Lowery is requesting a refill of the following medication(s):   Requested Prescriptions     Pending Prescriptions Disp Refills    tiotropium (SPIRIVA RESPIMAT) 2.5 MCG/ACT AERS inhaler [Pharmacy Med Name: Spiriva Respimat 2.5 MCG/ACT Inhalation Aerosol Solution] 4 g 0     Sig: INHALE 2 SPRAY(S) BY MOUTH ONCE DAILY    ADVAIR -21 MCG/ACT inhaler [Pharmacy Med Name: Advair -21 MCG/ACT Inhalation Aerosol] 12 g 0     Sig: Inhale 2 puffs by mouth twice daily        Listed PCP is Renee Peterson MD   Last provider to prescribe medication: Renee Peterson MD   Last Date of Medication Prescribed: 08.06.24 - 08.08.24   Date of Last Office Visit at [facility]:  07.30.24  FUTURE APPOINTMENT:   Future Appointments   Date Time Provider Department Center   12/27/2024  1:40 PM Renee Peterson MD [facility] DEP       Please send refill to:    [facility] [address] [place] [address] -  [phone] - F [phone]  [address] [place] [address]  Phone: [phone] Fax: [phone]      Please review request and approve or deny with recommendations.

## 2024-12-09 RX ORDER — FLUTICASONE PROPIONATE AND SALMETEROL XINAFOATE 115; 21 UG/1; UG/1
2 AEROSOL, METERED RESPIRATORY (INHALATION) 2 TIMES DAILY
Qty: 12 G | Refills: 3 | Status: SHIPPED | OUTPATIENT
Start: 2024-12-09

## 2025-02-05 SDOH — ECONOMIC STABILITY: FOOD INSECURITY: WITHIN THE PAST 12 MONTHS, THE FOOD YOU BOUGHT JUST DIDN'T LAST AND YOU DIDN'T HAVE MONEY TO GET MORE.: PATIENT DECLINED

## 2025-02-05 SDOH — ECONOMIC STABILITY: TRANSPORTATION INSECURITY
IN THE PAST 12 MONTHS, HAS THE LACK OF TRANSPORTATION KEPT YOU FROM MEDICAL APPOINTMENTS OR FROM GETTING MEDICATIONS?: PATIENT DECLINED

## 2025-02-05 SDOH — ECONOMIC STABILITY: INCOME INSECURITY: IN THE LAST 12 MONTHS, WAS THERE A TIME WHEN YOU WERE NOT ABLE TO PAY THE MORTGAGE OR RENT ON TIME?: PATIENT DECLINED

## 2025-02-05 SDOH — ECONOMIC STABILITY: FOOD INSECURITY: WITHIN THE PAST 12 MONTHS, YOU WORRIED THAT YOUR FOOD WOULD RUN OUT BEFORE YOU GOT MONEY TO BUY MORE.: PATIENT DECLINED

## 2025-02-05 SDOH — ECONOMIC STABILITY: TRANSPORTATION INSECURITY
IN THE PAST 12 MONTHS, HAS LACK OF TRANSPORTATION KEPT YOU FROM MEETINGS, WORK, OR FROM GETTING THINGS NEEDED FOR DAILY LIVING?: PATIENT DECLINED

## 2025-02-06 ENCOUNTER — OFFICE VISIT (OUTPATIENT)
Age: 70
End: 2025-02-06
Payer: MEDICARE

## 2025-02-06 VITALS
RESPIRATION RATE: 18 BRPM | HEIGHT: 76 IN | HEART RATE: 96 BPM | SYSTOLIC BLOOD PRESSURE: 114 MMHG | BODY MASS INDEX: 26.06 KG/M2 | DIASTOLIC BLOOD PRESSURE: 72 MMHG | OXYGEN SATURATION: 97 % | TEMPERATURE: 98.4 F | WEIGHT: 214 LBS

## 2025-02-06 DIAGNOSIS — N18.31 CHRONIC KIDNEY DISEASE, STAGE 3A (HCC): ICD-10-CM

## 2025-02-06 DIAGNOSIS — I48.0 PAROXYSMAL ATRIAL FIBRILLATION (HCC): ICD-10-CM

## 2025-02-06 DIAGNOSIS — Z00.00 MEDICARE ANNUAL WELLNESS VISIT, SUBSEQUENT: Primary | ICD-10-CM

## 2025-02-06 DIAGNOSIS — J41.0 SIMPLE CHRONIC BRONCHITIS (HCC): ICD-10-CM

## 2025-02-06 DIAGNOSIS — E11.40 TYPE 2 DIABETES MELLITUS WITH DIABETIC NEUROPATHY, WITH LONG-TERM CURRENT USE OF INSULIN (HCC): ICD-10-CM

## 2025-02-06 DIAGNOSIS — Z79.4 TYPE 2 DIABETES MELLITUS WITH DIABETIC NEUROPATHY, WITH LONG-TERM CURRENT USE OF INSULIN (HCC): ICD-10-CM

## 2025-02-06 PROCEDURE — 3074F SYST BP LT 130 MM HG: CPT | Performed by: FAMILY MEDICINE

## 2025-02-06 PROCEDURE — 3046F HEMOGLOBIN A1C LEVEL >9.0%: CPT | Performed by: FAMILY MEDICINE

## 2025-02-06 PROCEDURE — G0439 PPPS, SUBSEQ VISIT: HCPCS | Performed by: FAMILY MEDICINE

## 2025-02-06 PROCEDURE — 3017F COLORECTAL CA SCREEN DOC REV: CPT | Performed by: FAMILY MEDICINE

## 2025-02-06 PROCEDURE — 1159F MED LIST DOCD IN RCRD: CPT | Performed by: FAMILY MEDICINE

## 2025-02-06 PROCEDURE — 1126F AMNT PAIN NOTED NONE PRSNT: CPT | Performed by: FAMILY MEDICINE

## 2025-02-06 PROCEDURE — 1123F ACP DISCUSS/DSCN MKR DOCD: CPT | Performed by: FAMILY MEDICINE

## 2025-02-06 PROCEDURE — 3078F DIAST BP <80 MM HG: CPT | Performed by: FAMILY MEDICINE

## 2025-02-06 ASSESSMENT — PATIENT HEALTH QUESTIONNAIRE - PHQ9
2. FEELING DOWN, DEPRESSED OR HOPELESS: MORE THAN HALF THE DAYS
SUM OF ALL RESPONSES TO PHQ QUESTIONS 1-9: 12
1. LITTLE INTEREST OR PLEASURE IN DOING THINGS: MORE THAN HALF THE DAYS
9. THOUGHTS THAT YOU WOULD BE BETTER OFF DEAD, OR OF HURTING YOURSELF: NOT AT ALL
SUM OF ALL RESPONSES TO PHQ QUESTIONS 1-9: 12
7. TROUBLE CONCENTRATING ON THINGS, SUCH AS READING THE NEWSPAPER OR WATCHING TELEVISION: MORE THAN HALF THE DAYS
4. FEELING TIRED OR HAVING LITTLE ENERGY: MORE THAN HALF THE DAYS
SUM OF ALL RESPONSES TO PHQ9 QUESTIONS 1 & 2: 4
5. POOR APPETITE OR OVEREATING: NOT AT ALL
6. FEELING BAD ABOUT YOURSELF - OR THAT YOU ARE A FAILURE OR HAVE LET YOURSELF OR YOUR FAMILY DOWN: SEVERAL DAYS
SUM OF ALL RESPONSES TO PHQ QUESTIONS 1-9: 12
8. MOVING OR SPEAKING SO SLOWLY THAT OTHER PEOPLE COULD HAVE NOTICED. OR THE OPPOSITE, BEING SO FIGETY OR RESTLESS THAT YOU HAVE BEEN MOVING AROUND A LOT MORE THAN USUAL: SEVERAL DAYS
3. TROUBLE FALLING OR STAYING ASLEEP: MORE THAN HALF THE DAYS
SUM OF ALL RESPONSES TO PHQ QUESTIONS 1-9: 12

## 2025-02-06 ASSESSMENT — LIFESTYLE VARIABLES
HOW MANY STANDARD DRINKS CONTAINING ALCOHOL DO YOU HAVE ON A TYPICAL DAY: PATIENT DOES NOT DRINK
HOW OFTEN DO YOU HAVE A DRINK CONTAINING ALCOHOL: NEVER

## 2025-02-06 NOTE — PROGRESS NOTES
Medicare Annual Wellness Visit    Markell Lowery is here for Medicare AWV (Patient is coming in for a medicare wellness exam and diabetes follow up. Insurance call him stating that they weill not cover Advair. No other concerns. )    Assessment & Plan   Medicare annual wellness visit, subsequent  Type 2 diabetes mellitus with diabetic neuropathy, with long-term current use of insulin (HCC)  Comments:  Difficult to control with A1c > 8%. Reasonable goal is < 8%. Cont Jardiance and Lantus 40.  Noticing his weight is improving on the Jardiance.  Orders:  -     Basic Metabolic Panel; Future  -     Hemoglobin A1C; Future  -     Albumin/Creatinine Ratio, Urine; Future  Simple chronic bronchitis (HCC)  Comments:  Stable on Advair  Paroxysmal atrial fibrillation (HCC)  Comments:  Poor cardiology follow up. Declined recommended blood thinner. No recurrence of symptoms.  Chronic kidney disease, stage 3a (HCC)  Comments:  Check BMP and urine microalbumin.       Return in about 6 months (around 8/6/2025) for Diabetes.     Subjective   The following acute and/or chronic problems were also addressed today:  - Father recently passed last month. He was 94 and had been in and out of the hospital.     - Stress since father passing. Sorting out his affairs.     - Home BG is not spiking as much. The BG this AM was 66.     Patient's complete Health Risk Assessment and screening values have been reviewed and are found in Flowsheets. The following problems were reviewed today and where indicated follow up appointments were made and/or referrals ordered.    Positive Risk Factor Screenings with Interventions:          Controlled Medication Review:    Today's Pain Level: Pain Score: Zero     Opioid Risk: (Low risk score <55) Opioid risk score: 28    Patient is low risk for opioid use disorder or overdose.    Last PDMP Markell as Reviewed:  Review User Review Instant Review Result                          Tobacco Use:    Tobacco Use

## 2025-02-06 NOTE — PROGRESS NOTES
Markell Lowery is a 69 y.o. male      Chief Complaint   Patient presents with    Medicare AWV     Patient is coming in for a medicare wellness exam and diabetes follow up. Insurance call him stating that they weill not cover Advair. No other concerns.        \"Have you been to the ER, urgent care clinic since your last visit?  Hospitalized since your last visit?\"    NO    “Have you seen or consulted any other health care providers outside of [facility] since your last visit?”    NO            Vitals:    02/06/25 1334   BP: 114/72   Site: Right Upper Arm   Position: Sitting   Pulse: 96   Resp: 18   Temp: 98.4 °F (36.9 °C)   TempSrc: Oral   SpO2: 97%   Weight: 97.1 kg (214 lb)   Height: 1.93 m (6' 4\")            Health Maintenance Due   Topic Date Due    Diabetic retinal exam  Never done    Depression Screen  05/11/2024    Flu vaccine (1) Never done    COVID-19 Vaccine (2 - 2024-25 season) 09/01/2024    A1C test (Diabetic or Prediabetic)  10/30/2024    Annual Wellness Visit (Medicare Advantage)  01/01/2025         Medication Reconciliation completed, changes noted.  Please  Update medication list.

## 2025-02-06 NOTE — PATIENT INSTRUCTIONS
attack. These may include:    Chest pain or pressure, or a strange feeling in the chest.     Sweating.     Shortness of breath.     Pain, pressure, or a strange feeling in the back, neck, jaw, or upper belly or in one or both shoulders or arms.     Lightheadedness or sudden weakness.     A fast or irregular heartbeat.   After you call 911, the  may tell you to chew 1 adult-strength or 2 to 4 low-dose aspirin. Wait for an ambulance. Do not try to drive yourself.  Watch closely for changes in your health, and be sure to contact your doctor if you have any problems.  Where can you learn more?  Go to https://www."[employer],".net/patientEd and enter F075 to learn more about \"A Healthy Heart: Care Instructions.\"  Current as of: July 31, 2024  Content Version: 14.3  © 2024 [employer].   Care instructions adapted under license by [employer]. If you have questions about a medical condition or this instruction, always ask your healthcare professional. [employer], [employer], disclaims any warranty or liability for your use of this information.    Personalized Preventive Plan for Markell Lowery - 2/6/2025  Medicare offers a range of preventive health benefits. Some of the tests and screenings are paid in full while other may be subject to a deductible, co-insurance, and/or copay.  Some of these benefits include a comprehensive review of your medical history including lifestyle, illnesses that may run in your family, and various assessments and screenings as appropriate.  After reviewing your medical record and screening and assessments performed today your provider may have ordered immunizations, labs, imaging, and/or referrals for you.  A list of these orders (if applicable) as well as your Preventive Care list are included within your After Visit Summary for your review.

## 2025-02-07 LAB
CREAT UR-MCNC: 36.3 MG/DL
MICROALBUMIN UR-MCNC: 12.1 MG/DL
MICROALBUMIN/CREAT UR-RTO: 333 MG/G (ref 0–30)

## 2025-02-08 LAB
ANION GAP SERPL CALC-SCNC: 9 MMOL/L (ref 2–12)
BUN SERPL-MCNC: 26 MG/DL (ref 6–20)
BUN/CREAT SERPL: 20 (ref 12–20)
CALCIUM SERPL-MCNC: 9.9 MG/DL (ref 8.5–10.1)
CHLORIDE SERPL-SCNC: 95 MMOL/L (ref 97–108)
CO2 SERPL-SCNC: 28 MMOL/L (ref 21–32)
CREAT SERPL-MCNC: 1.33 MG/DL (ref 0.7–1.3)
EST. AVERAGE GLUCOSE BLD GHB EST-MCNC: 180 MG/DL
GLUCOSE SERPL-MCNC: 310 MG/DL (ref 65–100)
HBA1C MFR BLD: 7.9 % (ref 4–5.6)
POTASSIUM SERPL-SCNC: 4.8 MMOL/L (ref 3.5–5.1)
SODIUM SERPL-SCNC: 132 MMOL/L (ref 136–145)

## 2025-02-17 ENCOUNTER — TELEPHONE (OUTPATIENT)
Age: 70
End: 2025-02-17

## 2025-02-17 NOTE — TELEPHONE ENCOUNTER
I attempted to reach patient at mobile number provided and was unsuccessful. Left VM to give clinic a call back at their earliest  convenience.

## 2025-02-17 NOTE — TELEPHONE ENCOUNTER
----- Message from Dr. Renee Peterson MD sent at 2/10/2025 10:09 AM EST -----  Blood sugars very elevated the day of the visit but A1c remains at our goal of < 8%. Kidney function is a little worse but not too far from goal.     Can you call and notify him of the results? Also, find out if he had taken his medication the day of the visit?

## 2025-02-21 ENCOUNTER — TELEPHONE (OUTPATIENT)
Age: 70
End: 2025-02-21

## 2025-02-21 NOTE — TELEPHONE ENCOUNTER
I spoke w/Juli ROGERS and she stated the patient has an inactive plan with them since December 2024.    Patient has been notified and she stated he will reach out to his insurance company and give us a call back with an update.     - no further action needed at current time         
75

## 2025-02-21 NOTE — TELEPHONE ENCOUNTER
I spoke with patient and relayed results from provider. He verbally consented to understanding.     He stated he checks his BG levels periodically and it has been running about 120 to just under 200. He has had some low blood sugars that has woke him up out of his sleep and he was shaky. They had  ranged from 60-47.    He stated he did not take his medication the day of the appointment.     He stated the insurance would not cover his Advair inhaler - will call insurance to get more information.

## 2025-02-24 ENCOUNTER — TELEPHONE (OUTPATIENT)
Age: 70
End: 2025-02-24

## 2025-02-24 NOTE — TELEPHONE ENCOUNTER
A rep from [employer] called in regards to needing additional questions answered regarding the PA for Advair HFA Inhaler. She stated that it is due to  on 3/7 and 10 in the morning. [facility] can be contacted at [phone] and the reference number is [id number].    Thanks!

## 2025-03-26 RX ORDER — BUDESONIDE AND FORMOTEROL FUMARATE DIHYDRATE 160; 4.5 UG/1; UG/1
2 AEROSOL RESPIRATORY (INHALATION) 2 TIMES DAILY
Qty: 10.2 G | Refills: 3 | Status: SHIPPED | OUTPATIENT
Start: 2025-03-26

## 2025-03-26 NOTE — TELEPHONE ENCOUNTER
Patient stated that he would like the inhaler sent to the [facility] in [place].

## 2025-03-26 NOTE — TELEPHONE ENCOUNTER
Spoke to Mitul ORR in regard to Advair -21 MCG/ACT inhaler to obtain an alternative as this medication was denied for approval by insurance.     This writer disconnected as service was not acceptable with noise in the background.     Spoke to Ginna she notified this writer that 4 medications are on the alternative. As we started with Symbicort 160-4.5 mcg it does not need a prior authorization and can be sent to pharmacy with no issues.     Will pend medication for provider and send to be refilled.

## 2025-03-26 NOTE — TELEPHONE ENCOUNTER
This writer attempted to contact patient to verify pharmacy that he will like his inhaler sent. No answer left voicemail to return call.

## 2025-05-07 DIAGNOSIS — J41.0 SIMPLE CHRONIC BRONCHITIS (HCC): ICD-10-CM

## 2025-05-07 NOTE — TELEPHONE ENCOUNTER
Medication Refill Request    Markell Lowery is requesting a refill of the following medication(s):   Requested Prescriptions     Pending Prescriptions Disp Refills    tiotropium (SPIRIVA RESPIMAT) 2.5 MCG/ACT AERS inhaler [Pharmacy Med Name: Spiriva Respimat 2.5 MCG/ACT Inhalation Aerosol Solution] 4 g 0     Sig: INHALE 2 SPRAY(S) BY MOUTH ONCE DAILY        Listed PCP is Renee Peterson MD   Last provider to prescribe medication: Renee Peterson MD  Last Date of Medication Prescribed: 12.09.24   Date of Last Office Visit at [facility]: 02.06.25   FUTURE APPOINTMENT: No future appointments.    Please send refill to:    [facility] [address] [place] [address] -  [phone] - F [phone]  [address] [place] [address]  Phone: [phone] Fax: [phone]      Please review request and approve or deny with recommendations.

## 2025-06-02 DIAGNOSIS — E11.40 TYPE 2 DIABETES MELLITUS WITH DIABETIC NEUROPATHY, WITH LONG-TERM CURRENT USE OF INSULIN (HCC): ICD-10-CM

## 2025-06-02 DIAGNOSIS — Z79.4 TYPE 2 DIABETES MELLITUS WITH DIABETIC NEUROPATHY, WITH LONG-TERM CURRENT USE OF INSULIN (HCC): ICD-10-CM

## 2025-06-03 RX ORDER — INSULIN GLARGINE 100 [IU]/ML
INJECTION, SOLUTION SUBCUTANEOUS
Qty: 30 ML | Refills: 5 | Status: SHIPPED | OUTPATIENT
Start: 2025-06-03

## 2025-06-03 NOTE — TELEPHONE ENCOUNTER
Medication Refill Request    Markell Lowery is requesting a refill of the following medication(s):   Requested Prescriptions     Pending Prescriptions Disp Refills    LANTUS SOLOSTAR 100 UNIT/ML injection pen [Pharmacy Med Name: Lantus SoloStar 100 UNIT/ML Subcutaneous Solution Pen-injector] 30 mL 0     Sig: INJECT 40 UNITS SUBCUTANEOUSLY NIGHTLY        Listed PCP is Renee Peterson MD   Last provider to prescribe medication: Renee Pteerson MD  Last Date of Medication Prescribed: 07.30.24   Date of Last Office Visit at [facility]: 02.06.25   FUTURE APPOINTMENT: No future appointments.    Please send refill to:    [facility] [address] [place] [address] -  [phone] - F [phone]  [address] [place] [address]  Phone: [phone] Fax: [phone]      Please review request and approve or deny with recommendations.

## 2025-07-29 ENCOUNTER — COMMUNITY OUTREACH (OUTPATIENT)
Age: 70
End: 2025-07-29

## 2025-07-30 ENCOUNTER — OFFICE VISIT (OUTPATIENT)
Age: 70
End: 2025-07-30
Payer: MEDICARE

## 2025-07-30 VITALS
TEMPERATURE: 98.3 F | WEIGHT: 213.4 LBS | HEIGHT: 76 IN | RESPIRATION RATE: 18 BRPM | BODY MASS INDEX: 25.99 KG/M2 | OXYGEN SATURATION: 95 % | DIASTOLIC BLOOD PRESSURE: 67 MMHG | SYSTOLIC BLOOD PRESSURE: 108 MMHG | HEART RATE: 82 BPM

## 2025-07-30 DIAGNOSIS — Z79.4 TYPE 2 DIABETES MELLITUS WITH DIABETIC NEUROPATHY, WITH LONG-TERM CURRENT USE OF INSULIN (HCC): Primary | ICD-10-CM

## 2025-07-30 DIAGNOSIS — E11.40 TYPE 2 DIABETES MELLITUS WITH DIABETIC NEUROPATHY, WITH LONG-TERM CURRENT USE OF INSULIN (HCC): Primary | ICD-10-CM

## 2025-07-30 DIAGNOSIS — L97.512 ULCER OF RIGHT FOOT WITH FAT LAYER EXPOSED (HCC): ICD-10-CM

## 2025-07-30 DIAGNOSIS — N18.31 CHRONIC KIDNEY DISEASE, STAGE 3A (HCC): ICD-10-CM

## 2025-07-30 DIAGNOSIS — I10 PRIMARY HYPERTENSION: ICD-10-CM

## 2025-07-30 PROBLEM — E11.9 TYPE 2 DIABETES MELLITUS, WITH LONG-TERM CURRENT USE OF INSULIN (HCC): Status: RESOLVED | Noted: 2018-04-18 | Resolved: 2025-07-30

## 2025-07-30 PROCEDURE — 3074F SYST BP LT 130 MM HG: CPT | Performed by: FAMILY MEDICINE

## 2025-07-30 PROCEDURE — 99214 OFFICE O/P EST MOD 30 MIN: CPT | Performed by: FAMILY MEDICINE

## 2025-07-30 PROCEDURE — 4004F PT TOBACCO SCREEN RCVD TLK: CPT | Performed by: FAMILY MEDICINE

## 2025-07-30 PROCEDURE — 3017F COLORECTAL CA SCREEN DOC REV: CPT | Performed by: FAMILY MEDICINE

## 2025-07-30 PROCEDURE — 1123F ACP DISCUSS/DSCN MKR DOCD: CPT | Performed by: FAMILY MEDICINE

## 2025-07-30 PROCEDURE — 3078F DIAST BP <80 MM HG: CPT | Performed by: FAMILY MEDICINE

## 2025-07-30 PROCEDURE — G8427 DOCREV CUR MEDS BY ELIG CLIN: HCPCS | Performed by: FAMILY MEDICINE

## 2025-07-30 PROCEDURE — 1159F MED LIST DOCD IN RCRD: CPT | Performed by: FAMILY MEDICINE

## 2025-07-30 PROCEDURE — 2022F DILAT RTA XM EVC RTNOPTHY: CPT | Performed by: FAMILY MEDICINE

## 2025-07-30 PROCEDURE — 3051F HG A1C>EQUAL 7.0%<8.0%: CPT | Performed by: FAMILY MEDICINE

## 2025-07-30 PROCEDURE — G8419 CALC BMI OUT NRM PARAM NOF/U: HCPCS | Performed by: FAMILY MEDICINE

## 2025-07-30 ASSESSMENT — ENCOUNTER SYMPTOMS
SHORTNESS OF BREATH: 0
WHEEZING: 0

## 2025-07-30 NOTE — PROGRESS NOTES
Identified pt with two pt identifiers(name and ). Reviewed record in preparation for visit and have obtained necessary documentation.  Chief Complaint   Patient presents with    Follow-up     Pt c/o  RT leg pain and bilateral foot pain.    Vitals:    25 1115   BP: 108/67   BP Site: Right Upper Arm   Patient Position: Sitting   BP Cuff Size: Medium Adult   Pulse: 82   Resp: 18   Temp: 98.3 °F (36.8 °C)   TempSrc: Temporal   SpO2: 95%   Weight: 96.8 kg (213 lb 6.4 oz)   Height: 1.93 m (6' 4\")         Coordination of Care Questionnaire:  :     \"Have you been to the ER, urgent care clinic since your last visit?  Hospitalized since your last visit?\"    NO    “Have you seen or consulted any other health care providers outside of [facility] since your last visit?”    NO          Click Here for Release of Records Request
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
## 2025-07-30 NOTE — PATIENT INSTRUCTIONS
[facility]  [phone] [address] [place] [address]

## 2025-07-31 ENCOUNTER — RESULTS FOLLOW-UP (OUTPATIENT)
Age: 70
End: 2025-07-31

## 2025-07-31 LAB
ANION GAP SERPL CALC-SCNC: 12 MMOL/L (ref 2–14)
BUN SERPL-MCNC: 20 MG/DL (ref 8–23)
BUN/CREAT SERPL: 18 (ref 12–20)
CALCIUM SERPL-MCNC: 9.7 MG/DL (ref 8.8–10.2)
CHLORIDE SERPL-SCNC: 95 MMOL/L (ref 98–107)
CHOLEST SERPL-MCNC: 166 MG/DL (ref 0–200)
CO2 SERPL-SCNC: 27 MMOL/L (ref 20–29)
CREAT SERPL-MCNC: 1.13 MG/DL (ref 0.7–1.2)
CREAT UR-MCNC: 48.1 MG/DL (ref 39–259)
CRP SERPL-MCNC: <0.3 MG/DL (ref 0–0.5)
ERYTHROCYTE [DISTWIDTH] IN BLOOD BY AUTOMATED COUNT: 12.1 % (ref 11.5–14.5)
ERYTHROCYTE [SEDIMENTATION RATE] IN BLOOD: 2 MM/HR (ref 0–20)
EST. AVERAGE GLUCOSE BLD GHB EST-MCNC: 198 MG/DL
GLUCOSE SERPL-MCNC: 194 MG/DL (ref 65–100)
HBA1C MFR BLD: 8.5 % (ref 4–5.6)
HCT VFR BLD AUTO: 49 % (ref 36.6–50.3)
HDLC SERPL-MCNC: 61 MG/DL (ref 40–60)
HDLC SERPL: 2.7
HGB BLD-MCNC: 15.6 G/DL (ref 12.1–17)
LDLC SERPL CALC-MCNC: 85 MG/DL
MCH RBC QN AUTO: 29.2 PG (ref 26–34)
MCHC RBC AUTO-ENTMCNC: 31.8 G/DL (ref 30–36.5)
MCV RBC AUTO: 91.8 FL (ref 80–99)
MICROALBUMIN UR-MCNC: 7.4 MG/DL
MICROALBUMIN/CREAT UR-RTO: 154 MG/G
NRBC # BLD: 0 K/UL (ref 0–0.01)
NRBC BLD-RTO: 0 PER 100 WBC
PLATELET # BLD AUTO: 203 K/UL (ref 150–400)
PMV BLD AUTO: 11.1 FL (ref 8.9–12.9)
POTASSIUM SERPL-SCNC: 4.7 MMOL/L (ref 3.5–5.1)
RBC # BLD AUTO: 5.34 M/UL (ref 4.1–5.7)
SODIUM SERPL-SCNC: 135 MMOL/L (ref 136–145)
TRIGL SERPL-MCNC: 104 MG/DL (ref 0–150)
VLDLC SERPL CALC-MCNC: 21 MG/DL
WBC # BLD AUTO: 8.9 K/UL (ref 4.1–11.1)

## 2025-08-01 NOTE — TELEPHONE ENCOUNTER
----- Message from Dr. Renee Peterson MD sent at 7/31/2025  8:16 PM EDT -----  Renal fx improved from last check since being on SGLT2  A1c above goal of 8%  LDL above goal but pt declines statin    Please call and notify pt that his kidney function looks good, much improved from before. But his A1c is up at 8.5%. I am considering putting him back on a low dose of Glipizide 5mg but only during the day. If he is OK with that, I will send it in.